# Patient Record
Sex: FEMALE | Race: WHITE | NOT HISPANIC OR LATINO | Employment: OTHER | URBAN - METROPOLITAN AREA
[De-identification: names, ages, dates, MRNs, and addresses within clinical notes are randomized per-mention and may not be internally consistent; named-entity substitution may affect disease eponyms.]

---

## 2017-03-21 ENCOUNTER — ALLSCRIPTS OFFICE VISIT (OUTPATIENT)
Dept: OTHER | Facility: OTHER | Age: 72
End: 2017-03-21

## 2017-08-21 ENCOUNTER — GENERIC CONVERSION - ENCOUNTER (OUTPATIENT)
Dept: OTHER | Facility: OTHER | Age: 72
End: 2017-08-21

## 2018-01-10 NOTE — PROGRESS NOTES
Chief Complaint  suture removal L hand, healing very well      Active Problems   1  Actinic keratosis (702 0) (L57 0)  2  Changing skin lesion (709 9) (L98 9)  3  Screening for skin condition (V82 0) (Z13 89)  4  Seborrheic keratosis (702 19) (L82 1)  5  Squamous cell cancer of skin of left hand (173 62) (C44 629)    Current Meds  1  Lisinopril 10 MG Oral Tablet; Therapy: (Recorded:67Gsh2524) to Recorded  2  MetFORMIN HCl TABS; Therapy: (Recorded:80Mql7907) to Recorded  3  VESIcare 5 MG Oral Tablet; Therapy: (Recorded:90Pmf2469) to Recorded    Allergies   1  Penicillins   2  Latex    Assessment   1  Squamous cell cancer of skin of left hand (173 62) (C44 629)    Plan  Squamous cell cancer of skin of left hand    · Follow-up as previously scheduled Evaluation and Treatment  Follow-up  Status:  Complete  Done: [id number]    Future Appointments    Date/Time Provider Specialty Site   09/14/2016 09:00 AM Photo Kaylie Sosa, Nurse Schedule  [facility]   02/02/2017 10:35 AM MATEO Chapa  Dermatology [facility]   07/24/2017 09:35 AM MATEO Chapa   Dermatology [facility]     Signatures   Electronically signed by : MATEO Russo ; Aug 14 2016  9:46AM EST                       (Author)

## 2018-05-02 ENCOUNTER — HOSPITAL ENCOUNTER (INPATIENT)
Facility: HOSPITAL | Age: 73
LOS: 4 days | Discharge: HOME | DRG: 690 | End: 2018-05-06
Attending: EMERGENCY MEDICINE | Admitting: INTERNAL MEDICINE
Payer: MEDICARE

## 2018-05-02 ENCOUNTER — APPOINTMENT (EMERGENCY)
Dept: CT IMAGING | Facility: HOSPITAL | Age: 73
DRG: 690 | End: 2018-05-02
Payer: MEDICARE

## 2018-05-02 DIAGNOSIS — E11.9 TYPE 2 DIABETES MELLITUS (HCC): ICD-10-CM

## 2018-05-02 DIAGNOSIS — N12 PYELONEPHRITIS: Primary | ICD-10-CM

## 2018-05-02 DIAGNOSIS — E86.0 DEHYDRATION: ICD-10-CM

## 2018-05-02 DIAGNOSIS — E87.1 HYPONATREMIA: ICD-10-CM

## 2018-05-02 PROBLEM — N17.9 ACUTE KIDNEY INJURY (HCC): Status: ACTIVE | Noted: 2018-05-02

## 2018-05-02 PROBLEM — N39.0 URINARY TRACT INFECTION: Status: ACTIVE | Noted: 2018-05-02

## 2018-05-02 PROBLEM — E80.6 HYPERBILIRUBINEMIA: Status: ACTIVE | Noted: 2018-05-02

## 2018-05-02 PROBLEM — E87.6 HYPOKALEMIA: Status: ACTIVE | Noted: 2018-05-02

## 2018-05-02 LAB
ALBUMIN SERPL BCP-MCNC: 2.7 G/DL (ref 3.5–5)
ALP SERPL-CCNC: 190 U/L (ref 46–116)
ALT SERPL W P-5'-P-CCNC: 57 U/L (ref 12–78)
ANION GAP SERPL CALCULATED.3IONS-SCNC: 11 MMOL/L (ref 4–13)
AST SERPL W P-5'-P-CCNC: 30 U/L (ref 5–45)
BACTERIA UR QL AUTO: ABNORMAL /HPF
BASOPHILS # BLD MANUAL: 0 THOUSAND/UL (ref 0–0.1)
BASOPHILS NFR MAR MANUAL: 0 % (ref 0–1)
BILIRUB SERPL-MCNC: 1.3 MG/DL (ref 0.2–1)
BILIRUB UR QL STRIP: ABNORMAL
BUN SERPL-MCNC: 14 MG/DL (ref 5–25)
CALCIUM SERPL-MCNC: 8.9 MG/DL (ref 8.3–10.1)
CHLORIDE SERPL-SCNC: 93 MMOL/L (ref 100–108)
CLARITY UR: ABNORMAL
CO2 SERPL-SCNC: 24 MMOL/L (ref 21–32)
COLOR UR: YELLOW
CREAT SERPL-MCNC: 1.65 MG/DL (ref 0.6–1.3)
EOSINOPHIL # BLD MANUAL: 0 THOUSAND/UL (ref 0–0.4)
EOSINOPHIL NFR BLD MANUAL: 0 % (ref 0–6)
ERYTHROCYTE [DISTWIDTH] IN BLOOD BY AUTOMATED COUNT: 13.4 % (ref 11.6–15.1)
GFR SERPL CREATININE-BSD FRML MDRD: 30 ML/MIN/1.73SQ M
GLUCOSE SERPL-MCNC: 201 MG/DL (ref 65–140)
GLUCOSE SERPL-MCNC: 316 MG/DL (ref 65–140)
GLUCOSE UR STRIP-MCNC: ABNORMAL MG/DL
HCT VFR BLD AUTO: 39.3 % (ref 34.8–46.1)
HGB BLD-MCNC: 13.3 G/DL (ref 11.5–15.4)
HGB UR QL STRIP.AUTO: ABNORMAL
KETONES UR STRIP-MCNC: ABNORMAL MG/DL
LACTATE SERPL-SCNC: 1.1 MMOL/L (ref 0.5–2)
LEUKOCYTE ESTERASE UR QL STRIP: ABNORMAL
LIPASE SERPL-CCNC: 167 U/L (ref 73–393)
LYMPHOCYTES # BLD AUTO: 0.73 THOUSAND/UL (ref 0.6–4.47)
LYMPHOCYTES # BLD AUTO: 7 % (ref 14–44)
MCH RBC QN AUTO: 30.6 PG (ref 26.8–34.3)
MCHC RBC AUTO-ENTMCNC: 33.8 G/DL (ref 31.4–37.4)
MCV RBC AUTO: 91 FL (ref 82–98)
MONOCYTES # BLD AUTO: 1.78 THOUSAND/UL (ref 0–1.22)
MONOCYTES NFR BLD: 17 % (ref 4–12)
NEUTROPHILS # BLD MANUAL: 7.96 THOUSAND/UL (ref 1.85–7.62)
NEUTS SEG NFR BLD AUTO: 76 % (ref 43–75)
NITRITE UR QL STRIP: NEGATIVE
NON-SQ EPI CELLS URNS QL MICRO: ABNORMAL /HPF
NRBC BLD AUTO-RTO: 0 /100 WBCS
PH UR STRIP.AUTO: 6 [PH] (ref 4.5–8)
PLATELET # BLD AUTO: 169 THOUSANDS/UL (ref 149–390)
PLATELET # BLD AUTO: 194 THOUSANDS/UL (ref 149–390)
PLATELET BLD QL SMEAR: ADEQUATE
PMV BLD AUTO: 10.2 FL (ref 8.9–12.7)
PMV BLD AUTO: 10.2 FL (ref 8.9–12.7)
POTASSIUM SERPL-SCNC: 3.2 MMOL/L (ref 3.5–5.3)
PROT SERPL-MCNC: 7.3 G/DL (ref 6.4–8.2)
PROT UR STRIP-MCNC: ABNORMAL MG/DL
RBC # BLD AUTO: 4.34 MILLION/UL (ref 3.81–5.12)
RBC #/AREA URNS AUTO: ABNORMAL /HPF
RBC MORPH BLD: NORMAL
SODIUM SERPL-SCNC: 128 MMOL/L (ref 136–145)
SP GR UR STRIP.AUTO: 1.02 (ref 1–1.03)
TOTAL CELLS COUNTED SPEC: 100
UROBILINOGEN UR QL STRIP.AUTO: 2 E.U./DL
WBC # BLD AUTO: 10.48 THOUSAND/UL (ref 4.31–10.16)
WBC #/AREA URNS AUTO: ABNORMAL /HPF

## 2018-05-02 PROCEDURE — 81001 URINALYSIS AUTO W/SCOPE: CPT | Performed by: EMERGENCY MEDICINE

## 2018-05-02 PROCEDURE — 80053 COMPREHEN METABOLIC PANEL: CPT | Performed by: EMERGENCY MEDICINE

## 2018-05-02 PROCEDURE — 96361 HYDRATE IV INFUSION ADD-ON: CPT

## 2018-05-02 PROCEDURE — 85049 AUTOMATED PLATELET COUNT: CPT | Performed by: INTERNAL MEDICINE

## 2018-05-02 PROCEDURE — 83690 ASSAY OF LIPASE: CPT | Performed by: EMERGENCY MEDICINE

## 2018-05-02 PROCEDURE — 83605 ASSAY OF LACTIC ACID: CPT | Performed by: EMERGENCY MEDICINE

## 2018-05-02 PROCEDURE — 87186 SC STD MICRODIL/AGAR DIL: CPT | Performed by: EMERGENCY MEDICINE

## 2018-05-02 PROCEDURE — 36415 COLL VENOUS BLD VENIPUNCTURE: CPT | Performed by: EMERGENCY MEDICINE

## 2018-05-02 PROCEDURE — 96375 TX/PRO/DX INJ NEW DRUG ADDON: CPT

## 2018-05-02 PROCEDURE — 74176 CT ABD & PELVIS W/O CONTRAST: CPT

## 2018-05-02 PROCEDURE — 87186 SC STD MICRODIL/AGAR DIL: CPT | Performed by: GENERAL PRACTICE

## 2018-05-02 PROCEDURE — 87077 CULTURE AEROBIC IDENTIFY: CPT | Performed by: GENERAL PRACTICE

## 2018-05-02 PROCEDURE — 99285 EMERGENCY DEPT VISIT HI MDM: CPT

## 2018-05-02 PROCEDURE — 85007 BL SMEAR W/DIFF WBC COUNT: CPT | Performed by: EMERGENCY MEDICINE

## 2018-05-02 PROCEDURE — 83036 HEMOGLOBIN GLYCOSYLATED A1C: CPT | Performed by: INTERNAL MEDICINE

## 2018-05-02 PROCEDURE — 87077 CULTURE AEROBIC IDENTIFY: CPT | Performed by: EMERGENCY MEDICINE

## 2018-05-02 PROCEDURE — 85027 COMPLETE CBC AUTOMATED: CPT | Performed by: EMERGENCY MEDICINE

## 2018-05-02 PROCEDURE — 82948 REAGENT STRIP/BLOOD GLUCOSE: CPT

## 2018-05-02 PROCEDURE — 87040 BLOOD CULTURE FOR BACTERIA: CPT | Performed by: EMERGENCY MEDICINE

## 2018-05-02 PROCEDURE — 96365 THER/PROPH/DIAG IV INF INIT: CPT

## 2018-05-02 PROCEDURE — 99223 1ST HOSP IP/OBS HIGH 75: CPT | Performed by: INTERNAL MEDICINE

## 2018-05-02 PROCEDURE — 87086 URINE CULTURE/COLONY COUNT: CPT | Performed by: GENERAL PRACTICE

## 2018-05-02 RX ORDER — POTASSIUM CHLORIDE 20 MEQ/1
40 TABLET, EXTENDED RELEASE ORAL ONCE
Status: COMPLETED | OUTPATIENT
Start: 2018-05-02 | End: 2018-05-02

## 2018-05-02 RX ORDER — ONDANSETRON 2 MG/ML
4 INJECTION INTRAMUSCULAR; INTRAVENOUS EVERY 6 HOURS PRN
Status: DISCONTINUED | OUTPATIENT
Start: 2018-05-02 | End: 2018-05-06 | Stop reason: HOSPADM

## 2018-05-02 RX ORDER — MORPHINE SULFATE 2 MG/ML
1 INJECTION, SOLUTION INTRAMUSCULAR; INTRAVENOUS EVERY 6 HOURS PRN
Status: DISCONTINUED | OUTPATIENT
Start: 2018-05-02 | End: 2018-05-06 | Stop reason: HOSPADM

## 2018-05-02 RX ORDER — KETOROLAC TROMETHAMINE 30 MG/ML
15 INJECTION, SOLUTION INTRAMUSCULAR; INTRAVENOUS ONCE
Status: COMPLETED | OUTPATIENT
Start: 2018-05-02 | End: 2018-05-02

## 2018-05-02 RX ORDER — ACETAMINOPHEN 325 MG/1
650 TABLET ORAL EVERY 6 HOURS PRN
Status: DISCONTINUED | OUTPATIENT
Start: 2018-05-02 | End: 2018-05-06 | Stop reason: HOSPADM

## 2018-05-02 RX ORDER — SODIUM CHLORIDE 9 MG/ML
75 INJECTION, SOLUTION INTRAVENOUS CONTINUOUS
Status: DISCONTINUED | OUTPATIENT
Start: 2018-05-02 | End: 2018-05-04

## 2018-05-02 RX ORDER — ONDANSETRON 2 MG/ML
4 INJECTION INTRAMUSCULAR; INTRAVENOUS ONCE
Status: COMPLETED | OUTPATIENT
Start: 2018-05-02 | End: 2018-05-02

## 2018-05-02 RX ORDER — HEPARIN SODIUM 5000 [USP'U]/ML
5000 INJECTION, SOLUTION INTRAVENOUS; SUBCUTANEOUS EVERY 8 HOURS SCHEDULED
Status: DISCONTINUED | OUTPATIENT
Start: 2018-05-02 | End: 2018-05-06 | Stop reason: HOSPADM

## 2018-05-02 RX ADMIN — KETOROLAC TROMETHAMINE 15 MG: 30 INJECTION, SOLUTION INTRAMUSCULAR at 14:41

## 2018-05-02 RX ADMIN — SODIUM CHLORIDE 1000 ML: 0.9 INJECTION, SOLUTION INTRAVENOUS at 17:04

## 2018-05-02 RX ADMIN — CEFEPIME HYDROCHLORIDE 2000 MG: 2 INJECTION, SOLUTION INTRAVENOUS at 17:04

## 2018-05-02 RX ADMIN — SODIUM CHLORIDE 1000 ML: 0.9 INJECTION, SOLUTION INTRAVENOUS at 14:42

## 2018-05-02 RX ADMIN — INSULIN LISPRO 1 UNITS: 100 INJECTION, SOLUTION INTRAVENOUS; SUBCUTANEOUS at 22:38

## 2018-05-02 RX ADMIN — POTASSIUM CHLORIDE 40 MEQ: 1500 TABLET, EXTENDED RELEASE ORAL at 19:29

## 2018-05-02 RX ADMIN — ONDANSETRON 4 MG: 2 INJECTION INTRAMUSCULAR; INTRAVENOUS at 14:41

## 2018-05-02 RX ADMIN — HEPARIN SODIUM 5000 UNITS: 5000 INJECTION, SOLUTION INTRAVENOUS; SUBCUTANEOUS at 21:36

## 2018-05-02 RX ADMIN — SODIUM CHLORIDE 75 ML/HR: 0.9 INJECTION, SOLUTION INTRAVENOUS at 19:29

## 2018-05-02 RX ADMIN — ACETAMINOPHEN 650 MG: 325 TABLET, FILM COATED ORAL at 22:12

## 2018-05-02 NOTE — H&P
H&P- Tina Hurtado 1945, 74 y o  female MRN: [id number]    Unit/Bed#: ED 08 Encounter: [id number]    Primary Care Provider: Dhiraj Lutz DO   Date and time admitted to hospital: 5/2/2018  1:58 PM        Urinary tract infection   Assessment & Plan    Urinalysis on admission showed positive leukocytes, bacteria  Patient also has bilateral flank pain, left side greater than right  Likely possible pyelonephritis, with complaint of hydronephrosis on left side  CT scan of the abdomen showed mild dilation left pelvicalyceal system with mildly dilated left ureter with perinephric stranding with no demonstratable obstructing calculus  Patient was empirically started on IV cefepime  Continue IV cefepime for now for possible pyelonephritis  Continue monitor CBC, temps  Monitor final urine cultures        Hyponatremia   Assessment & Plan    Patient's sodium level on admission was 128 but Corrected sodium for hyperglycemia-132 likely secondary to dehydration  She received 2 L IV normal saline bolus while in the ER    Continue IV fluid hydration with normal saline  Monitor sodium          Acute kidney injury ([address] )   Assessment & Plan    Patient's creatinine on admission is 1 65  Baseline creatinine unknown  She received 2 L normal saline bolus while in the ER  Will continue maintenance fluid hydration  Monitor creatinine  Avoid NSAID's (pt did receive toradal in ED)        Type 2 diabetes mellitus [facility])   Assessment & Plan    Patient did report that she has prior history of diabetes but has not been taking any of her medications  Blood sugars have been elevated, ranging in 300's    Check A1c  Insulin sliding scale, monitor sugars  Counseled the patient importance of tight blood sugar control  Close outpatient follow-up after discharge        Hypokalemia   Assessment & Plan    Potassium was 2 8 on admission    Likely secondary to nausea/vomiting/dehydration    Replete and monitor Hyperbilirubinemia   Assessment & Plan    Likely secondary to acute infection/dehydration    Continue to monitor              VTE Prophylaxis: Heparin  / sequential compression device   Code Status:  Full code  POLST: There is no POLST form on file for this patient (pre-hospital)  Discussion with family:  Discussed with patient in detail about her management    Anticipated Length of Stay:  Patient will be admitted on an Inpatient basis with an anticipated length of stay of  greater than 2 midnights  Justification for Hospital Stay:  Possible left-sided pyelonephritis/hydronephrosis    Total Time for Visit, including Counseling / Coordination of Care: 30 minutes  Greater than 50% of this total time spent on direct patient counseling and coordination of care  Chief Complaint:   Flank pain    History of Present Illness:    Eric Hernandez is a 74 y o  female with past medical history of diabetes, medication noncompliance presented to ED complaints of bilateral flank pain, fevers, chills, nausea, vomiting for the past 1 week  She did go to urgent care today but as she was unable to provide a urine for testing she was sent to ER for further evaluation  Urinalysis on admission was positive for leukocytes, bacteria, blood, empirically started on IV cefepime for possible UTI  CT of the abdomen showed dilation of left pelvicalyceal system, with mildly dilated left ureter with perinephric stranding with no demonstrable obstructing calculus  She was also found to have sodium of 128, potassium 3 2, creatinine 1 65 and glucose of 313 on BMP  Currently patient does admits her pain got better with the pain medication but still has some tenderness over the left flank area  Review of Systems:    Review of Systems   Constitutional: Positive for activity change, appetite change, chills, fatigue and fever  Respiratory: Positive for cough  Negative for shortness of breath      Cardiovascular: Negative for chest pain, palpitations and leg swelling  Gastrointestinal: Positive for abdominal pain, nausea and vomiting  Negative for diarrhea  Genitourinary: Positive for decreased urine volume, difficulty urinating and dysuria  Musculoskeletal: Positive for back pain  Neurological: Negative for dizziness and light-headedness  Past Medical and Surgical History:     Past Medical History:   Diagnosis Date    Diabetes mellitus ([facility] [address] )     Patient states "I quit my medication"       History reviewed  No pertinent surgical history  Meds/Allergies:    Prior to Admission medications    Not on File     I have reviewed home medications with patient personally  Allergies: Allergies   Allergen Reactions    Latex Rash    Penicillin G Rash       Social History:     Marital Status: /Civil Union   Occupation: retired  Patient Pre-hospital Living Situation: lives alone  Patient Pre-hospital Level of Mobility:  Independent  Patient Pre-hospital Diet Restrictions:   Substance Use History:   History   Alcohol Use No     History   Smoking Status    Never Smoker   Smokeless Tobacco    Never Used     History   Drug Use No       Family History:    Family History   Problem Relation Age of Onset    Diabetes Mother        Physical Exam:     Vitals:   Blood Pressure: 132/67 (05/02/18 1545)  Pulse: 74 (05/02/18 1545)  Temperature: 98 4 °F (36 9 °C) (05/02/18 1638)  Temp Source: Oral (05/02/18 1638)  Respirations: 17 (05/02/18 1402)  Weight - Scale: 66 7 kg (147 lb) (05/02/18 1402)  SpO2: 95 % (05/02/18 1545)    Physical Exam   Constitutional: She is oriented to person, place, and time  She appears well-developed and well-nourished  HENT:   Head: Normocephalic and atraumatic  Eyes: EOM are normal  Pupils are equal, round, and reactive to light  Neck: Normal range of motion  Neck supple  Cardiovascular: Normal rate and regular rhythm  Pulmonary/Chest: Effort normal and breath sounds normal  No respiratory distress  Abdominal: Soft  Bowel sounds are normal  There is tenderness  Musculoskeletal:   Mild tenderness to palpation in the left lower back   Neurological: She is alert and oriented to person, place, and time  Skin: Skin is warm and dry  Additional Data:     Lab Results: I have personally reviewed pertinent reports  Results from last 7 days  Lab Units 05/02/18  1437   WBC Thousand/uL 10 48*   HEMOGLOBIN g/dL 13 3   HEMATOCRIT % 39 3   PLATELETS Thousands/uL 194   LYMPHO PCT % 7*   MONO PCT MAN % 17*   EOSINO PCT MANUAL % 0       Results from last 7 days  Lab Units 05/02/18  1437   SODIUM mmol/L 128*   POTASSIUM mmol/L 3 2*   CHLORIDE mmol/L 93*   CO2 mmol/L 24   BUN mg/dL 14   CREATININE mg/dL 1 65*   CALCIUM mg/dL 8 9   TOTAL PROTEIN g/dL 7 3   BILIRUBIN TOTAL mg/dL 1 30*   ALK PHOS U/L 190*   ALT U/L 57   AST U/L 30   GLUCOSE RANDOM mg/dL 316*                   Imaging: I have personally reviewed pertinent reports  CT renal stone study abdomen pelvis without contrast   Final Result by Dm Ryan MD (05/02 1603)      There is mild dilation left pelvicalyceal system with mildly dilated left ureter with perinephric stranding with no demonstratable obstructing calculus  Findings may be related to urinary tract infection or due to recent passage of  ureteric calculus  Correlate clinically with urinary evaluation             I personally discussed this study with RAMY Skinner on 5/2/2018 at 4:02 PM                       Workstation performed: [id number]             EKG, Pathology, and Other Studies Reviewed on Admission:   · EKG:    Allscripts / Epic Records Reviewed: Yes     ** Please Note: This note has been constructed using a voice recognition system   ** temporal

## 2018-05-02 NOTE — ASSESSMENT & PLAN NOTE
Potassium was 2 8 on admission    Likely secondary to nausea/vomiting/dehydration    Replete and monitor

## 2018-05-02 NOTE — PROGRESS NOTES
Received pt from the ER  AAOX3  Pt placed on telemetry  Made comfortable in bed  Voiced no complaints  No acute distress noted

## 2018-05-02 NOTE — ASSESSMENT & PLAN NOTE
Patient did report that she has prior history of diabetes but has not been taking any of her medications  Blood sugars have been elevated, ranging in 300's    Check A1c  Insulin sliding scale, monitor sugars  Counseled the patient importance of tight blood sugar control    Close outpatient follow-up after discharge

## 2018-05-02 NOTE — ASSESSMENT & PLAN NOTE
Patient's creatinine on admission is 1 65  Baseline creatinine unknown  She received 2 L normal saline bolus while in the ER  Will continue maintenance fluid hydration    Monitor creatinine  Avoid NSAID's (pt did receive toradal in ED)

## 2018-05-02 NOTE — ASSESSMENT & PLAN NOTE
Urinalysis on admission showed positive leukocytes, bacteria  Patient also has bilateral flank pain, left side greater than right  Likely possible pyelonephritis, with complaint of hydronephrosis on left side  CT scan of the abdomen showed mild dilation left pelvicalyceal system with mildly dilated left ureter with perinephric stranding with no demonstratable obstructing calculus  Patient was empirically started on IV cefepime      Continue IV cefepime for now for possible pyelonephritis  Continue monitor CBC, temps  Monitor final urine cultures

## 2018-05-02 NOTE — ASSESSMENT & PLAN NOTE
Patient's sodium level on admission was 128 but Corrected sodium for hyperglycemia-132 likely secondary to dehydration  She received 2 L IV normal saline bolus while in the ER    Continue IV fluid hydration with normal saline  Monitor sodium

## 2018-05-02 NOTE — ED PROVIDER NOTES
History  Chief Complaint   Patient presents with    Flank Pain     pt c/o bilateral flank pain for the past week  states that she went to urgent care but is unable to provide urine for testing      c/o fevers, chills, nausea, vomiting, constant lower back pain radiating around to the front for 4 days  No Previous abd  Surgeries    +mild cough, no dysuria    Pt  Has been incontinent of urine over the past few days  She was at urgent care today and was sent here for evaluation  None       Past Medical History:   Diagnosis Date    Diabetes mellitus ([facility] [address] )     Patient states "I quit my medication"       History reviewed  No pertinent surgical history  Family History   Problem Relation Age of Onset    Diabetes Mother      I have reviewed and agree with the history as documented  Social History   Substance Use Topics    Smoking status: Never Smoker    Smokeless tobacco: Never Used    Alcohol use No        Review of Systems   Constitutional: Positive for fever  Negative for fatigue  HENT: Negative for rhinorrhea and sore throat  Respiratory: Positive for cough  Negative for shortness of breath and wheezing  Cardiovascular: Negative for chest pain and leg swelling  Gastrointestinal: Positive for abdominal pain, diarrhea and vomiting  Genitourinary: Negative for dysuria and flank pain  Musculoskeletal: Positive for back pain  Negative for neck pain  Skin: Negative for rash  Neurological: Negative for syncope and headaches     Psychiatric/Behavioral:        Mood normal       Physical Exam  ED Triage Vitals [05/02/18 1402]   Temperature Pulse Respirations Blood Pressure SpO2   99 °F (37 2 °C) 96 17 169/76 97 %      Temp Source Heart Rate Source Patient Position - Orthostatic VS BP Location FiO2 (%)   Oral Monitor Sitting Right arm --      Pain Score       8           Orthostatic Vital Signs  Vitals:    05/02/18 1402 05/02/18 1545 05/02/18 1844   BP: 169/76 132/67 166/79   Pulse: 96 74 75   Patient Position - Orthostatic VS: Sitting Lying Lying       Physical Exam   Constitutional: She is oriented to person, place, and time  She appears well-developed and well-nourished  HENT:   Head: Normocephalic and atraumatic  Mouth/Throat: Oropharynx is clear and moist    Neck: Normal range of motion  Neck supple  Cardiovascular: Normal rate and regular rhythm  Pulmonary/Chest: Effort normal and breath sounds normal    Abdominal: Soft  There is no tenderness  Musculoskeletal:   b/l CVA tenderness   Neurological: She is alert and oriented to person, place, and time  Skin: Skin is warm and dry  Nursing note and vitals reviewed        ED Medications  Medications   sodium chloride 0 9 % infusion (75 mL/hr Intravenous New Bag 5/2/18 1929)   ondansetron (ZOFRAN) injection 4 mg (not administered)   heparin (porcine) subcutaneous injection 5,000 Units (not administered)   acetaminophen (TYLENOL) tablet 650 mg (not administered)   cefepime (MAXIPIME) IVPB (premix) 2,000 mg (not administered)   insulin lispro (HumaLOG) 100 units/mL subcutaneous injection 1-6 Units (not administered)   insulin lispro (HumaLOG) 100 units/mL subcutaneous injection 1-5 Units (not administered)   morphine injection 1 mg (not administered)   sodium chloride 0 9 % bolus 1,000 mL (0 mL Intravenous Stopped 5/2/18 1542)   ketorolac (TORADOL) injection 15 mg (15 mg Intravenous Given 5/2/18 1441)   ondansetron (ZOFRAN) injection 4 mg (4 mg Intravenous Given 5/2/18 1441)   cefepime (MAXIPIME) IVPB (premix) 2,000 mg (0 mg Intravenous Stopped 5/2/18 1734)   sodium chloride 0 9 % bolus 1,000 mL (1,000 mL Intravenous New Bag 5/2/18 1704)   potassium chloride (K-DUR,KLOR-CON) CR tablet 40 mEq (40 mEq Oral Given 5/2/18 1929)       Diagnostic Studies  Results Reviewed     Procedure Component Value Units Date/Time    Lactic acid, plasma [[id number]]  (Normal) Collected:  05/02/18 1645    Lab Status:  Final result Specimen:  Blood from Arm, Right Updated:  05/02/18 1711     LACTIC ACID 1 1 mmol/L     Narrative:         Result may be elevated if tourniquet was used during collection  Blood culture #1 [[id number]] Collected:  05/02/18 1644    Lab Status: In process Specimen:  Blood from Arm, Right Updated:  05/02/18 1650    Blood culture #2 [[id number]] Collected:  05/02/18 1645    Lab Status:   In process Specimen:  Blood from Arm, Right Updated:  05/02/18 1650    Urine Microscopic [[id number]]  (Abnormal) Collected:  05/02/18 1616    Lab Status:  Final result Specimen:  Urine from Urine, Clean Catch Updated:  05/02/18 1633     RBC, UA 10-20 (A) /hpf      WBC, UA 20-30 (A) /hpf      Epithelial Cells Occasional /hpf      Bacteria, UA Moderate (A) /hpf     UA w Reflex to Microscopic [[id number]]  (Abnormal) Collected:  05/02/18 1616    Lab Status:  Final result Specimen:  Urine from Urine, Clean Catch Updated:  05/02/18 1622     Color, UA Yellow     Clarity, UA Slightly Cloudy     Specific [place], UA 1 020     pH, UA 6 0     Leukocytes, UA Small (A)     Nitrite, UA Negative     Protein,  (2+) (A) mg/dl      Glucose, UA >=1000 (1%) (A) mg/dl      Ketones, UA Trace (A) mg/dl      Urobilinogen, UA 2 0 (A) E U /dl      Bilirubin, UA Small (A)     Blood, UA Large (A)    CBC and differential [[id number]]  (Abnormal) Collected:  05/02/18 1437    Lab Status:  Final result Specimen:  Blood from Arm, Right Updated:  05/02/18 1531     WBC 10 48 (H) Thousand/uL      RBC 4 34 Million/uL      Hemoglobin 13 3 g/dL      Hematocrit 39 3 %      MCV 91 fL      MCH 30 6 pg      MCHC 33 8 g/dL      RDW 13 4 %      MPV 10 2 fL      Platelets 812 Thousands/uL      nRBC 0 /100 WBCs     Comprehensive metabolic panel [[id number]]  (Abnormal) Collected:  05/02/18 1437    Lab Status:  Final result Specimen:  Blood from Arm, Right Updated:  05/02/18 1507     Sodium 128 (L) mmol/L      Potassium 3 2 (L) mmol/L      Chloride 93 (L) mmol/L      CO2 24 mmol/L      Anion Gap 11 mmol/L BUN 14 mg/dL      Creatinine 1 65 (H) mg/dL      Glucose 316 (H) mg/dL      Calcium 8 9 mg/dL      AST 30 U/L      ALT 57 U/L      Alkaline Phosphatase 190 (H) U/L      Total Protein 7 3 g/dL      Albumin 2 7 (L) g/dL      Total Bilirubin 1 30 (H) mg/dL      eGFR 30 ml/min/1 73sq m     Narrative:         National Kidney Disease Education Program recommendations are as follows:  GFR calculation is accurate only with a steady state creatinine  Chronic Kidney disease less than 60 ml/min/1 73 sq  meters  Kidney failure less than 15 ml/min/1 73 sq  meters  Lipase [[id number]]  (Normal) Collected:  05/02/18 1437    Lab Status:  Final result Specimen:  Blood from Arm, Right Updated:  05/02/18 1507     Lipase 167 u/L                  CT renal stone study abdomen pelvis without contrast   Final Result by Elmo Amin MD (05/02 1603)      There is mild dilation left pelvicalyceal system with mildly dilated left ureter with perinephric stranding with no demonstratable obstructing calculus  Findings may be related to urinary tract infection or due to recent passage of  ureteric calculus  Correlate clinically with urinary evaluation             I personally discussed this study with MARISA R Arsenio Cranker on 5/2/2018 at 4:02 PM                       Workstation performed: [id number]                    Procedures  Procedures       Phone Contacts  ED Phone Contact    ED Course                               MDM  Number of Diagnoses or Management Options  Dehydration:   Hyponatremia:   Pyelonephritis:      Amount and/or Complexity of Data Reviewed  Clinical lab tests: ordered and reviewed  Tests in the radiology section of CPT®: ordered and reviewed    Risk of Complications, Morbidity, and/or Mortality  Presenting problems: moderate  General comments: Pt   Admitted for further work up      [facility] Time    Disposition  Final diagnoses:   Pyelonephritis   Hyponatremia   Dehydration     Time reflects when diagnosis was documented in both MDM as applicable and the Disposition within this note     Time User Action Codes Description Comment    5/2/2018  4:59 PM Aleidajulio cesar Dillon Add [N12] Pyelonephritis     5/2/2018  4:59 PM Adonis Hurtado Add [E87 1] Hyponatremia     5/2/2018  4:59 PM Maria Eugenia Hurtado Add [E86 0] Dehydration     5/2/2018  5:35 PM Matti Jasmine Add [E11 9] Type 2 diabetes mellitus [facility])       ED Disposition     ED Disposition Condition Comment    Admit  Case was discussed with LOUIE and the patient's admission status was agreed to be inpt /med  surg      Follow-up Information    None       There are no discharge medications for this patient  No discharge procedures on file      ED Provider  Electronically Signed by           Trey Conley MD  05/02/18 7609

## 2018-05-03 ENCOUNTER — TELEPHONE (OUTPATIENT)
Dept: OTHER | Facility: HOSPITAL | Age: 73
End: 2018-05-03

## 2018-05-03 ENCOUNTER — APPOINTMENT (INPATIENT)
Dept: ULTRASOUND IMAGING | Facility: HOSPITAL | Age: 73
DRG: 690 | End: 2018-05-03
Payer: MEDICARE

## 2018-05-03 LAB
ANION GAP SERPL CALCULATED.3IONS-SCNC: 8 MMOL/L (ref 4–13)
BUN SERPL-MCNC: 17 MG/DL (ref 5–25)
CALCIUM SERPL-MCNC: 8.1 MG/DL (ref 8.3–10.1)
CHLORIDE SERPL-SCNC: 103 MMOL/L (ref 100–108)
CO2 SERPL-SCNC: 24 MMOL/L (ref 21–32)
CREAT SERPL-MCNC: 1.49 MG/DL (ref 0.6–1.3)
ERYTHROCYTE [DISTWIDTH] IN BLOOD BY AUTOMATED COUNT: 14 % (ref 11.6–15.1)
EST. AVERAGE GLUCOSE BLD GHB EST-MCNC: 189 MG/DL
GFR SERPL CREATININE-BSD FRML MDRD: 34 ML/MIN/1.73SQ M
GLUCOSE SERPL-MCNC: 189 MG/DL (ref 65–140)
GLUCOSE SERPL-MCNC: 201 MG/DL (ref 65–140)
GLUCOSE SERPL-MCNC: 207 MG/DL (ref 65–140)
GLUCOSE SERPL-MCNC: 229 MG/DL (ref 65–140)
GLUCOSE SERPL-MCNC: 264 MG/DL (ref 65–140)
HBA1C MFR BLD: 8.2 % (ref 4.2–6.3)
HCT VFR BLD AUTO: 36.6 % (ref 34.8–46.1)
HGB BLD-MCNC: 11.9 G/DL (ref 11.5–15.4)
MCH RBC QN AUTO: 30.6 PG (ref 26.8–34.3)
MCHC RBC AUTO-ENTMCNC: 32.5 G/DL (ref 31.4–37.4)
MCV RBC AUTO: 94 FL (ref 82–98)
PLATELET # BLD AUTO: 162 THOUSANDS/UL (ref 149–390)
PMV BLD AUTO: 10.8 FL (ref 8.9–12.7)
POTASSIUM SERPL-SCNC: 4.2 MMOL/L (ref 3.5–5.3)
RBC # BLD AUTO: 3.89 MILLION/UL (ref 3.81–5.12)
SODIUM SERPL-SCNC: 135 MMOL/L (ref 136–145)
WBC # BLD AUTO: 9.75 THOUSAND/UL (ref 4.31–10.16)

## 2018-05-03 PROCEDURE — 82948 REAGENT STRIP/BLOOD GLUCOSE: CPT

## 2018-05-03 PROCEDURE — 76770 US EXAM ABDO BACK WALL COMP: CPT

## 2018-05-03 PROCEDURE — 87040 BLOOD CULTURE FOR BACTERIA: CPT | Performed by: GENERAL PRACTICE

## 2018-05-03 PROCEDURE — 99232 SBSQ HOSP IP/OBS MODERATE 35: CPT | Performed by: GENERAL PRACTICE

## 2018-05-03 PROCEDURE — 85027 COMPLETE CBC AUTOMATED: CPT | Performed by: INTERNAL MEDICINE

## 2018-05-03 PROCEDURE — 99222 1ST HOSP IP/OBS MODERATE 55: CPT | Performed by: NURSE PRACTITIONER

## 2018-05-03 PROCEDURE — 80048 BASIC METABOLIC PNL TOTAL CA: CPT | Performed by: INTERNAL MEDICINE

## 2018-05-03 RX ORDER — SIMETHICONE 80 MG
80 TABLET,CHEWABLE ORAL EVERY 6 HOURS PRN
Status: DISCONTINUED | OUTPATIENT
Start: 2018-05-03 | End: 2018-05-06 | Stop reason: HOSPADM

## 2018-05-03 RX ADMIN — HEPARIN SODIUM 5000 UNITS: 5000 INJECTION, SOLUTION INTRAVENOUS; SUBCUTANEOUS at 06:16

## 2018-05-03 RX ADMIN — INSULIN LISPRO 2 UNITS: 100 INJECTION, SOLUTION INTRAVENOUS; SUBCUTANEOUS at 12:20

## 2018-05-03 RX ADMIN — INSULIN LISPRO 1 UNITS: 100 INJECTION, SOLUTION INTRAVENOUS; SUBCUTANEOUS at 21:13

## 2018-05-03 RX ADMIN — INSULIN LISPRO 3 UNITS: 100 INJECTION, SOLUTION INTRAVENOUS; SUBCUTANEOUS at 15:57

## 2018-05-03 RX ADMIN — HEPARIN SODIUM 5000 UNITS: 5000 INJECTION, SOLUTION INTRAVENOUS; SUBCUTANEOUS at 21:12

## 2018-05-03 RX ADMIN — INSULIN LISPRO 2 UNITS: 100 INJECTION, SOLUTION INTRAVENOUS; SUBCUTANEOUS at 06:17

## 2018-05-03 RX ADMIN — CEFEPIME HYDROCHLORIDE 2000 MG: 2 INJECTION, SOLUTION INTRAVENOUS at 16:02

## 2018-05-03 RX ADMIN — SODIUM CHLORIDE 75 ML/HR: 0.9 INJECTION, SOLUTION INTRAVENOUS at 21:12

## 2018-05-03 RX ADMIN — ACETAMINOPHEN 650 MG: 325 TABLET, FILM COATED ORAL at 12:18

## 2018-05-03 RX ADMIN — CEFEPIME HYDROCHLORIDE 2000 MG: 2 INJECTION, SOLUTION INTRAVENOUS at 04:24

## 2018-05-03 RX ADMIN — SODIUM CHLORIDE 75 ML/HR: 0.9 INJECTION, SOLUTION INTRAVENOUS at 07:32

## 2018-05-03 RX ADMIN — HEPARIN SODIUM 5000 UNITS: 5000 INJECTION, SOLUTION INTRAVENOUS; SUBCUTANEOUS at 15:56

## 2018-05-03 RX ADMIN — Medication 80 MG: at 21:12

## 2018-05-03 NOTE — PLAN OF CARE
Problem: Potential for Falls  Goal: Patient will remain free of falls  INTERVENTIONS:  - Assess patient frequently for physical needs  -  Identify cognitive and physical deficits and behaviors that affect risk of falls    -  [place] fall precautions as indicated by assessment   - Educate patient/family on patient safety including physical limitations  - Instruct patient to call for assistance with activity based on assessment  - Modify environment to reduce risk of injury  - Consider OT/PT consult to assist with strengthening/mobility   Outcome: Progressing      Problem: PAIN - ADULT  Goal: Verbalizes/displays adequate comfort level or baseline comfort level  Interventions:  - Encourage patient to monitor pain and request assistance  - Assess pain using appropriate pain scale  - Administer analgesics based on type and severity of pain and evaluate response  - Implement non-pharmacological measures as appropriate and evaluate response  - Consider cultural and social influences on pain and pain management  - Notify physician/advanced practitioner if interventions unsuccessful or patient reports new pain  Outcome: Progressing      Problem: INFECTION - ADULT  Goal: Absence or prevention of progression during hospitalization  INTERVENTIONS:  - Assess and monitor for signs and symptoms of infection  - Monitor lab/diagnostic results  - Monitor all insertion sites, i e  indwelling lines, tubes, and drains  - Monitor endotracheal (as able) and nasal secretions for changes in amount and color  - [place] appropriate cooling/warming therapies per order  - Administer medications as ordered  - Instruct and encourage patient and family to use good hand hygiene technique  - Identify and instruct in appropriate isolation precautions for identified infection/condition  Outcome: Progressing    Goal: Absence of fever/infection during neutropenic period  INTERVENTIONS:  - Monitor WBC  - Implement neutropenic guidelines  Outcome: Progressing      Problem: SAFETY ADULT  Goal: Maintain or return to baseline ADL function  INTERVENTIONS:  -  Assess patient's ability to carry out ADLs; assess patient's baseline for ADL function and identify physical deficits which impact ability to perform ADLs (bathing, care of mouth/teeth, toileting, grooming, dressing, etc )  - Assess/evaluate cause of self-care deficits   - Assess range of motion  - Assess patient's mobility; develop plan if impaired  - Assess patient's need for assistive devices and provide as appropriate  - Encourage maximum independence but intervene and supervise when necessary  ¯ Involve family in performance of ADLs  ¯ Assess for home care needs following discharge   ¯ Request OT consult to assist with ADL evaluation and planning for discharge  ¯ Provide patient education as appropriate  Outcome: Progressing    Goal: Maintain or return mobility status to optimal level  INTERVENTIONS:  - Assess patient's baseline mobility status (ambulation, transfers, stairs, etc )    - Identify cognitive and physical deficits and behaviors that affect mobility  - Identify mobility aids required to assist with transfers and/or ambulation (gait belt, sit-to-stand, lift, walker, cane, etc )  - [place] fall precautions as indicated by assessment  - Record patient progress and toleration of activity level on Mobility SBAR; progress patient to next Phase/Stage  - Instruct patient to call for assistance with activity based on assessment  - Request Rehabilitation consult to assist with strengthening/weightbearing, etc   Outcome: Progressing      Problem: DISCHARGE PLANNING  Goal: Discharge to home or other facility with appropriate resources  INTERVENTIONS:  - Identify barriers to discharge w/patient and caregiver  - Arrange for needed discharge resources and transportation as appropriate  - Identify discharge learning needs (meds, wound care, etc )  - Arrange for interpretive services to assist at discharge as needed  - Refer to Case Management Department for coordinating discharge planning if the patient needs post-hospital services based on physician/advanced practitioner order or complex needs related to functional status, cognitive ability, or social support system  Outcome: Progressing      Problem: Knowledge Deficit  Goal: Patient/family/caregiver demonstrates understanding of disease process, treatment plan, medications, and discharge instructions  Complete learning assessment and assess knowledge base    Interventions:  - Provide teaching at level of understanding  - Provide teaching via preferred learning methods  Outcome: Progressing

## 2018-05-03 NOTE — CASE MANAGEMENT
Initial Clinical Review    Admission: Date/Time/Statement: 5/2/18 @ 1730     Orders Placed This Encounter   Procedures    Inpatient Admission     Standing Status:   Standing     Number of Occurrences:   1     Order Specific Question:   Admitting Physician     Answer:   Justin Ocampo [[id number]]     Order Specific Question:   Level of Care     Answer:   Med Surg [[id number]]     Order Specific Question:   Estimated length of stay     Answer:   More than 2 Midnights     Order Specific Question:   Certification     Answer:   I certify that inpatient services are medically necessary for this patient for a duration of greater than two midnights  See H&P and MD Progress Notes for additional information about the patient's course of treatment  ED: Date/Time/Mode of Arrival:   ED Arrival Information     Expected Arrival Acuity Means of Arrival Escorted By Service Admission Type    - 5/2/2018 13:55 Urgent Walk-In Family Member General Medicine Urgent    Arrival Complaint    FEVER          Chief Complaint:   Chief Complaint   Patient presents with    Flank Pain     pt c/o bilateral flank pain for the past week  states that she went to urgent care but is unable to provide urine for testing  History of Illness: John Garcia is a 74 y o  female with past medical history of diabetes, medication noncompliance presented to ED complaints of bilateral flank pain, fevers, chills, nausea, vomiting for the past 1 week  She did go to urgent care today but as she was unable to provide a urine for testing she was sent to ER for further evaluation  Urinalysis on admission was positive for leukocytes, bacteria, blood, empirically started on IV cefepime for possible UTI  CT of the abdomen showed dilation of left pelvicalyceal system, with mildly dilated left ureter with perinephric stranding with no demonstrable obstructing calculus    She was also found to have sodium of 128, potassium 3 2, creatinine 1 65 and glucose of 313 on BMP   Currently patient does admits her pain got better with the pain medication but still has some tenderness over the left flank area  ED Vital Signs:   ED Triage Vitals [05/02/18 1402]   Temperature Pulse Respirations Blood Pressure SpO2   99 °F (37 2 °C) 96 17 169/76 97 %      Temp Source Heart Rate Source Patient Position - Orthostatic VS BP Location FiO2 (%)   Oral Monitor Sitting Right arm --      Pain Score       8        Wt Readings from Last 1 Encounters:   05/02/18 70 kg (154 lb 5 2 oz)       Vital Signs (abnormal):   05/02/18 2252   103 °F (39 4 °C)  94  18  143/66  96 %  None (Room air)  Lying   05/02/18 1844  98 2 °F (36 8 °C)  75  18  166/79  98 %  None (Room air)  Lying   05/02/18 1837  --  --  --  --  --  None (Room air)  --   05/02/18 1638  98 4 °F (36 9 °C)  --  --  --  --  --  --   05/02/18 1545  --  74  --  132/67  95 %  --  Lying   05/02/18 1454  100 4 °F (38 °C)  --  --  --  --  --         Abnormal Labs/Diagnostic Test Results: wbc  10 48, Na  128, K  3 2, cl  93, BUN creat  14  1 65, gluc 316, alk phos  190, alb  2 7, total bili  1 30  CT renal stone study- There is mild dilation left pelvicalyceal system with mildly dilated left ureter with perinephric stranding with no demonstratable obstructing calculus   Findings may be related to urinary tract infection or due to recent passage of  ureteric calculus    Correlate clinically with urinary evaluation    ED Treatment:   Medication Administration from 05/02/2018 1354 to 05/02/2018 1827       Date/Time Order Dose Route Action Action by Comments     05/02/2018 1542 sodium chloride 0 9 % bolus 1,000 mL 0 mL Intravenous Stopped Roseann Costello RN      05/02/2018 1442 sodium chloride 0 9 % bolus 1,000 mL 1,000 mL Intravenous New Bag Roseann Costello RN      05/02/2018 1441 ketorolac (TORADOL) injection 15 mg 15 mg Intravenous Given Roseann Costello RN      05/02/2018 1441 ondansetron (ZOFRAN) injection 4 mg 4 mg Intravenous Given Katiuska Kilbourne KRAIG Costello      05/02/2018 1734 cefepime (MAXIPIME) IVPB (premix) 2,000 mg 0 mg Intravenous Stopped Roseann Costello RN      05/02/2018 1704 cefepime (MAXIPIME) IVPB (premix) 2,000 mg 2,000 mg Intravenous New Bag Roseann Costello RN      05/02/2018 1704 sodium chloride 0 9 % bolus 1,000 mL 1,000 mL Intravenous New Bag Roseann Costello RN           Past Medical/Surgical History: Active Ambulatory Problems     Diagnosis Date Noted    No Active Ambulatory Problems     Resolved Ambulatory Problems     Diagnosis Date Noted    No Resolved Ambulatory Problems     Past Medical History:   Diagnosis Date    Diabetes mellitus ([address] )        Admitting Diagnosis: Dehydration [E86 0]  Hyponatremia [E87 1]  Pyelonephritis [N12]  Flank pain [R10 9]  Type 2 diabetes mellitus ([address] ) [E11 9]    Age/Sex: 74 y o  female    Assessment/Plan:   Urinary tract infection   Assessment & Plan     Urinalysis on admission showed positive leukocytes, bacteria  Patient also has bilateral flank pain, left side greater than right  Likely possible pyelonephritis, with complaint of hydronephrosis on left side  CT scan of the abdomen showed mild dilation left pelvicalyceal system with mildly dilated left ureter with perinephric stranding with no demonstratable obstructing calculus  Patient was empirically started on IV cefepime      Continue IV cefepime for now for possible pyelonephritis  Continue monitor CBC, temps  Monitor final urine cultures          Hyponatremia   Assessment & Plan     Patient's sodium level on admission was 128 but Corrected sodium for hyperglycemia-132 likely secondary to dehydration  She received 2 L IV normal saline bolus while in the ER     Continue IV fluid hydration with normal saline  Monitor sodium             Acute kidney injury ([address] )   Assessment & Plan     Patient's creatinine on admission is 1 65  Baseline creatinine unknown    She received 2 L normal saline bolus while in the ER  Will continue maintenance fluid hydration  Monitor creatinine  Avoid NSAID's (pt did receive toradal in ED)          Type 2 diabetes mellitus ([address] )   Assessment & Plan     Patient did report that she has prior history of diabetes but has not been taking any of her medications  Blood sugars have been elevated, ranging in 300's     Check A1c  Insulin sliding scale, monitor sugars  Counseled the patient importance of tight blood sugar control  Close outpatient follow-up after discharge          Hypokalemia   Assessment & Plan     Potassium was 2 8 on admission  Likely secondary to nausea/vomiting/dehydration     Replete and monitor             Hyperbilirubinemia   Assessment & Plan     Likely secondary to acute infection/dehydration     Continue to monitor                   VTE Prophylaxis: Heparin  / sequential compression device   Code Status:  Full code  POLST: There is no POLST form on file for this patient (pre-hospital)  Discussion with family:  Discussed with patient in detail about her management     Anticipated Length of Stay:  Patient will be admitted on an Inpatient basis with an anticipated length of stay of  greater than 2 midnights     Justification for Hospital Stay:  Possible left-sided pyelonephritis/hydronephrosis         Admission Orders:  Scheduled Meds:   Current Facility-Administered Medications:  acetaminophen 650 mg Oral Q6H PRN Shaka Mari MD    cefepime 2,000 mg Intravenous Q12H Shaka Mari MD Last Rate: 2,000 mg (05/03/18 0424)   heparin (porcine) 5,000 Units Subcutaneous Q8H [facility] Shaka Mari MD    insulin lispro 1-5 Units Subcutaneous HS Shaka Mari MD    insulin lispro 1-6 Units Subcutaneous TID AC Shaka Mari MD    morphine injection 1 mg Intravenous Q6H PRN Shaka Mari MD    ondansetron 4 mg Intravenous Q6H PRN Shaka Mari MD    sodium chloride 75 mL/hr Intravenous Continuous Shaka Mari MD Last Rate: 75 mL/hr (05/03/18 0732)     Continuous Infusions:   sodium chloride 75 mL/hr Last Rate: 75 mL/hr (05/03/18 0732)     PRN Meds:   acetaminophen    morphine injection    ondansetron     Urology consult   Fingerstick ac and hs   Cons carb diet   Up and OOB as samson   Tele   Hepatic function panel   Bmp, cbc   Na  135, BUN creat   17 1 49, gluc  189, latoya  8 1 , gluc 201    IM note 5/3  Assessment:   Active Problems:    Type 2 diabetes mellitus ([facility] )    Urinary tract infection    Acute kidney injury ([facility] [address] )    Hyponatremia    Hypokalemia    Hyperbilirubinemia   Plan:       Urinary tract infection   Assessment & Plan     Urinalysis on admission showed positive leukocytes, bacteria   Patient also has bilateral flank pain, left side greater than right   Likely possible pyelonephritis, with complaint of hydronephrosis on left side-urology consulted  CT scan of the abdomen showed mild dilation left pelvicalyceal system with mildly dilated left ureter with perinephric stranding with no demonstratable obstructing calculus  Cefepime will narrow with UC  Continue IV cefepime for now   ORDERED urine culture   possible urolithiasis-blood in urine urology consult       Hyponatremia   Assessment & Plan     Patient's sodium level on admission was 128 but Corrected sodium for hyperglycemia-132 likely secondary to dehydration  She received 2 L IV normal saline bolus while in the ER     Continue IV fluid hydration with normal saline                Acute kidney injury ([facility] [address] )   Assessment & Plan     Cr improving with IVF  She received 2 L normal saline bolus while in the ER  Will continue maintenance fluid hydration  Monitor creatinine  Avoid NSAID's (pt did receive toradal in ED)          Type 2 diabetes mellitus ([facility] )   Assessment & Plan     a1c is 8 2; not on home meds and not following with PCP  Insulin sliding scale, monitor sugars  Counseled the patient importance of tight blood sugar control    Close outpatient follow-up after discharge        Hypokalemia   Assessment & Plan     replaced          Hyperbilirubinemia   Assessment & Plan     Likely secondary to acute infection/dehydration     Continue to monitor             VTE Pharmacologic Prophylaxis:   Pharmacologic: Heparin  Mechanical VTE Prophylaxis in Place: Yes   Patient Centered Rounds: I have performed bedside rounds with nursing staff today    Discussions with Specialists or Other Care Team Provider:    Education and Discussions with Family / Patient:    Time Spent for Care: 30 minutes    More than 50% of total time spent on counseling and coordination of care as described above    Current Length of Stay: 1 day(s)   Current Patient Status: Inpatient   Certification Statement: The patient will continue to require additional inpatient hospital stay due to uti  [address]: home

## 2018-05-03 NOTE — PLAN OF CARE

## 2018-05-03 NOTE — PROGRESS NOTES
[facility] Medicine Progress Note  Patient: Patrick Montes De Oca 74 y o  female   MRN: [id number]  PCP: Lexis Schwartz DO  Unit/Bed#: - Encounter: [id number]  Date Of Visit: 05/03/18    Assessment:    Active Problems:    Type 2 diabetes mellitus ([facility] )    Urinary tract infection    Acute kidney injury ([facility] )    Hyponatremia    Hypokalemia    Hyperbilirubinemia      Plan:      Urinary tract infection   Assessment & Plan     Urinalysis on admission showed positive leukocytes, bacteria  Patient also has bilateral flank pain, left side greater than right  Likely possible pyelonephritis, with complaint of hydronephrosis on left side-urology consulted  CT scan of the abdomen showed mild dilation left pelvicalyceal system with mildly dilated left ureter with perinephric stranding with no demonstratable obstructing calculus  Cefepime will narrow with UC  Continue IV cefepime for now   ORDERED urine culture   possible urolithiasis-blood in urine urology consult       Hyponatremia   Assessment & Plan     Patient's sodium level on admission was 128 but Corrected sodium for hyperglycemia-132 likely secondary to dehydration  She received 2 L IV normal saline bolus while in the ER     Continue IV fluid hydration with normal saline               Acute kidney injury ([facility] )   Assessment & Plan     Cr improving with IVF  She received 2 L normal saline bolus while in the ER  Will continue maintenance fluid hydration  Monitor creatinine  Avoid NSAID's (pt did receive toradal in ED)          Type 2 diabetes mellitus ([facility] )   Assessment & Plan     a1c is 8 2; not on home meds and not following with PCP  Insulin sliding scale, monitor sugars  Counseled the patient importance of tight blood sugar control    Close outpatient follow-up after discharge          Hypokalemia   Assessment & Plan     replaced          Hyperbilirubinemia   Assessment & Plan     Likely secondary to acute infection/dehydration     Continue to monitor                 VTE Pharmacologic Prophylaxis:   Pharmacologic: Heparin  Mechanical VTE Prophylaxis in Place: Yes    Patient Centered Rounds: I have performed bedside rounds with nursing staff today  Discussions with Specialists or Other Care Team Provider:     Education and Discussions with Family / Patient:     Time Spent for Care: 30 minutes  More than 50% of total time spent on counseling and coordination of care as described above  Current Length of Stay: 1 day(s)    Current Patient Status: Inpatient   Certification Statement: The patient will continue to require additional inpatient hospital stay due to uti    Discharge Plan: home    Code Status: Level 1 - Full Code      Subjective:   BAck and abdominal pain have resolved    Objective:     Vitals:   Temp (24hrs), Av 7 °F (37 6 °C), Min:98 2 °F (36 8 °C), Max:103 °F (39 4 °C)    HR:  [68-96] 81  Resp:  [17-18] 18  BP: (123-169)/(63-79) 141/76  SpO2:  [93 %-98 %] 96 %  Body mass index is 29 16 kg/m²  Input and Output Summary (last 24 hours): Intake/Output Summary (Last 24 hours) at 18 0856  Last data filed at 18 1622   Gross per 24 hour   Intake                0 ml   Output              100 ml   Net             -100 ml       Physical Exam:     Physical Exam   Constitutional: She is oriented to person, place, and time  She appears well-developed and well-nourished  HENT:   Head: Normocephalic and atraumatic  Eyes: Pupils are equal, round, and reactive to light  Cardiovascular: Normal rate and regular rhythm  Pulmonary/Chest: Effort normal and breath sounds normal    Abdominal: Soft  Musculoskeletal: She exhibits no edema or tenderness  Neurological: She is alert and oriented to person, place, and time         Additional Data:     Labs:      Results from last 7 days  Lab Units 18  0433  18  1437   WBC Thousand/uL 9 75  --  10 48*   HEMOGLOBIN g/dL 11 9  --  13 3   HEMATOCRIT % 36 6  --  39 3 PLATELETS Thousands/uL 162  < > 194   LYMPHO PCT %  --   --  7*   MONO PCT MAN %  --   --  17*   EOSINO PCT MANUAL %  --   --  0   < > = values in this interval not displayed  Results from last 7 days  Lab Units 05/03/18  0433 05/02/18  1437   SODIUM mmol/L 135* 128*   POTASSIUM mmol/L 4 2 3 2*   CHLORIDE mmol/L 103 93*   CO2 mmol/L 24 24   BUN mg/dL 17 14   CREATININE mg/dL 1 49* 1 65*   CALCIUM mg/dL 8 1* 8 9   TOTAL PROTEIN g/dL  --  7 3   BILIRUBIN TOTAL mg/dL  --  1 30*   ALK PHOS U/L  --  190*   ALT U/L  --  57   AST U/L  --  30   GLUCOSE RANDOM mg/dL 189* 316*           * I Have Reviewed All Lab Data Listed Above  * Additional Pertinent Lab Tests Reviewed: All Labs Within Last 24 Hours Reviewed    Imaging:    Imaging Reports Reviewed Today Include:   Imaging Personally Reviewed by Myself Includes:      Recent Cultures (last 7 days):           Last 24 Hours Medication List:     Current Facility-Administered Medications:  acetaminophen 650 mg Oral Q6H PRN Shaka Mari MD    cefepime 2,000 mg Intravenous Q12H Shaka Mari MD Last Rate: 2,000 mg (05/03/18 0424)   heparin (porcine) 5,000 Units Subcutaneous Q8H [address] Shaka Mari MD    insulin lispro 1-5 Units Subcutaneous HS Shaka Mari MD    insulin lispro 1-6 Units Subcutaneous TID AC Shaka Mari MD    morphine injection 1 mg Intravenous Q6H PRN Shaka Mari MD    ondansetron 4 mg Intravenous Q6H PRN Shaka Mari MD    sodium chloride 75 mL/hr Intravenous Continuous Shaka Mari MD Last Rate: 75 mL/hr (05/03/18 0732)        Today, Patient Was Seen By: Chacha Baum MD    ** Please Note: Dragon 360 Dictation voice to text software may have been used in the creation of this document   **

## 2018-05-03 NOTE — CONSULTS
CONSULT    Patient Name: Umberto Jimenez  Patient MRN: [id number]  Date of Service: 5/3/2018   Date / Time Note Created: 5/3/2018 4:24 PM   Referring Provider: Dr Vidhya Troncoso  Provider Creating Note: MEMO Arreola    PCP: Milton Arreguin  Attending Provider:  Murphy Jensen MD    Reason for Consult:  Pyelonephritis    History of present illness:  Ms Linwood Dawn is a 66-year-old female admitted for bilateral flank pain and high fever  Patient denies any prior  history of lower urinary tract symptoms, voiding dysfunction or  surgical manipulation  T-max was 103° accompanied by mild leukocytosis  Creatinine has trended down from 1 65 to 1 49 with IV fluids  Patient is receiving IV cefepime empirically  CT scan of the abdomen and pelvis were positive for mild left hydronephrosis without obstructing ureteral calculus seen and significant perinephric stranding consistent with pyelonephritis  Urologic consultation was requested against this  background  Active Problems:    Patient Active Problem List   Diagnosis    Type 2 diabetes mellitus ([facility] )    Urinary tract infection    Acute kidney injury ([facility] [facility] )    Hyponatremia    Hypokalemia    Hyperbilirubinemia          Impressions  Pyelonephritis--exacerbated in patient with poorly controlled diabetes  Patient's last A1c was greater than 9  Mild left hydronephrosis--without evidence of obstructing ureteral calculus  Patient may have recently passed stone  However, no other evidence of intra renal nephrolithiasis on CT scan  Gram-negative bacteremia--preliminary blood cultures are positive  Recommendations  Continue medical stabilization/symptom management and optimization  Emphasize glycemic control  Continue empiric antimicrobials, narrow per sensitivities and/or obtain infectious disease consultation for input   surgical intervention is not indicated      Patient can follow up with ultrasound and re-evaluation of upper tracts  Will have office staff contact patient with new hospital follow-up appointment date and time  Signing off  Do not hesitate to contact our office with any questions or new  concerns  Past Medical History:   Diagnosis Date    Diabetes mellitus ([facility] )     Patient states "I quit my medication"       History reviewed  No pertinent surgical history  Family History   Problem Relation Age of Onset    Diabetes Mother        Social History     Social History    Marital status: /Civil Union     Spouse name: N/A    Number of children: N/A    Years of education: N/A     Occupational History    Not on file       Social History Main Topics    Smoking status: Never Smoker    Smokeless tobacco: Never Used    Alcohol use No    Drug use: No    Sexual activity: Not on file     Other Topics Concern    Not on file     Social History Narrative    No narrative on file       Allergies   Allergen Reactions    Latex Rash    Penicillin G Rash       Review of Systems  10 point review of systems negative except as noted in HPI  Constitutional:   positive for  - chills and fever  Hematological and Lymphatic:   negative  Cardiovascular:   no chest pain or dyspnea on exertion  Gastrointestinal:   no abdominal pain, change in bowel habits, or black or bloody stools  Genito-Urinary:   positive for - incontinence and urinary frequency/urgency  Neurological:   no TIA or stroke symptoms     Chart Review   Allergies, current medications, history, problem list    Vital Signs  /62 (BP Location: Left arm)   Pulse 73   Temp 98 1 °F (36 7 °C) (Oral)   Resp 18   Ht 5' 1" (1 549 m)   Wt 70 kg (154 lb 5 2 oz)   SpO2 96%   BMI 29 16 kg/m²     Physical Exam  General appearance: alert and oriented, in no acute distress, appears stated age and cooperative  Head: Normocephalic, without obvious abnormality, atraumatic  Neck: no adenopathy, no carotid bruit, no JVD, supple, symmetrical, trachea midline and thyroid not enlarged, symmetric, no tenderness/mass/nodules  Lungs: clear to auscultation bilaterally  Heart: regular rate and rhythm, S1, S2 normal, no murmur, click, rub or gallop  Abdomen: soft, non-tender; bowel sounds normal; no masses,  no organomegaly  Extremities: extremities normal, warm and well-perfused; no cyanosis, clubbing, or edema  Pulses: 2+ and symmetric  Neurologic: Grossly normal  No urinary drains     Laboratory Studies  Lab Results   Component Value Date    HGBA1C 8 2 (H) 05/02/2018     (L) 05/03/2018    K 4 2 05/03/2018     05/03/2018    CO2 24 05/03/2018    GLUCOSE 189 (H) 05/03/2018    CREATININE 1 49 (H) 05/03/2018    BUN 17 05/03/2018     Lab Results   Component Value Date    WBC 9 75 05/03/2018    RBC 3 89 05/03/2018    HGB 11 9 05/03/2018    HCT 36 6 05/03/2018    MCV 94 05/03/2018    MCH 30 6 05/03/2018    RDW 14 0 05/03/2018     05/03/2018       Imaging and Other Studies  )  Ct Renal Stone Study Abdomen Pelvis Without Contrast    Result Date: 5/2/2018  Narrative: CT ABDOMEN AND PELVIS WITHOUT IV CONTRAST - LOW DOSE RENAL STONE INDICATION:   b/l flank pain, abd  pain , fevers, r/o kidney stone  COMPARISON:  None  TECHNIQUE:  Low dose thin section CT examination of the abdomen and pelvis was performed without intravenous or oral contrast according to a protocol specifically designed to evaluate for urinary tract calculus  Axial, sagittal, and coronal 2D reformatted images were created from the source data and submitted for interpretation  Evaluation for pathology in the abdomen and pelvis that is unrelated to urinary tract calculi is limited  Radiation dose length product (DLP) for this visit:  183 mGy-cm   This examination, like all CT scans performed in the [facility], was performed utilizing techniques to minimize radiation dose exposure, including the use of iterative reconstruction and automated exposure control   FINDINGS: RIGHT KIDNEY AND URETER: No urinary tract calculi  No hydronephrosis or hydroureter  Minimal perinephric stranding seen LEFT KIDNEY AND URETER: There is mild dilation of the left renal pelvis and left ureter however there is no obstructing calculus seen  There is mild perinephric stranding URINARY BLADDER: Unremarkable  No significant abnormality in the visualized lung bases  Limited low radiation dose noncontrast CT evaluation demonstrates no clinically significant abnormality of liver, spleen, pancreas, or adrenal glands  No calcified gallstones or gallbladder wall thickening noted  No ascites or bulky lymphadenopathy on this limited noncontrast study  Bowel loops appear unremarkable  Limited evaluation demonstrates no evidence to suggest acute appendicitis  No acute fracture or destructive osseous lesion is identified  Impression: There is mild dilation left pelvicalyceal system with mildly dilated left ureter with perinephric stranding with no demonstratable obstructing calculus  Findings may be related to urinary tract infection or due to recent passage of  ureteric calculus   Correlate clinically with urinary evaluation  I personally discussed this study with RAMY Patterson on 5/2/2018 at 4:02 PM   Workstation performed: [id number]       Medications   Scheduled Meds:  Current Facility-Administered Medications:  acetaminophen 650 mg Oral Q6H PRN Matti Jasmine MD    cefepime 2,000 mg Intravenous Q12H Eliot Torres MD Last Rate: 2,000 mg (05/03/18 1602)   heparin (porcine) 5,000 Units Subcutaneous Q8H [address] Eliot Torres MD    insulin lispro 1-5 Units Subcutaneous HS Eliot Torres MD    insulin lispro 1-6 Units Subcutaneous TID AC Eliot Torres MD    morphine injection 1 mg Intravenous Q6H PRN Eliot Torres MD    ondansetron 4 mg Intravenous Q6H PRN Eliot Torres MD    sodium chloride 75 mL/hr Intravenous Continuous Eliot Torres MD Last Rate: 75 mL/hr (05/03/18 0732)     Continuous Infusions:  sodium chloride 75 mL/hr Last Rate: 75 mL/hr (05/03/18 0732)     PRN Meds:   acetaminophen    morphine injection    ondansetron      Total time spent with patient 25 minutes, >50% spent counseling and/or coordination of care           MEMO Arreola

## 2018-05-03 NOTE — SOCIAL WORK
Cm met with patient at bedside, patient alert and oriented during interview  Patient reports residing alone in a senior apartment on the third floor  Patient stated her , passed aware a few months ago  Patient reports using the stairs vs  Elevator to her apartment  Patient reports being completely independent with ADL's, no dme use, vna, str, MH or SA  Patient stated she watches her grandchildren most of the day and would like cm to assist with securing a PCP in [facility]  Patient stated her daughter transports her in the community when she is not working  Cm offered patient a referral to the [facility], patient refused stated she did not want to work with them  Patient reports she will fill her prescriptions at [facility] and has monthly income for prescriptions  Patient stated daughter can make medical decisions on her behalf in the event she is unable to do so  Cm to secure PCP for patient, close to her home and to provide resources for PACE program     CM name and role reviewed and Discharge Checklist provided  Encouraged patient and caregiver to review prior to discharge  CM reviewed discharge planning process including the following: identifying help at home, patient preference for discharge planning needs, pharmacy preference, and availability of treatment team to discuss questions or concerns patient and/or family may have regarding understanding medications and recognizing signs and symptoms once discharged  CM also encouraged patient to follow up with all recommended appointments after discharge  Patient advised of importance for patient and family to participate in managing patients medical well being

## 2018-05-04 ENCOUNTER — TRANSITIONAL CARE MANAGEMENT (OUTPATIENT)
Dept: FAMILY MEDICINE CLINIC | Facility: CLINIC | Age: 73
End: 2018-05-04

## 2018-05-04 LAB
ALBUMIN SERPL BCP-MCNC: 1.9 G/DL (ref 3.5–5)
ALP SERPL-CCNC: 156 U/L (ref 46–116)
ALT SERPL W P-5'-P-CCNC: 44 U/L (ref 12–78)
ANION GAP SERPL CALCULATED.3IONS-SCNC: 8 MMOL/L (ref 4–13)
AST SERPL W P-5'-P-CCNC: 29 U/L (ref 5–45)
BASOPHILS # BLD MANUAL: 0 THOUSAND/UL (ref 0–0.1)
BASOPHILS NFR MAR MANUAL: 0 % (ref 0–1)
BILIRUB DIRECT SERPL-MCNC: 0.52 MG/DL (ref 0–0.2)
BILIRUB SERPL-MCNC: 0.9 MG/DL (ref 0.2–1)
BUN SERPL-MCNC: 15 MG/DL (ref 5–25)
CALCIUM SERPL-MCNC: 8.2 MG/DL (ref 8.3–10.1)
CHLORIDE SERPL-SCNC: 105 MMOL/L (ref 100–108)
CO2 SERPL-SCNC: 22 MMOL/L (ref 21–32)
CREAT SERPL-MCNC: 1.24 MG/DL (ref 0.6–1.3)
EOSINOPHIL # BLD MANUAL: 0 THOUSAND/UL (ref 0–0.4)
EOSINOPHIL NFR BLD MANUAL: 0 % (ref 0–6)
ERYTHROCYTE [DISTWIDTH] IN BLOOD BY AUTOMATED COUNT: 14.1 % (ref 11.6–15.1)
GFR SERPL CREATININE-BSD FRML MDRD: 43 ML/MIN/1.73SQ M
GLUCOSE SERPL-MCNC: 162 MG/DL (ref 65–140)
GLUCOSE SERPL-MCNC: 172 MG/DL (ref 65–140)
GLUCOSE SERPL-MCNC: 179 MG/DL (ref 65–140)
GLUCOSE SERPL-MCNC: 198 MG/DL (ref 65–140)
GLUCOSE SERPL-MCNC: 254 MG/DL (ref 65–140)
HCT VFR BLD AUTO: 33.1 % (ref 34.8–46.1)
HGB BLD-MCNC: 11 G/DL (ref 11.5–15.4)
LYMPHOCYTES # BLD AUTO: 1.05 THOUSAND/UL (ref 0.6–4.47)
LYMPHOCYTES # BLD AUTO: 13 % (ref 14–44)
MCH RBC QN AUTO: 30.7 PG (ref 26.8–34.3)
MCHC RBC AUTO-ENTMCNC: 33.2 G/DL (ref 31.4–37.4)
MCV RBC AUTO: 93 FL (ref 82–98)
MONOCYTES # BLD AUTO: 0.41 THOUSAND/UL (ref 0–1.22)
MONOCYTES NFR BLD: 5 % (ref 4–12)
NEUTROPHILS # BLD MANUAL: 6.65 THOUSAND/UL (ref 1.85–7.62)
NEUTS SEG NFR BLD AUTO: 82 % (ref 43–75)
NRBC BLD AUTO-RTO: 0 /100 WBCS
PLATELET # BLD AUTO: 159 THOUSANDS/UL (ref 149–390)
PLATELET BLD QL SMEAR: ADEQUATE
PMV BLD AUTO: 10.7 FL (ref 8.9–12.7)
POTASSIUM SERPL-SCNC: 3.7 MMOL/L (ref 3.5–5.3)
PROT SERPL-MCNC: 5.8 G/DL (ref 6.4–8.2)
RBC # BLD AUTO: 3.58 MILLION/UL (ref 3.81–5.12)
SODIUM SERPL-SCNC: 135 MMOL/L (ref 136–145)
TOTAL CELLS COUNTED SPEC: 100
WBC # BLD AUTO: 8.11 THOUSAND/UL (ref 4.31–10.16)

## 2018-05-04 PROCEDURE — 85007 BL SMEAR W/DIFF WBC COUNT: CPT | Performed by: GENERAL PRACTICE

## 2018-05-04 PROCEDURE — 80048 BASIC METABOLIC PNL TOTAL CA: CPT | Performed by: GENERAL PRACTICE

## 2018-05-04 PROCEDURE — 99233 SBSQ HOSP IP/OBS HIGH 50: CPT | Performed by: GENERAL PRACTICE

## 2018-05-04 PROCEDURE — 85027 COMPLETE CBC AUTOMATED: CPT | Performed by: GENERAL PRACTICE

## 2018-05-04 PROCEDURE — 80076 HEPATIC FUNCTION PANEL: CPT | Performed by: GENERAL PRACTICE

## 2018-05-04 PROCEDURE — 82948 REAGENT STRIP/BLOOD GLUCOSE: CPT

## 2018-05-04 RX ADMIN — INSULIN LISPRO 2 UNITS: 100 INJECTION, SOLUTION INTRAVENOUS; SUBCUTANEOUS at 09:39

## 2018-05-04 RX ADMIN — HEPARIN SODIUM 5000 UNITS: 5000 INJECTION, SOLUTION INTRAVENOUS; SUBCUTANEOUS at 15:11

## 2018-05-04 RX ADMIN — CEFEPIME HYDROCHLORIDE 2000 MG: 2 INJECTION, SOLUTION INTRAVENOUS at 17:14

## 2018-05-04 RX ADMIN — HEPARIN SODIUM 5000 UNITS: 5000 INJECTION, SOLUTION INTRAVENOUS; SUBCUTANEOUS at 05:11

## 2018-05-04 RX ADMIN — INSULIN LISPRO 1 UNITS: 100 INJECTION, SOLUTION INTRAVENOUS; SUBCUTANEOUS at 21:39

## 2018-05-04 RX ADMIN — INSULIN LISPRO 3 UNITS: 100 INJECTION, SOLUTION INTRAVENOUS; SUBCUTANEOUS at 17:13

## 2018-05-04 RX ADMIN — CEFEPIME HYDROCHLORIDE 2000 MG: 2 INJECTION, SOLUTION INTRAVENOUS at 05:11

## 2018-05-04 RX ADMIN — HEPARIN SODIUM 5000 UNITS: 5000 INJECTION, SOLUTION INTRAVENOUS; SUBCUTANEOUS at 21:38

## 2018-05-04 RX ADMIN — INSULIN LISPRO 1 UNITS: 100 INJECTION, SOLUTION INTRAVENOUS; SUBCUTANEOUS at 12:52

## 2018-05-04 NOTE — PROGRESS NOTES
[facility] Progress Note  Patient: Watson Levy 74 y o  female   MRN: [id number]  PCP: Kim Chadwick DO  Unit/Bed#: -01 Encounter: [id number]  Date Of Visit: 18    Assessment:    Principal Problem:    Urinary tract infection  Active Problems:    Type 2 diabetes mellitus ([facility] [address] )    Acute kidney injury ([facility] [address] )    Hyponatremia    Hypokalemia    Hyperbilirubinemia      Plan:    Urinary tract infection and bacteremia-non lactose fermenting GNR   Assessment & Plan     Urinalysis on admission showed positive leukocytes, bacteria  Final uc and bc pending  fup BC pending  On cefepime  Urology consult appreciated; renal us without hydronephrosis       Hyponatremia   Assessment & Plan     Resolved with IVF             Acute kidney injury ([facility] [address] )   Assessment & Plan     Resolved with IVF   Hypokalemia   Assessment & Plan     replaced          Hyperbilirubinemia   Assessment & Plan     Likely secondary to acute infection/dehydration     Continue to monitor                 Type 2 diabetes mellitus ([facility] [address] )   Assessment & Plan     Will need outpatient followup not on outpatient meds-a1c 8 2              VTE Pharmacologic Prophylaxis:   Pharmacologic: Heparin  Mechanical VTE Prophylaxis in Place: Yes    Patient Centered Rounds: I have performed bedside rounds with nursing staff today  Discussions with Specialists or Other Care Team Provider:     Education and Discussions with Family / Patient:     Time Spent for Care: 30 minutes  More than 50% of total time spent on counseling and coordination of care as described above  Current Length of Stay: 2 day(s)    Current Patient Status: Inpatient   Certification Statement: The patient will continue to require additional inpatient hospital stay due to bacteremia    Discharge Plan: home    Code Status: Level 1 - Full Code      Subjective:   Feels better-wants to go home  Denies abdominal or back pain      Objective:     Vitals:   Temp (24hrs), Av 9 °F (37 7 °C), Min:98 1 °F (36 7 °C), Max:102 5 °F (39 2 °C)    HR:  [61-83] 61  Resp:  [18] 18  BP: (125-161)/(62-87) 161/87  SpO2:  [94 %-97 %] 97 %  Body mass index is 29 16 kg/m²  Input and Output Summary (last 24 hours): Intake/Output Summary (Last 24 hours) at 05/04/18 0855  Last data filed at 05/03/18 1900   Gross per 24 hour   Intake          1116 25 ml   Output              900 ml   Net           216 25 ml       Physical Exam:     Physical Exam   Constitutional: She is oriented to person, place, and time  She appears well-developed and well-nourished  HENT:   Head: Normocephalic and atraumatic  Eyes: Pupils are equal, round, and reactive to light  Cardiovascular: Normal rate and regular rhythm  Pulmonary/Chest: Effort normal and breath sounds normal    Abdominal: Soft  Bowel sounds are normal    Musculoskeletal: She exhibits no edema  Neurological: She is alert and oriented to person, place, and time  Additional Data:     Labs:      Results from last 7 days  Lab Units 05/04/18  0529   WBC Thousand/uL 8 11   HEMOGLOBIN g/dL 11 0*   HEMATOCRIT % 33 1*   PLATELETS Thousands/uL 159   LYMPHO PCT % 13*   MONO PCT MAN % 5   EOSINO PCT MANUAL % 0       Results from last 7 days  Lab Units 05/04/18  0529   SODIUM mmol/L 135*   POTASSIUM mmol/L 3 7   CHLORIDE mmol/L 105   CO2 mmol/L 22   BUN mg/dL 15   CREATININE mg/dL 1 24   CALCIUM mg/dL 8 2*   TOTAL PROTEIN g/dL 5 8*   BILIRUBIN TOTAL mg/dL 0 90   ALK PHOS U/L 156*   ALT U/L 44   AST U/L 29   GLUCOSE RANDOM mg/dL 179*           * I Have Reviewed All Lab Data Listed Above  * Additional Pertinent Lab Tests Reviewed:  All Labs Within Last 24 Hours Reviewed    Imaging:    Imaging Reports Reviewed Today Include:   Imaging Personally Reviewed by Myself Includes:      Recent Cultures (last 7 days):       Results from last 7 days  Lab Units 05/02/18  1645 05/02/18  1644 05/02/18  0912   BLOOD CULTURE  Non lactose fermenting gram negative sophie* Non lactose fermenting gram negative sophie*  --    GRAM STAIN RESULT  Gram negative rods Gram negative rods  --    URINE CULTURE   --   --  >100,000 cfu/ml Non lactose fermenting gram negative sophie*       Last 24 Hours Medication List:     Current Facility-Administered Medications:  acetaminophen 650 mg Oral Q6H PRN Matti Jasmine MD    cefepime 2,000 mg Intravenous Q12H Cara Kramer MD Last Rate: 2,000 mg (05/04/18 0511)   heparin (porcine) 5,000 Units Subcutaneous Q8H [address] Cara Kramer MD    insulin lispro 1-5 Units Subcutaneous HS Cara Kramer MD    insulin lispro 1-6 Units Subcutaneous TID AC Matti Jasmine MD    morphine injection 1 mg Intravenous Q6H PRN Matti Jasmine MD    ondansetron 4 mg Intravenous Q6H PRN Cara Kramer MD    simethicone 80 mg Oral Q6H PRN Sylvie Juarez PA-C    sodium chloride 75 mL/hr Intravenous Continuous Cara Kramer MD Last Rate: 75 mL/hr (05/03/18 2112)        Today, Patient Was Seen By: Erasmo Issa MD    ** Please Note: Dragon 360 Dictation voice to text software may have been used in the creation of this document   **

## 2018-05-04 NOTE — SOCIAL WORK
Patient has a new patient appointment with [facility] on 05/09/18 AVS updated  Cm faxed PACE application, patient is working her the MA application as well

## 2018-05-05 PROBLEM — E87.1 HYPONATREMIA: Status: RESOLVED | Noted: 2018-05-02 | Resolved: 2018-05-05

## 2018-05-05 PROBLEM — E80.6 HYPERBILIRUBINEMIA: Status: RESOLVED | Noted: 2018-05-02 | Resolved: 2018-05-05

## 2018-05-05 PROBLEM — N17.9 ACUTE KIDNEY INJURY (HCC): Status: RESOLVED | Noted: 2018-05-02 | Resolved: 2018-05-05

## 2018-05-05 PROBLEM — E87.6 HYPOKALEMIA: Status: RESOLVED | Noted: 2018-05-02 | Resolved: 2018-05-05

## 2018-05-05 LAB
ALBUMIN SERPL BCP-MCNC: 2 G/DL (ref 3.5–5)
ALP SERPL-CCNC: 171 U/L (ref 46–116)
ALT SERPL W P-5'-P-CCNC: 50 U/L (ref 12–78)
ANION GAP SERPL CALCULATED.3IONS-SCNC: 8 MMOL/L (ref 4–13)
AST SERPL W P-5'-P-CCNC: 38 U/L (ref 5–45)
BACTERIA UR CULT: ABNORMAL
BACTERIA UR CULT: ABNORMAL
BASOPHILS # BLD AUTO: 0.03 THOUSANDS/ΜL (ref 0–0.1)
BASOPHILS NFR BLD AUTO: 0 % (ref 0–1)
BILIRUB SERPL-MCNC: 0.8 MG/DL (ref 0.2–1)
BUN SERPL-MCNC: 17 MG/DL (ref 5–25)
CALCIUM SERPL-MCNC: 8.5 MG/DL (ref 8.3–10.1)
CHLORIDE SERPL-SCNC: 104 MMOL/L (ref 100–108)
CO2 SERPL-SCNC: 24 MMOL/L (ref 21–32)
CREAT SERPL-MCNC: 1.23 MG/DL (ref 0.6–1.3)
EOSINOPHIL # BLD AUTO: 0.04 THOUSAND/ΜL (ref 0–0.61)
EOSINOPHIL NFR BLD AUTO: 1 % (ref 0–6)
ERYTHROCYTE [DISTWIDTH] IN BLOOD BY AUTOMATED COUNT: 14.2 % (ref 11.6–15.1)
GFR SERPL CREATININE-BSD FRML MDRD: 43 ML/MIN/1.73SQ M
GLUCOSE SERPL-MCNC: 134 MG/DL (ref 65–140)
GLUCOSE SERPL-MCNC: 138 MG/DL (ref 65–140)
GLUCOSE SERPL-MCNC: 142 MG/DL (ref 65–140)
GLUCOSE SERPL-MCNC: 190 MG/DL (ref 65–140)
GLUCOSE SERPL-MCNC: 244 MG/DL (ref 65–140)
HCT VFR BLD AUTO: 34.3 % (ref 34.8–46.1)
HGB BLD-MCNC: 11.3 G/DL (ref 11.5–15.4)
LYMPHOCYTES # BLD AUTO: 1.5 THOUSANDS/ΜL (ref 0.6–4.47)
LYMPHOCYTES NFR BLD AUTO: 20 % (ref 14–44)
MCH RBC QN AUTO: 30.4 PG (ref 26.8–34.3)
MCHC RBC AUTO-ENTMCNC: 32.9 G/DL (ref 31.4–37.4)
MCV RBC AUTO: 92 FL (ref 82–98)
MONOCYTES # BLD AUTO: 0.83 THOUSAND/ΜL (ref 0.17–1.22)
MONOCYTES NFR BLD AUTO: 11 % (ref 4–12)
NEUTROPHILS # BLD AUTO: 5.05 THOUSANDS/ΜL (ref 1.85–7.62)
NEUTS SEG NFR BLD AUTO: 67 % (ref 43–75)
NRBC BLD AUTO-RTO: 0 /100 WBCS
PLATELET # BLD AUTO: 192 THOUSANDS/UL (ref 149–390)
PMV BLD AUTO: 10.8 FL (ref 8.9–12.7)
POTASSIUM SERPL-SCNC: 3.5 MMOL/L (ref 3.5–5.3)
PROT SERPL-MCNC: 6.1 G/DL (ref 6.4–8.2)
RBC # BLD AUTO: 3.72 MILLION/UL (ref 3.81–5.12)
SODIUM SERPL-SCNC: 136 MMOL/L (ref 136–145)
WBC # BLD AUTO: 7.5 THOUSAND/UL (ref 4.31–10.16)

## 2018-05-05 PROCEDURE — 99232 SBSQ HOSP IP/OBS MODERATE 35: CPT | Performed by: GENERAL PRACTICE

## 2018-05-05 PROCEDURE — 82948 REAGENT STRIP/BLOOD GLUCOSE: CPT

## 2018-05-05 PROCEDURE — 80053 COMPREHEN METABOLIC PANEL: CPT | Performed by: GENERAL PRACTICE

## 2018-05-05 PROCEDURE — 85025 COMPLETE CBC W/AUTO DIFF WBC: CPT | Performed by: GENERAL PRACTICE

## 2018-05-05 RX ORDER — CIPROFLOXACIN 500 MG/1
500 TABLET, FILM COATED ORAL 2 TIMES DAILY
Status: DISCONTINUED | OUTPATIENT
Start: 2018-05-05 | End: 2018-05-06 | Stop reason: HOSPADM

## 2018-05-05 RX ORDER — CIPROFLOXACIN 500 MG/1
500 TABLET, FILM COATED ORAL EVERY 12 HOURS SCHEDULED
Qty: 16 TABLET | Refills: 0 | Status: SHIPPED | OUTPATIENT
Start: 2018-05-05 | End: 2018-05-06

## 2018-05-05 RX ADMIN — HEPARIN SODIUM 5000 UNITS: 5000 INJECTION, SOLUTION INTRAVENOUS; SUBCUTANEOUS at 21:27

## 2018-05-05 RX ADMIN — CEFEPIME HYDROCHLORIDE 2000 MG: 2 INJECTION, SOLUTION INTRAVENOUS at 05:39

## 2018-05-05 RX ADMIN — CIPROFLOXACIN HYDROCHLORIDE 500 MG: 500 TABLET, FILM COATED ORAL at 09:04

## 2018-05-05 RX ADMIN — CIPROFLOXACIN HYDROCHLORIDE 500 MG: 500 TABLET, FILM COATED ORAL at 21:27

## 2018-05-05 RX ADMIN — HEPARIN SODIUM 5000 UNITS: 5000 INJECTION, SOLUTION INTRAVENOUS; SUBCUTANEOUS at 14:28

## 2018-05-05 RX ADMIN — INSULIN LISPRO 3 UNITS: 100 INJECTION, SOLUTION INTRAVENOUS; SUBCUTANEOUS at 13:05

## 2018-05-05 RX ADMIN — HEPARIN SODIUM 5000 UNITS: 5000 INJECTION, SOLUTION INTRAVENOUS; SUBCUTANEOUS at 05:48

## 2018-05-05 RX ADMIN — INSULIN LISPRO 1 UNITS: 100 INJECTION, SOLUTION INTRAVENOUS; SUBCUTANEOUS at 21:28

## 2018-05-05 NOTE — PLAN OF CARE

## 2018-05-05 NOTE — PROGRESS NOTES
[facility] Internal Medicine Progress Note  Patient: Moira Delgado 74 y o  female   MRN: [id number]  PCP: Mimi Espinal DO  Unit/Bed#: -01 Encounter: [id number]  Date Of Visit: 18    Assessment:    Principal Problem:    Urinary tract infection  Active Problems:    Type 2 diabetes mellitus ([facility] [address] )    Acute kidney injury ([facility] [address] )    Hyponatremia    Hypokalemia    Hyperbilirubinemia      Plan:      Urinary tract infection and bacteremia-e coli pansensitive   Assessment & Plan     pyelonephritis  Final uc and bc ecoli  fup BC ngtd  On cefepime-change to cipro  Urology consult appreciated; renal us without hydronephrosis       Hyponatremia   Assessment & Plan     Resolved with IVF             Acute kidney injury ([facility] [address] )   Assessment & Plan         Resolved with IVF   Hypokalemia   Assessment & Plan     replaced          Hyperbilirubinemia   Assessment & Plan     Likely secondary to acute infection/dehydration     resolved              Type 2 diabetes mellitus ([facility] [address] )   Assessment & Plan     Will need outpatient followup not on outpatient meds-a1c 8 2            VTE Pharmacologic Prophylaxis:   Pharmacologic: Heparin  Mechanical VTE Prophylaxis in Place: Yes    Patient Centered Rounds: I have performed bedside rounds with nursing staff today  Discussions with Specialists or Other Care Team Provider:     Education and Discussions with Family / Patient:     Time Spent for Care: 30 minutes  More than 50% of total time spent on counseling and coordination of care as described above  Current Length of Stay: 3 day(s)    Current Patient Status: Inpatient   Certification Statement: The patient will continue to require additional inpatient hospital stay due to uti    Discharge Plan: home    Code Status: Level 1 - Full Code      Subjective:   Feels much better   No c/o-wants to go home    Objective:     Vitals:   Temp (24hrs), Av 9 °F (37 2 °C), Min:98 4 °F (36 9 °C), Max:99 6 °F (37 6 °C)    HR:  [58-82] 58  Resp:  [18] 18  BP: (156-167)/(76-86) 166/79  SpO2:  [95 %-100 %] 96 %  Body mass index is 29 16 kg/m²  Input and Output Summary (last 24 hours): Intake/Output Summary (Last 24 hours) at 05/05/18 1159  Last data filed at 05/05/18 0900   Gross per 24 hour   Intake              490 ml   Output                0 ml   Net              490 ml       Physical Exam:     Physical Exam   Constitutional: She appears well-developed and well-nourished  HENT:   Head: Normocephalic and atraumatic  Eyes: Pupils are equal, round, and reactive to light  Cardiovascular: Normal rate and regular rhythm  Pulmonary/Chest: Effort normal and breath sounds normal    Abdominal: Soft  Bowel sounds are normal    Musculoskeletal: She exhibits no edema  Additional Data:     Labs:      Results from last 7 days  Lab Units 05/05/18  0538   WBC Thousand/uL 7 50   HEMOGLOBIN g/dL 11 3*   HEMATOCRIT % 34 3*   PLATELETS Thousands/uL 192   NEUTROS PCT % 67   LYMPHS PCT % 20   MONOS PCT % 11   EOS PCT % 1       Results from last 7 days  Lab Units 05/05/18  0538   SODIUM mmol/L 136   POTASSIUM mmol/L 3 5   CHLORIDE mmol/L 104   CO2 mmol/L 24   BUN mg/dL 17   CREATININE mg/dL 1 23   CALCIUM mg/dL 8 5   TOTAL PROTEIN g/dL 6 1*   BILIRUBIN TOTAL mg/dL 0 80   ALK PHOS U/L 171*   ALT U/L 50   AST U/L 38   GLUCOSE RANDOM mg/dL 138           * I Have Reviewed All Lab Data Listed Above  * Additional Pertinent Lab Tests Reviewed: All Labs Within Last 24 Hours Reviewed    Imaging:    Imaging Reports Reviewed Today Include:   Imaging Personally Reviewed by Myself Includes:      Recent Cultures (last 7 days):       Results from last 7 days  Lab Units 05/03/18  1236 05/02/18  1645 05/02/18  1644 05/02/18  0912   BLOOD CULTURE  No Growth at 24 hrs  No Growth at 24 hrs   Escherichia coli* Escherichia coli*  --    GRAM STAIN RESULT   --  Gram negative rods Gram negative rods  --    URINE CULTURE   --   --   --  >100,000 cfu/ml Escherichia coli*       Last 24 Hours Medication List:     Current Facility-Administered Medications:  acetaminophen 650 mg Oral Q6H PRN Sapna Howard MD   ciprofloxacin 500 mg Oral BID Katherine A MD Loretta   heparin (porcine) 5,000 Units Subcutaneous Q8H [address] Sapna Howard MD   insulin lispro 1-5 Units Subcutaneous HS Sapna Howard MD   insulin lispro 1-6 Units Subcutaneous TID AC Sapna Howard MD   morphine injection 1 mg Intravenous Q6H PRN Sapna Howard MD   ondansetron 4 mg Intravenous Q6H PRN Sapna Howard MD   simethicone 80 mg Oral Q6H PRN Spencer Wu PA-C        Today, Patient Was Seen By: Perla Espinoza MD    ** Please Note: Dragon 360 Dictation voice to text software may have been used in the creation of this document   **

## 2018-05-06 VITALS
WEIGHT: 154.32 LBS | DIASTOLIC BLOOD PRESSURE: 77 MMHG | HEART RATE: 59 BPM | OXYGEN SATURATION: 96 % | SYSTOLIC BLOOD PRESSURE: 170 MMHG | BODY MASS INDEX: 29.14 KG/M2 | RESPIRATION RATE: 18 BRPM | HEIGHT: 61 IN | TEMPERATURE: 98.7 F

## 2018-05-06 LAB
ANION GAP SERPL CALCULATED.3IONS-SCNC: 6 MMOL/L (ref 4–13)
BASOPHILS # BLD AUTO: 0.03 THOUSANDS/ΜL (ref 0–0.1)
BASOPHILS NFR BLD AUTO: 0 % (ref 0–1)
BUN SERPL-MCNC: 14 MG/DL (ref 5–25)
CALCIUM SERPL-MCNC: 8.5 MG/DL (ref 8.3–10.1)
CHLORIDE SERPL-SCNC: 104 MMOL/L (ref 100–108)
CO2 SERPL-SCNC: 29 MMOL/L (ref 21–32)
CREAT SERPL-MCNC: 1.16 MG/DL (ref 0.6–1.3)
EOSINOPHIL # BLD AUTO: 0.06 THOUSAND/ΜL (ref 0–0.61)
EOSINOPHIL NFR BLD AUTO: 1 % (ref 0–6)
ERYTHROCYTE [DISTWIDTH] IN BLOOD BY AUTOMATED COUNT: 14.4 % (ref 11.6–15.1)
GFR SERPL CREATININE-BSD FRML MDRD: 46 ML/MIN/1.73SQ M
GLUCOSE SERPL-MCNC: 166 MG/DL (ref 65–140)
GLUCOSE SERPL-MCNC: 182 MG/DL (ref 65–140)
HCT VFR BLD AUTO: 34.2 % (ref 34.8–46.1)
HGB BLD-MCNC: 11.3 G/DL (ref 11.5–15.4)
LYMPHOCYTES # BLD AUTO: 1.52 THOUSANDS/ΜL (ref 0.6–4.47)
LYMPHOCYTES NFR BLD AUTO: 22 % (ref 14–44)
MCH RBC QN AUTO: 30.5 PG (ref 26.8–34.3)
MCHC RBC AUTO-ENTMCNC: 33 G/DL (ref 31.4–37.4)
MCV RBC AUTO: 92 FL (ref 82–98)
MONOCYTES # BLD AUTO: 0.81 THOUSAND/ΜL (ref 0.17–1.22)
MONOCYTES NFR BLD AUTO: 12 % (ref 4–12)
NEUTROPHILS # BLD AUTO: 4.34 THOUSANDS/ΜL (ref 1.85–7.62)
NEUTS SEG NFR BLD AUTO: 64 % (ref 43–75)
NRBC BLD AUTO-RTO: 0 /100 WBCS
PLATELET # BLD AUTO: 233 THOUSANDS/UL (ref 149–390)
PMV BLD AUTO: 10.3 FL (ref 8.9–12.7)
POTASSIUM SERPL-SCNC: 4 MMOL/L (ref 3.5–5.3)
RBC # BLD AUTO: 3.7 MILLION/UL (ref 3.81–5.12)
SODIUM SERPL-SCNC: 139 MMOL/L (ref 136–145)
WBC # BLD AUTO: 6.84 THOUSAND/UL (ref 4.31–10.16)

## 2018-05-06 PROCEDURE — 82948 REAGENT STRIP/BLOOD GLUCOSE: CPT

## 2018-05-06 PROCEDURE — 80048 BASIC METABOLIC PNL TOTAL CA: CPT | Performed by: GENERAL PRACTICE

## 2018-05-06 PROCEDURE — 85025 COMPLETE CBC W/AUTO DIFF WBC: CPT | Performed by: GENERAL PRACTICE

## 2018-05-06 PROCEDURE — 99239 HOSP IP/OBS DSCHRG MGMT >30: CPT | Performed by: GENERAL PRACTICE

## 2018-05-06 RX ORDER — CIPROFLOXACIN 500 MG/1
500 TABLET, FILM COATED ORAL EVERY 12 HOURS SCHEDULED
Qty: 16 TABLET | Refills: 0 | Status: SHIPPED | OUTPATIENT
Start: 2018-05-06 | End: 2018-05-14

## 2018-05-06 RX ORDER — CIPROFLOXACIN 500 MG/1
500 TABLET, FILM COATED ORAL EVERY 12 HOURS SCHEDULED
Qty: 16 TABLET | Refills: 0 | Status: SHIPPED | OUTPATIENT
Start: 2018-05-06 | End: 2018-05-06

## 2018-05-06 RX ADMIN — HEPARIN SODIUM 5000 UNITS: 5000 INJECTION, SOLUTION INTRAVENOUS; SUBCUTANEOUS at 05:06

## 2018-05-06 RX ADMIN — INSULIN LISPRO 1 UNITS: 100 INJECTION, SOLUTION INTRAVENOUS; SUBCUTANEOUS at 08:12

## 2018-05-06 RX ADMIN — CIPROFLOXACIN HYDROCHLORIDE 500 MG: 500 TABLET, FILM COATED ORAL at 08:11

## 2018-05-06 NOTE — DISCHARGE SUMMARY
[facility]    Patient Information: Patrick Mnotes De Oca 74 y o  female MRN: [id number]  Unit/Bed#: -01 Encounter: [id number]    Discharging Physician / Practitioner: Kika Alonso MD  PCP: Lexis Schwartz DO  Admission Date: 5/2/2018  Discharge Date: 05/06/18    Reason for Admission: UTI    Discharge Diagnoses:     Principal Problem:    Urinary tract infection  Active Problems:    Type 2 diabetes mellitus ([facility] [address] )  Resolved Problems:    Acute kidney injury ([facility] [address] )    Hyponatremia    Hypokalemia    Hyperbilirubinemia      Consultations During Hospital Stay:  · urology    Procedures Performed:     ·     Significant Findings:     · Pyelonephritis  · E coli bacteremia    Incidental Findings:   · Poorly controlled NIDDM     Test Results Pending at Discharge (will require follow up): · Blood cultures bgtd at 48 hours     Outpatient Tests Requested:  · Follow up with urology  · Cbc and bmp 1 week    Complications:      Hospital Course:     Patrick Montes De Oca is a 74 y o  female patient who originally presented to the hospital on 5/2/2018 due to fever and back pain and abdominal pain        Urinary tract infection and bacteremia-e coli pansensitive   Assessment & Plan     pyelonephritis  Final uc and bc ecoli  fup BC ngtd  On cefepime-changed to cipro  Urology consult appreciated; renal us without hydronephrosis; outpatient urology follow up       Hyponatremia   Assessment & Plan     Resolved with IVF             Acute kidney injury ([facility] [address] )   Assessment & Plan           Resolved with IVF   Hypokalemia   Assessment & Plan     replaced          Hyperbilirubinemia   Assessment & Plan     Likely secondary to acute infection/dehydration     resolved              Type 2 diabetes mellitus ([facility] [address] )   Assessment & Plan     Will need outpatient followup not on outpatient meds-a1c 8 2-start metformin for discharge           Condition at Discharge: stable     Discharge Day Visit / Exam: Subjective:  No c/o-wants to go home  Vitals: Blood Pressure: 170/77 (05/06/18 0710)  Pulse: 59 (05/06/18 0710)  Temperature: 98 7 °F (37 1 °C) (05/06/18 0710)  Temp Source: Oral (05/06/18 0710)  Respirations: 18 (05/06/18 0710)  Height: 5' 1" (154 9 cm) (05/02/18 1844)  Weight - Scale: 70 kg (154 lb 5 2 oz) (05/02/18 1844)  SpO2: 96 % (05/06/18 0710)  Exam:   Physical Exam   Constitutional: She appears well-developed and well-nourished  HENT:   Head: Normocephalic and atraumatic  Eyes: Pupils are equal, round, and reactive to light  Cardiovascular: Normal rate and regular rhythm  Pulmonary/Chest: Effort normal and breath sounds normal    Abdominal: Soft  Bowel sounds are normal  She exhibits no distension  Musculoskeletal: She exhibits no edema  Discharge instructions/Information to patient and family:   See after visit summary for information provided to patient and family  Provisions for Follow-Up Care:  See after visit summary for information related to follow-up care and any pertinent home health orders  Disposition:     Home    For Discharges to [facility] SNF:   · Not Applicable to this Patient - Not Applicable to this Patient    Planned Readmission:      Discharge Statement:  I spent 40 minutes discharging the patient  This time was spent on the day of discharge  I had direct contact with the patient on the day of discharge  Greater than 50% of the total time was spent examining patient, answering all patient questions, arranging and discussing plan of care with patient as well as directly providing post-discharge instructions  Additional time then spent on discharge activities  Discharge Medications:  See after visit summary for reconciled discharge medications provided to patient and family  ** Please Note: Dragon 360 Dictation voice to text software may have been used in the creation of this document   **

## 2018-05-07 LAB
BACTERIA BLD CULT: ABNORMAL
GRAM STN SPEC: ABNORMAL
GRAM STN SPEC: ABNORMAL

## 2018-05-08 LAB
BACTERIA BLD CULT: NORMAL
BACTERIA BLD CULT: NORMAL

## 2018-05-08 NOTE — PROGRESS NOTES
+blood cultures  Was discharged home on Ciprofloxacin for bacteremia/pyelonephritis  Bacteria is susceptible to Ciprofloxacin  Spoke with patient who is feeling better and is asymptomatic  Patient has a follow up appointment with her pcp coming up

## 2018-05-09 ENCOUNTER — OFFICE VISIT (OUTPATIENT)
Dept: FAMILY MEDICINE CLINIC | Facility: CLINIC | Age: 73
End: 2018-05-09
Payer: MEDICARE

## 2018-05-09 VITALS
SYSTOLIC BLOOD PRESSURE: 140 MMHG | DIASTOLIC BLOOD PRESSURE: 92 MMHG | BODY MASS INDEX: 29.27 KG/M2 | HEART RATE: 73 BPM | RESPIRATION RATE: 18 BRPM | WEIGHT: 155 LBS | HEIGHT: 61 IN | OXYGEN SATURATION: 97 %

## 2018-05-09 DIAGNOSIS — E11.9 TYPE 2 DIABETES MELLITUS WITHOUT COMPLICATION, WITHOUT LONG-TERM CURRENT USE OF INSULIN (HCC): ICD-10-CM

## 2018-05-09 DIAGNOSIS — I10 ESSENTIAL HYPERTENSION: ICD-10-CM

## 2018-05-09 DIAGNOSIS — IMO0001 TRANSITION OF CARE PERFORMED WITH SHARING OF CLINICAL SUMMARY: Primary | ICD-10-CM

## 2018-05-09 PROBLEM — Z78.9 TRANSITION OF CARE PERFORMED WITH SHARING OF CLINICAL SUMMARY: Status: ACTIVE | Noted: 2018-05-09

## 2018-05-09 PROCEDURE — 99496 TRANSJ CARE MGMT HIGH F2F 7D: CPT | Performed by: NURSE PRACTITIONER

## 2018-05-09 RX ORDER — LISINOPRIL 10 MG/1
10 TABLET ORAL DAILY
Qty: 30 TABLET | Refills: 2 | Status: SHIPPED | OUTPATIENT
Start: 2018-05-09 | End: 2018-08-02 | Stop reason: SDUPTHER

## 2018-05-09 RX ORDER — ASPIRIN 81 MG/1
81 TABLET ORAL DAILY
Qty: 90 TABLET | Refills: 0
Start: 2018-05-09 | End: 2019-06-06 | Stop reason: SDUPTHER

## 2018-05-09 NOTE — PROGRESS NOTES
Assessment/Plan:    No problem-specific Assessment & Plan notes found for this encounter  Diagnoses and all orders for this visit:    Transition of care performed with sharing of clinical summary    Type 2 diabetes mellitus without complication, without long-term current use of insulin ([facility] [address] )  -     Comprehensive metabolic panel; Future  -     HEMOGLOBIN A1C W/ EAG ESTIMATION; Future  -     Lipid panel; Future  -     metFORMIN (GLUCOPHAGE) 500 mg tablet; Take 1 tablet (500 mg total) by mouth 2 (two) times a day with meals for 30 days  -     aspirin (ECOTRIN LOW STRENGTH) 81 mg EC tablet; Take 1 tablet (81 mg total) by mouth daily for 90 days    Essential hypertension  -     lisinopril (ZESTRIL) 10 mg tablet; Take 1 tablet (10 mg total) by mouth daily for 30 days  -     aspirin (ECOTRIN LOW STRENGTH) 81 mg EC tablet; Take 1 tablet (81 mg total) by mouth daily for 90 days          Subjective:      Patient ID: Umberto Jimenez is a 74 y o  female  Pt was hospitalized - with acute Pyelonephritis, Ecoli bacteremia and new onset diabetes  Pt had elevated bilirubin which improved with hydration  DM- initial A1c is 8 1  Pt started on metformin in hospital  She does see her eye doctor regularly  Pt does follow a low sugar, low carb diet  She does not exercise  She does babysit her toddler grandchildren a few days during the week  UTI- continues with Cipro  Prior to hospitalization, pt had not been seen in over a year by a doctor  She had previously been taking BP meds and DM medications, but she stopped these about a year ago  HTN- pt stopped meds a year ago  Her BP is elevated today  She was not restarted in BP meds while in the hospital   Pt  just  a month ago  She does live alone  The following portions of the patient's history were reviewed and updated as appropriate:   She  has a past medical history of Diabetes mellitus ([facility] [address] ); Eczema;  Hypertension; Malignant neoplasm of skin; and Nonmelanoma skin cancer  She   Patient Active Problem List    Diagnosis Date Noted    Transition of care performed with sharing of clinical summary 05/09/2018    Essential hypertension 05/09/2018    Type 2 diabetes mellitus ([facility] [address] ) 05/02/2018    Urinary tract infection 05/02/2018     She  has a past surgical history that includes Eye surgery and Skin biopsy  Her family history includes Diabetes in her mother; Skin cancer in her mother  She  reports that she has never smoked  She has never used smokeless tobacco  She reports that she does not drink alcohol or use drugs  Current Outpatient Prescriptions   Medication Sig Dispense Refill    aspirin (ECOTRIN LOW STRENGTH) 81 mg EC tablet Take 1 tablet (81 mg total) by mouth daily for 90 days 90 tablet 0    ciprofloxacin (CIPRO) 500 mg tablet Take 1 tablet (500 mg total) by mouth every 12 (twelve) hours for 8 days 16 tablet 0    lisinopril (ZESTRIL) 10 mg tablet Take 1 tablet (10 mg total) by mouth daily for 30 days 30 tablet 2    metFORMIN (GLUCOPHAGE) 500 mg tablet Take 1 tablet (500 mg total) by mouth 2 (two) times a day with meals for 30 days 60 tablet 2     No current facility-administered medications for this visit  Current Outpatient Prescriptions on File Prior to Visit   Medication Sig    ciprofloxacin (CIPRO) 500 mg tablet Take 1 tablet (500 mg total) by mouth every 12 (twelve) hours for 8 days    [DISCONTINUED] metFORMIN (GLUCOPHAGE) 500 mg tablet Take 1 tablet (500 mg total) by mouth daily with breakfast     No current facility-administered medications on file prior to visit  She is allergic to latex and penicillin g     Review of Systems   Constitutional: Negative  Negative for fatigue, fever and unexpected weight change  HENT: Negative  Negative for congestion  Eyes: Negative  Respiratory: Negative for cough and chest tightness  Cardiovascular: Negative  Negative for chest pain, palpitations and leg swelling  Gastrointestinal: Negative  Endocrine: Negative for polydipsia and polyuria  Genitourinary: Positive for urgency  Musculoskeletal: Negative  Negative for joint swelling  Skin: Negative  Negative for rash  Allergic/Immunologic: Negative for immunocompromised state  Neurological: Negative  Hematological: Negative for adenopathy  Psychiatric/Behavioral: Negative  All other systems reviewed and are negative  Objective:      /92 (BP Location: Left arm, Patient Position: Sitting)   Pulse 73   Resp 18   Ht 5' 1" (1 549 m)   Wt 70 3 kg (155 lb)   SpO2 97%   BMI 29 29 kg/m²          Physical Exam   Constitutional: She is oriented to person, place, and time  She appears well-developed and well-nourished  No distress  HENT:   Head: Normocephalic and atraumatic  Right Ear: External ear normal    Left Ear: External ear normal    Nose: Nose normal    Mouth/Throat: Oropharynx is clear and moist    Eyes: Conjunctivae and EOM are normal  Pupils are equal, round, and reactive to light  Neck: Normal range of motion  Neck supple  No thyromegaly present  Cardiovascular: Normal rate, regular rhythm and normal heart sounds  No murmur heard  Pulmonary/Chest: Effort normal and breath sounds normal  No respiratory distress  She has no wheezes  Abdominal: Soft  Bowel sounds are normal  She exhibits no distension  There is no tenderness  Musculoskeletal: Normal range of motion  She exhibits no edema, tenderness or deformity  Lymphadenopathy:     She has no cervical adenopathy  Neurological: She is alert and oriented to person, place, and time  Skin: Skin is warm and dry  No rash noted  No pallor  Psychiatric: She has a normal mood and affect  Her behavior is normal  Judgment and thought content normal    Nursing note and vitals reviewed

## 2018-05-09 NOTE — PATIENT INSTRUCTIONS
Diabetes- continue metformin  Continue to see eye doctor  Follow low carbohydrate diet  Limit breads, pasta, cakes, candies, etc    Plan for a ten minute walk after each meal  Goal- 50 grams of carbs per meal   Pt cannot afford glucose meter and strips and supplies  Diabetes in the Older Adult   AMBULATORY CARE:   What you need to know if you are an older adult with diabetes:  Older adults with diabetes are at risk for heart disease, stroke, kidney disease, blindness, and nerve damage  You may also be at risk for any of the following:  · Poor nutrition or low blood sugar levels    · Confusion or problems with memory, attention, or learning new things    · Trouble controlling urination or frequent urinary tract infections    · Trouble with coordination or balance    · Falls and injuries    · Pain    · Depression    · Open sores on your legs or feet  The ABCs of diabetes: The ABCs stand for certain things you can do to manage or prevent problems caused by diabetes:  · A  stands for A1c test   This test shows the average amount of sugar in your blood over the past 2 to 3 months  High levels of sugar in your blood can cause damage to your heart, blood vessels, kidneys, feet, and eyes  Most older adults with diabetes should have an A1c level less than 7 5  Ask your healthcare provider if this A1c goal is right for you  Your provider can help you make changes if your A1c is too high  · B  stands for blood pressure   High blood pressure can increase your risk for a heart attack, stroke, or kidney disease  Most older adults with diabetes should have a systolic blood pressure (first number) of 140  Your diastolic blood pressure (second number) should be below 90  Ask your healthcare provider if these blood pressure goals are right for you  · C  stands for cholesterol   High levels of cholesterol can block your arteries and cause a heart attack or stroke   Ask your healthcare provider what your cholesterol levels should be  · S  stands for stop smoking   Nicotine and other chemicals in cigarettes and cigars can cause lung damage and make it more difficult to manage your diabetes  Call 911 if you have any of the following:   · You have any of the following signs of a stroke:      ¨ Numbness or drooping on one side of your face     ¨ Weakness in an arm or leg    ¨ Confusion or difficulty speaking    ¨ Dizziness, a severe headache, or vision loss    · You have any of the following signs of a heart attack:      ¨ Squeezing, pressure, or pain in your chest that lasts longer than 5 minutes or returns    ¨ Discomfort or pain in your back, neck, jaw, stomach, or arm     ¨ Trouble breathing    ¨ Nausea or vomiting    ¨ Lightheadedness or a sudden cold sweat, especially with chest pain or trouble breathing  Seek care immediately if:   · You have severe abdominal pain, or the pain spreads to your back  You may also be vomiting  · You have trouble staying awake or focusing  · You are shaking or sweating  · You have blurred or double vision  · Your breath has a fruity, sweet smell  · Your breathing is deep and labored, or rapid and shallow  · Your heartbeat is fast and weak  · You fall and get hurt  Contact your healthcare provider if:   · You are vomiting or have diarrhea  · You have an upset stomach and cannot eat the foods on your meal plan  · You feel weak or more tired than usual      · You feel dizzy, have headaches, or are easily irritated  · Your skin is red, warm, dry, or swollen  · You have a wound that does not heal      · You have numbness in your arms or legs  · You have trouble coping with your illness, or you feel anxious or depressed  · You have problems with your memory  · You have changes in your vision  · You have questions or concerns about your condition or care    Treatment for diabetes  includes keeping your blood sugar at a normal level  You must eat the right foods, and exercise regularly  You may need medicine if you cannot control your blood sugar level with nutrition and exercise  You may also need medicine to lower your blood pressure or cholesterol, or medicine to prevent blood clots  Manage the ABCs and prevent problems caused by diabetes:   · Check your blood sugar levels as directed  Your healthcare provider will tell you when and how often to check during the day  Your healthcare provider will also tell you what your blood sugar levels should be before and after a meal  You may need to check for ketones in your urine or blood if your level is higher than directed  Write down your results and show them to your healthcare provider  Your provider may use the results to make changes to your medicine, food, or exercise schedules  Ask your healthcare provider for more information about how to treat a high or low blood sugar level  · Follow your meal plan as directed  A dietitian will help you make a meal plan to keep your blood sugar level steady and make sure you get enough nutrition  Do not skip meals  Your blood sugar level may drop too low if you have taken diabetes medicine and do not eat  Ask your healthcare provider about programs in your community that can deliver the meals to your home  · Try to be active for 30 to 60 minutes most days of the week  Exercise can help keep your blood sugar level steady, decrease your risk of heart disease, and help you lose weight  It can also help improve your balance and decrease your risk for falls  Work with your healthcare provider to create an exercise plan  Ask a family member or friend to exercise with you  Start slow and exercise for 5 to 10 minutes at a time  Examples of activities include walking or swimming  Include muscle strengthening activities 2 to 3 days each week  Include balance training 2 to 3 times each week   Activities that help increase balance include yoga and alisa chi      · Maintain a healthy weight  Ask your healthcare provider how much you should weigh  A healthy weight can help you control your diabetes and prevent heart disease  Ask your provider to help you create a weight loss plan if you are overweight  Together you can set manageable weight loss goals  · Do not smoke  Ask your healthcare provider for information if you currently smoke and need help to quit  Do not use e-cigarettes or smokeless tobacco in place of cigarettes or to help you quit  They still contain nicotine  · Manage stress  Stress may increase your blood sugar level  Deep breathing, muscle relaxation, and music may help you relax  Ask your healthcare provider for more information about these practices  Other ways to manage your diabetes:   · Check your feet every day for sores  Look at your whole foot, including the bottom, and between and under your toes  Check for wounds, corns, and calluses  Use a mirror to see the bottom of your feet  The skin on your feet may be shiny, tight, dry, or darker than normal  Your feet may also be cold and pale  Feel your feet by running your hands along the tops, bottoms, sides, and between your toes  Redness, swelling, and warmth are signs of blood flow problems that can lead to a foot ulcer  Do not try to remove corns or calluses yourself  · Wear medical alert identification  Wear medical alert jewelry or carry a card that says you have diabetes  Ask your healthcare provider where to get these items  · Ask about vaccines  You have a higher risk for serious illness if you get the flu, pneumonia, or hepatitis  Ask your healthcare provider if you should get a flu, pneumonia, shingles, or hepatitis B vaccine, and when to get the vaccine  · Keep all appointments  You may need to return to have your A1c checked every 3 months  You will need to return at least once each year to have your feet checked   You will need an eye exam once a year to check for retinopathy  You will also need urine tests every year to check for kidney problems  You may need tests to monitor for heart disease  Write down your questions so you remember to ask them during your visits  · Get help from family and friends  You may need help checking your blood sugar level, giving insulin injections, or preparing your meals  Ask your family and friends to help you with these tasks  Talk to your healthcare provider if you do not have someone at home to help you  A healthcare provider can come to your home to help you with these tasks  Follow up with your healthcare provider as directed: You may need to return to have your A1c checked every 3 months  You will need to return at least once each year to have your feet checked  You will need an eye exam once a year to check for retinopathy  You will also need urine tests every year to check for kidney problems  You may need tests to monitor for heart disease  Write down your questions so you remember to ask them during your visits  © 2017 [facility] Information is for End User's use only and may not be sold, redistributed or otherwise used for commercial purposes  All illustrations and images included in CareNotes® are the copyrighted property of [employer]  or [employer]  The above information is an  only  It is not intended as medical advice for individual conditions or treatments  Talk to your doctor, nurse or pharmacist before following any medical regimen to see if it is safe and effective for you  Basic Carbohydrate Counting   AMBULATORY CARE:   Carbohydrate counting  is a way to plan your meals by counting the amount of carbohydrate in foods  Carbohydrates are the sugars, starches, and fiber found in fruit, grains, vegetables, and milk products  Carbohydrates increase your blood sugar levels   Carbohydrate counting can help you eat the right amount of carbohydrate to keep your blood sugar levels under control  What you need to know about planning meals using carbohydrate counting:  · A dietitian or healthcare provider will help you develop a healthy meal plan that works best for you  You will be taught how much carbohydrate to eat or drink for each meal and snack  Your meal plan will be based on your age, weight, usual food intake, and physical activity level  If you have diabetes, it will also include your blood sugar levels and diabetes medicine  Once you know how much carbohydrate you should eat, you can decide what type of food you want to eat  · You will need to know what foods contain carbohydrate and how much they contain  Keep track of the amount of carbohydrate in meals and snacks in order to follow your meal plan  Do not avoid carbohydrates or skip meals  Your blood sugar may fall too low if you do not eat enough carbohydrate or you skip meals  Foods that contain carbohydrate:   · Breads:  Each serving of food listed below contains about 15 g of carbohydrate   ¨ 1 slice of bread (1 ounce) or 1 flour or corn tortilla (6 inch)    ¨ ½ of a hamburger bun or ¼ of a large bagel (about 1 ounce)    ¨ 1 pancake (about 4 inches across and ¼ inch thick)    · Cereals and grains:  Serving sizes of ready-to-eat cereals vary  Look at the serving size and the total carbohydrate amount listed on the food label  Each serving of food listed below contains about 15 g of carbohydrate   ¨ ¾ cup of dry, unsweetened, ready-to-eat cereal or ¼ cup of low-fat granola     ¨ ½ cup of oatmeal or other cooked cereal     ¨ ? cup of cooked rice or pasta    · Starchy vegetables and beans:  Each serving of food listed below contains about 15 g of carbohydrate       ¨ ½ cup of corn, green peas, sweet potatoes, or mashed potatoes    ¨ ¼ of a large baked potato    ¨ ½ cup of beans, lentils, and peas (garbanzo, marin, kidney, white, split, black-eyed)    · Crackers and snacks: Each serving of food listed below contains about 15 g of carbohydrate   ¨ 3 sirena cracker squares or 8 animal crackers     ¨ 6 saltine-type crackers    ¨ 3 cups of popcorn or ¾ ounce of pretzels, potato chips, or tortilla chips    · Fruit:  Each serving of food listed below contains about 15 g of carbohydrate   ¨ 1 small (4 ounce) piece of fresh fruit or ¾ to 1 cup of fresh fruit    ¨ ½ cup of canned or frozen fruit, packed in natural juice    ¨ ½ cup (4 ounces) of unsweetened fruit juice    ¨ 2 tablespoons of dried fruit    · Desserts or sugary foods:  Each serving of food listed below contains about 15 g of carbohydrate   ¨ 2-inch square unfrosted cake or brownie     ¨ 2 small cookies    ¨ ½ cup of ice cream, frozen yogurt, or nondairy frozen yogurt    ¨ ¼ cup of sherbet or sorbet    ¨ 1 tablespoon of regular syrup, jam, or jelly    ¨ 2 tablespoons of light syrup    · Milk and yogurt:  Foods from the milk group contain about 12 g of carbohydrate per serving  ¨ 1 cup of fat-free or low-fat milk    ¨ 1 cup of soy milk    ¨ ? cup of fat-free, yogurt sweetened with artificial sweetener    · Non-starchy vegetables:  Each serving contains about 5 g of carbohydrate   Three servings of non-starch vegetables count as 1 carbohydrate serving  ¨ ½ cup of cooked vegetables or 1 cup of raw vegetables  This includes beets, broccoli, cabbage, cauliflower, cucumber, mushrooms, tomatoes, and zucchini    ¨ ½ cup of vegetable juice  How to use carbohydrate counting to plan meals:   · Count carbohydrate amounts using serving sizes:      ¨ Pasta dinner example: You plan to have pasta, tossed salad, and an 8-ounce glass of milk  Your healthcare provider tells you that you may have 4 carbohydrate servings for dinner  One carbohydrate serving of pasta is ? cup  One cup of pasta will equal 3 carbohydrate servings  An 8-ounce glass of milk will count as 1 carbohydrate serving   These amounts of food would equal 4 carbohydrate servings  One cup of tossed salad does not count toward your carbohydrate servings  · Count carbohydrate amounts using food labels:  Find the total amount of carbohydrate in a packaged food by reading the food label  Food labels tell you the serving size of the food and the total carbohydrate amount in each serving  Find the serving size on the food label and then decide how many servings you will eat  Multiply the number of servings you plan to eat by the carbohydrate amount per serving  ¨ Granola bar snack example: Your meal plan allows you to have 2 carbohydrate servings (30 grams) of carbohydrate for a snack  You plan to eat 1 package of granola bars, which contains 2 bars  According to the food label, the serving size of food in this package is 1 bar  Each serving (1 bar) contains 25 grams of carbohydrate  The total amount of carbohydrate in this package of granola bars would be 50 g  Based on your meal plan, you should eat only 1 bar  Follow up with your healthcare provider as directed:  Write down your questions so you remember to ask them during your visits  © 2017 [facility] Information is for End User's use only and may not be sold, redistributed or otherwise used for commercial purposes  All illustrations and images included in CareNotes® are the copyrighted property of [employer]  or [employer]  The above information is an  only  It is not intended as medical advice for individual conditions or treatments  Talk to your doctor, nurse or pharmacist before following any medical regimen to see if it is safe and effective for you  UTI- continue Cipro until complete  Drink plenty of water  Follow up in 3 months

## 2018-05-15 PROBLEM — Z87.448 HISTORY OF PYELONEPHRITIS: Status: ACTIVE | Noted: 2018-05-15

## 2018-05-15 PROBLEM — N13.39 OTHER HYDRONEPHROSIS: Status: ACTIVE | Noted: 2018-05-15

## 2018-05-15 PROBLEM — N39.0 URINARY TRACT INFECTION: Status: RESOLVED | Noted: 2018-05-02 | Resolved: 2018-05-15

## 2018-05-15 NOTE — PROGRESS NOTES
Problem List Items Addressed This Visit     Other hydronephrosis - Primary     Patient was seen to have left-sided hydronephrosis associated with pyelonephritis, a follow-up ultrasound showed no hydronephrosis  Plan:  The patient does not require follow-up on a long-term basis unless this develops again in the future  If she develops recurrent pyelonephritis, we will explore the use of Premarin cream as she does endorse a history of vaginal dryness and discomfort         Relevant Orders    POCT urine dip (Completed)    History of pyelonephritis     History of left-sided pyelonephritis, this is now resolved  She does not have recurrent urinary tract infections and her urinalysis is negative today  If she has recurrent infections in the future we will explore the use of Premarin cream vaginally as she endorses some history of atrophic vaginitis we will also explore methenamine hippurate, in the future if this becomes necessary  Relevant Orders    POCT urine dip (Completed)    Urge incontinence     With occasional leakage of urine, previously responded well to Enablex and requested a prescription for this today    I did  her on the use of anticholinergic medications in older patients who told her that should she have any confusion, unsteadiness on her feet, or any other untoward effects to please let me know and we will change her to Myrbetriq         Relevant Medications    darifenacin (ENABLEX) 15 MG 24 hr tablet            She will follow up in 6 months for assessment of urge urinary incontinence, if this has not improved with medication we will proceed to percutaneous tibial nerve stimulation    Assessment and plan:       Please see problem oriented charting for the assessment plan of today's urological complaints  -     Thomas Thompson MD      Chief Complaint     History of left pyelonephritis  Urge incontinence  Hydronephrosis      History of Present Illness     Radha Harrington is a 74 y o  woman with a past medical history as listed below who was recently hospitalized for pyelonephritis in early May 2018  She was found to have mild left-sided hydronephrosis at that time without associated kidney stone but was told that she may have passed a kidney stone  She denies previous stone episodes and previous stone surgeries  At the time of her pyelonephritis she was having bilateral lower abdominal pain which radiated from the lower quadrants to the lower midline of the abdomen, she had no other associated symptoms apart from some left-sided flank pain at that time  There were no aggravating or alleviating factors  She has no flank pain, discomfort, malaise, or fever at this time  A follow-up ultrasound showed no hydronephrosis within her kidneys  I told the patient that this was likely due to hydronephrosis and she does not require long-term imaging follow-up  When asked about recurrent infections she states that she gets a urinary tract infection every few years and her last infection was 6 years ago prior to the recent bout of pyelonephritis  She states that she feels she got this infection because she has been looking after her  who recently passed away and was not collecting her own health  She also mentions a history of urge incontinence which previously responded well to Enablex and requested a prescription for this, I am happy to give her this but did tell her that these medications are occasionally associated with cognitive dysfunction in patients her age and she will look out for the symptoms  On review of systems today she denies dysuria,, hesitancy of urination, gross hematuria, urgency, she does have nocturia 1-2 times per night, she feels empty after voiding and stream quality is good        Urine dip analysis today is negative for leukocytes, nitrites, blood, protein, ketones, and glucose and is clear yellow    Review of systems otherwise negative  Detailed Urologic History     - please refer to HPI    Review of Systems     Review of Systems   Constitutional: Negative  HENT: Negative  Eyes: Negative  Respiratory: Negative  Cardiovascular: Negative  Gastrointestinal: Negative  Endocrine: Negative  Genitourinary:        Urge incontinence   Musculoskeletal: Negative  Skin: Negative  Allergic/Immunologic: Negative  Neurological: Negative  Hematological: Negative  Psychiatric/Behavioral: Negative  Allergies     Allergies   Allergen Reactions    Latex Rash    Penicillin G Rash       Physical Exam     Physical Exam   Constitutional: She is oriented to person, place, and time  She appears well-developed and well-nourished  No distress  HENT:   Head: Normocephalic and atraumatic  Right Ear: External ear normal    Left Ear: External ear normal    Nose: Nose normal    Mouth/Throat: Oropharynx is clear and moist  No oropharyngeal exudate  Eyes: Conjunctivae and EOM are normal  Pupils are equal, round, and reactive to light  Right eye exhibits no discharge  Left eye exhibits no discharge  No scleral icterus  Neck: Normal range of motion  Neck supple  No tracheal deviation present  Cardiovascular: Normal rate, regular rhythm and intact distal pulses  Pulmonary/Chest: Effort normal  No stridor  No respiratory distress  She has no wheezes  She exhibits no tenderness  Abdominal: Soft  She exhibits no distension and no mass  There is no tenderness  There is no rebound and no guarding  No hernia  No CVA tenderness   Musculoskeletal: Normal range of motion  She exhibits no edema, tenderness or deformity  Neurological: She is alert and oriented to person, place, and time  No cranial nerve deficit  Coordination normal    Skin: Skin is warm and dry  No rash noted  She is not diaphoretic  No erythema  No pallor  Skin changes associated with normal aging are present   Psychiatric: She has a normal mood and affect   Her behavior is normal  Judgment and thought content normal    Nursing note and vitals reviewed            Vital Signs  Vitals:    05/16/18 1443   BP: 136/90   BP Location: Left arm   Patient Position: Sitting   Cuff Size: Adult   Pulse: 74   Weight: 66 7 kg (147 lb)   Height: 5' 1" (1 549 m)         Current Medications       Current Outpatient Prescriptions:     aspirin (ECOTRIN LOW STRENGTH) 81 mg EC tablet, Take 1 tablet (81 mg total) by mouth daily for 90 days, Disp: 90 tablet, Rfl: 0    lisinopril (ZESTRIL) 10 mg tablet, Take 1 tablet (10 mg total) by mouth daily for 30 days, Disp: 30 tablet, Rfl: 2    metFORMIN (GLUCOPHAGE) 500 mg tablet, Take 1 tablet (500 mg total) by mouth 2 (two) times a day with meals for 30 days, Disp: 60 tablet, Rfl: 2    darifenacin (ENABLEX) 15 MG 24 hr tablet, Take 1 tablet (15 mg total) by mouth daily, Disp: 30 tablet, Rfl: 3      Active Problems     Patient Active Problem List   Diagnosis    Type 2 diabetes mellitus (HCC)    Transition of care performed with sharing of clinical summary    Essential hypertension    Other hydronephrosis    History of pyelonephritis    Urge incontinence         Past Medical History     Past Medical History:   Diagnosis Date    Diabetes mellitus ([facility] [address] )     Patient states "I quit my medication"    Eczema     Hypertension     Malignant neoplasm of skin     LAST ASSESSED: [id number]    Nonmelanoma skin cancer     LAST ASSESSED: [id number]         Surgical History     Past Surgical History:   Procedure Laterality Date    EYE SURGERY      catarcts removal both    SKIN BIOPSY      face and arm         Family History     Family History   Problem Relation Age of Onset    Diabetes Mother     Skin cancer Mother          Social History     Social History     History   Smoking Status    Never Smoker   Smokeless Tobacco    Never Used         Pertinent Lab Values     Lab Results   Component Value Date    CREATININE 1 16 05/06/2018           Pertinent Imaging       The patient's images were reviewed by me personally and also in real time with them in the examination room using our PACS imaging system  The imaging findings are significant for very mild hydronephrosis and fullness of the left collecting system on the CT scan from earlier this month, a follow-up ultrasound showed no abnormalities

## 2018-05-15 NOTE — PATIENT INSTRUCTIONS
Hydronephrosis   WHAT YOU NEED TO KNOW:   What is hydronephrosis? Hydronephrosis is swelling in one or both kidneys caused by urine buildup  Urine normally flows from the kidneys to the bladder through tubes called ureters  A blockage in the ureters can prevent urine from flowing properly  Urine flow may also be prevented or slowed if your kidneys do not work correctly  Urine flows back into your urinary tract  Pressure builds up in the kidney and causes swelling  What increases my risk for hydronephrosis? · Nerve damage or narrowed blood vessels    · Kidney stones, blood clots, or tumors that cause a blockage    · Urinary tract infections    · Body changes during pregnancy    · Enlarged prostate  What are the signs and symptoms of hydronephrosis? You may have no signs or symptoms, or you may have any of the following:  · Frequent urinary tract infections    · Mild or severe lower back pain that may spread to the groin    · Urinating little or not at all, even with an urge to urinate    · Dribbling urine, or loss of urine control    · Blood or pus in your urine    · Nausea, vomiting, fever, or chills    · Abdominal fullness or swelling    · Weight gain that you cannot explain  How is hydronephrosis diagnosed? Your healthcare provider will examine you and ask you about your signs and symptoms  He may also feel your abdomen or pelvis for any pain or swelling  You may also need any of the following:  · Blood tests  show if your kidneys are working properly or have a blockage  · Kidney function tests  show how well your kidneys are working  · X-rays  may be taken of your kidneys, bladder, and ureters  You may need to have dye injected into your kidneys before the x-rays to help healthcare providers find the blockage  Tell the healthcare provider if you have ever had an allergic reaction to contrast dye  · Urine tests  show how much urine your body is removing   They may also show if you have infection, blood, or protein in your urine  This may mean your kidneys are not working as they should  · A CT scan  (CAT scan) uses an x-ray and a computer to take pictures of your kidneys, bladder, and ureters  The pictures may show a kidney stone or other obstruction  · An ultrasound  may be used to show your kidney or bladder size, and if either is swollen  Ultrasound can also be used to find kidney stones  You may need to have a CT and an ultrasound together to find a blockage  How is hydronephrosis treated? Treatment may help keep your kidneys healthy, and prevent infection  You may need the following:  · A renal diet  is a meal plan that includes foods that are low in sodium (salt), potassium, and protein  Your healthcare provider may also tell you to eat and drink more vegetables and juices  · Stone removal  may be used to remove the kidney stones that are slowing or blocking your urine flow  Your healthcare provider may use strong sound waves called shock wave therapy to break up large kidney stones  This will help make them small enough for you to pass them when you urinate  · Catheter or stent placement  may be needed to help increase your urine flow  You may need a catheter (flexible tube) placed directly into your bladder to drain urine  Your healthcare provider may place a hard plastic tube called a stent inside your urinary tract to help urine pass from your kidney to your bladder  · Surgery  may be needed to remove part or all of your kidney if it is not working properly  Your prostate may need to be removed if it is so large that it is blocking urine flow  What are the risks of hydronephrosis? Swelling in one or both kidneys from too much urine buildup may lead to long-term kidney damage  Partial blockages may cause loss of urine control  Severe hydronephrosis may cause a blood infection called sepsis  Sepsis is toxin (poison) buildup in your blood   It happens when your kidneys cannot flush toxins out of your body  It could also paralyze your intestines  Your kidneys could fail without treatment  These conditions may be life-threatening  When should I contact my healthcare provider? · Your abdomen feels full  · You have a change in how much or how often you urinate  · You urinate more times at night and in larger amounts than during the day  · You have mild lower back pain or pain on one side when you urinate  When should I seek immediate care? · You have severe, stabbing back pain  · You have blood in your urine  · You cannot urinate, or you urinate very little  CARE AGREEMENT:   You have the right to help plan your care  Learn about your health condition and how it may be treated  Discuss treatment options with your caregivers to decide what care you want to receive  You always have the right to refuse treatment  The above information is an  only  It is not intended as medical advice for individual conditions or treatments  Talk to your doctor, nurse or pharmacist before following any medical regimen to see if it is safe and effective for you  © 2017 [employer] Information is for End User's use only and may not be sold, redistributed or otherwise used for commercial purposes  All illustrations and images included in CareNotes® are the copyrighted property of [employer]  or [employer]

## 2018-05-15 NOTE — ASSESSMENT & PLAN NOTE
History of left-sided pyelonephritis, this is now resolved  She does not have recurrent urinary tract infections and her urinalysis is negative today  If she has recurrent infections in the future we will explore the use of Premarin cream vaginally as she endorses some history of atrophic vaginitis we will also explore methenamine hippurate, in the future if this becomes necessary

## 2018-05-16 ENCOUNTER — OFFICE VISIT (OUTPATIENT)
Dept: UROLOGY | Facility: CLINIC | Age: 73
End: 2018-05-16
Payer: MEDICARE

## 2018-05-16 VITALS
DIASTOLIC BLOOD PRESSURE: 90 MMHG | BODY MASS INDEX: 27.75 KG/M2 | HEART RATE: 74 BPM | SYSTOLIC BLOOD PRESSURE: 136 MMHG | HEIGHT: 61 IN | WEIGHT: 147 LBS

## 2018-05-16 DIAGNOSIS — Z87.448 HISTORY OF PYELONEPHRITIS: ICD-10-CM

## 2018-05-16 DIAGNOSIS — N13.39 OTHER HYDRONEPHROSIS: Primary | ICD-10-CM

## 2018-05-16 DIAGNOSIS — N39.41 URGE INCONTINENCE: ICD-10-CM

## 2018-05-16 LAB
SL AMB  POCT GLUCOSE, UA: NORMAL
SL AMB LEUKOCYTE ESTERASE,UA: NORMAL
SL AMB POCT BILIRUBIN,UA: NORMAL
SL AMB POCT BLOOD,UA: NORMAL
SL AMB POCT CLARITY,UA: CLEAR
SL AMB POCT COLOR,UA: YELLOW
SL AMB POCT KETONES,UA: NORMAL
SL AMB POCT NITRITE,UA: NORMAL
SL AMB POCT PH,UA: 5
SL AMB POCT SPECIFIC GRAVITY,UA: 1.01
SL AMB POCT URINE PROTEIN: NORMAL
SL AMB POCT UROBILINOGEN: NORMAL

## 2018-05-16 PROCEDURE — 81002 URINALYSIS NONAUTO W/O SCOPE: CPT | Performed by: UROLOGY

## 2018-05-16 PROCEDURE — 99214 OFFICE O/P EST MOD 30 MIN: CPT | Performed by: UROLOGY

## 2018-05-16 RX ORDER — DARIFENACIN HYDROBROMIDE 15 MG/1
15 TABLET, EXTENDED RELEASE ORAL DAILY
Qty: 30 TABLET | Refills: 3 | Status: SHIPPED | OUTPATIENT
Start: 2018-05-16 | End: 2018-08-08 | Stop reason: SDUPTHER

## 2018-05-16 NOTE — LETTER
May 16, 2018     Jessica Ferguson, [address]  [address] [facility]  [address]    Patient: Radha Harrington   YOB: 1945   Date of Visit: 5/16/2018       Dear Dr Trixie Celeste:    Thank you for referring Radha Harrington to me for evaluation  Below are my notes for this consultation  If you have questions, please do not hesitate to call me  I look forward to following your patient along with you  Sincerely,        Thomas Thompson MD        CC: No Recipients  Thomas Thompson MD  5/16/2018  4:54 PM  Sign at close encounter       Problem List Items Addressed This Visit     Other hydronephrosis - Primary     Patient was seen to have left-sided hydronephrosis associated with pyelonephritis, a follow-up ultrasound showed no hydronephrosis  Plan:  The patient does not require follow-up on a long-term basis unless this develops again in the future  If she develops recurrent pyelonephritis, we will explore the use of Premarin cream as she does endorse a history of vaginal dryness and discomfort         Relevant Orders    POCT urine dip (Completed)    History of pyelonephritis     History of left-sided pyelonephritis, this is now resolved  She does not have recurrent urinary tract infections and her urinalysis is negative today  If she has recurrent infections in the future we will explore the use of Premarin cream vaginally as she endorses some history of atrophic vaginitis we will also explore methenamine hippurate, in the future if this becomes necessary  Relevant Orders    POCT urine dip (Completed)    Urge incontinence     With occasional leakage of urine, previously responded well to Enablex and requested a prescription for this today    I did  her on the use of anticholinergic medications in older patients who told her that should she have any confusion, unsteadiness on her feet, or any other untoward effects to please let me know and we will change her to [facility] Relevant Medications    darifenacin (ENABLEX) 15 MG 24 hr tablet            She will follow up in 6 months for assessment of urge urinary incontinence, if this has not improved with medication we will proceed to percutaneous tibial nerve stimulation    Assessment and plan:       Please see problem oriented charting for the assessment plan of today's urological complaints  -     Filemon Mueller MD      Chief Complaint     History of left pyelonephritis  Urge incontinence  Hydronephrosis      History of Present Illness     Shara Sosa is a 74 y  o  woman with a past medical history as listed below who was recently hospitalized for pyelonephritis in early May 2018  She was found to have mild left-sided hydronephrosis at that time without associated kidney stone but was told that she may have passed a kidney stone  She denies previous stone episodes and previous stone surgeries  At the time of her pyelonephritis she was having bilateral lower abdominal pain which radiated from the lower quadrants to the lower midline of the abdomen, she had no other associated symptoms apart from some left-sided flank pain at that time  There were no aggravating or alleviating factors  She has no flank pain, discomfort, malaise, or fever at this time  A follow-up ultrasound showed no hydronephrosis within her kidneys  I told the patient that this was likely due to hydronephrosis and she does not require long-term imaging follow-up  When asked about recurrent infections she states that she gets a urinary tract infection every few years and her last infection was 6 years ago prior to the recent bout of pyelonephritis  She states that she feels she got this infection because she has been looking after her  who recently passed away and was not collecting her own health      She also mentions a history of urge incontinence which previously responded well to Enablex and requested a prescription for this, I am happy to give her this but did tell her that these medications are occasionally associated with cognitive dysfunction in patients her age and she will look out for the symptoms  On review of systems today she denies dysuria,, hesitancy of urination, gross hematuria, urgency, she does have nocturia 1-2 times per night, she feels empty after voiding and stream quality is good  Urine dip analysis today is negative for leukocytes, nitrites, blood, protein, ketones, and glucose and is clear yellow    Review of systems otherwise negative  Detailed Urologic History     - please refer to HPI    Review of Systems     Review of Systems   Constitutional: Negative  HENT: Negative  Eyes: Negative  Respiratory: Negative  Cardiovascular: Negative  Gastrointestinal: Negative  Endocrine: Negative  Genitourinary:        Urge incontinence   Musculoskeletal: Negative  Skin: Negative  Allergic/Immunologic: Negative  Neurological: Negative  Hematological: Negative  Psychiatric/Behavioral: Negative  Allergies     Allergies   Allergen Reactions    Latex Rash    Penicillin G Rash       Physical Exam     Physical Exam   Constitutional: She is oriented to person, place, and time  She appears well-developed and well-nourished  No distress  HENT:   Head: Normocephalic and atraumatic  Right Ear: External ear normal    Left Ear: External ear normal    Nose: Nose normal    Mouth/Throat: Oropharynx is clear and moist  No oropharyngeal exudate  Eyes: Conjunctivae and EOM are normal  Pupils are equal, round, and reactive to light  Right eye exhibits no discharge  Left eye exhibits no discharge  No scleral icterus  Neck: Normal range of motion  Neck supple  No tracheal deviation present  Cardiovascular: Normal rate, regular rhythm and intact distal pulses  Pulmonary/Chest: Effort normal  No stridor  No respiratory distress  She has no wheezes  She exhibits no tenderness  Abdominal: Soft  She exhibits no distension and no mass  There is no tenderness  There is no rebound and no guarding  No hernia  No CVA tenderness   Musculoskeletal: Normal range of motion  She exhibits no edema, tenderness or deformity  Neurological: She is alert and oriented to person, place, and time  No cranial nerve deficit  Coordination normal    Skin: Skin is warm and dry  No rash noted  She is not diaphoretic  No erythema  No pallor  Skin changes associated with normal aging are present   Psychiatric: She has a normal mood and affect  Her behavior is normal  Judgment and thought content normal    Nursing note and vitals reviewed            Vital Signs  Vitals:    05/16/18 1443   BP: 136/90   BP Location: Left arm   Patient Position: Sitting   Cuff Size: Adult   Pulse: 74   Weight: 66 7 kg (147 lb)   Height: 5' 1" (1 549 m)         Current Medications       Current Outpatient Prescriptions:     aspirin (ECOTRIN LOW STRENGTH) 81 mg EC tablet, Take 1 tablet (81 mg total) by mouth daily for 90 days, Disp: 90 tablet, Rfl: 0    lisinopril (ZESTRIL) 10 mg tablet, Take 1 tablet (10 mg total) by mouth daily for 30 days, Disp: 30 tablet, Rfl: 2    metFORMIN (GLUCOPHAGE) 500 mg tablet, Take 1 tablet (500 mg total) by mouth 2 (two) times a day with meals for 30 days, Disp: 60 tablet, Rfl: 2    darifenacin (ENABLEX) 15 MG 24 hr tablet, Take 1 tablet (15 mg total) by mouth daily, Disp: 30 tablet, Rfl: 3      Active Problems     Patient Active Problem List   Diagnosis    Type 2 diabetes mellitus (HCC)    Transition of care performed with sharing of clinical summary    Essential hypertension    Other hydronephrosis    History of pyelonephritis    Urge incontinence         Past Medical History     Past Medical History:   Diagnosis Date    Diabetes mellitus ([facility] [address] )     Patient states "I quit my medication"    Eczema     Hypertension     Malignant neoplasm of skin     LAST ASSESSED: [id number]    Nonmelanoma skin cancer LAST ASSESSED: [id number]         Surgical History     Past Surgical History:   Procedure Laterality Date    EYE SURGERY      catarcts removal both    SKIN BIOPSY      face and arm         Family History     Family History   Problem Relation Age of Onset    Diabetes Mother     Skin cancer Mother          Social History     Social History     History   Smoking Status    Never Smoker   Smokeless Tobacco    Never Used         Pertinent Lab Values     Lab Results   Component Value Date    CREATININE 1 16 05/06/2018           Pertinent Imaging       The patient's images were reviewed by me personally and also in real time with them in the examination room using our PACS imaging system  The imaging findings are significant for very mild hydronephrosis and fullness of the left collecting system on the CT scan from earlier this month, a follow-up ultrasound showed no abnormalities

## 2018-05-16 NOTE — ASSESSMENT & PLAN NOTE
Patient was seen to have left-sided hydronephrosis associated with pyelonephritis, a follow-up ultrasound showed no hydronephrosis  Plan:  The patient does not require follow-up on a long-term basis unless this develops again in the future    If she develops recurrent pyelonephritis, we will explore the use of Premarin cream as she does endorse a history of vaginal dryness and discomfort

## 2018-05-16 NOTE — ASSESSMENT & PLAN NOTE
With occasional leakage of urine, previously responded well to Enablex and requested a prescription for this today    I did  her on the use of anticholinergic medications in older patients who told her that should she have any confusion, unsteadiness on her feet, or any other untoward effects to please let me know and we will change her to [facility]

## 2018-08-02 DIAGNOSIS — I10 ESSENTIAL HYPERTENSION: ICD-10-CM

## 2018-08-03 RX ORDER — LISINOPRIL 10 MG/1
TABLET ORAL
Qty: 30 TABLET | Refills: 2 | Status: SHIPPED | OUTPATIENT
Start: 2018-08-03 | End: 2018-08-08 | Stop reason: SDUPTHER

## 2018-08-08 ENCOUNTER — OFFICE VISIT (OUTPATIENT)
Dept: FAMILY MEDICINE CLINIC | Facility: CLINIC | Age: 73
End: 2018-08-08
Payer: MEDICARE

## 2018-08-08 VITALS
DIASTOLIC BLOOD PRESSURE: 88 MMHG | HEIGHT: 61 IN | WEIGHT: 143.13 LBS | SYSTOLIC BLOOD PRESSURE: 164 MMHG | HEART RATE: 81 BPM | BODY MASS INDEX: 27.02 KG/M2 | OXYGEN SATURATION: 98 % | RESPIRATION RATE: 18 BRPM

## 2018-08-08 DIAGNOSIS — E11.8 TYPE 2 DIABETES MELLITUS WITH COMPLICATION, WITHOUT LONG-TERM CURRENT USE OF INSULIN (HCC): ICD-10-CM

## 2018-08-08 DIAGNOSIS — N39.41 URGE INCONTINENCE: ICD-10-CM

## 2018-08-08 DIAGNOSIS — I10 ESSENTIAL HYPERTENSION: Primary | ICD-10-CM

## 2018-08-08 DIAGNOSIS — N18.30 CKD (CHRONIC KIDNEY DISEASE) STAGE 3, GFR 30-59 ML/MIN (HCC): ICD-10-CM

## 2018-08-08 DIAGNOSIS — E78.2 MIXED HYPERLIPIDEMIA: ICD-10-CM

## 2018-08-08 DIAGNOSIS — E11.9 TYPE 2 DIABETES MELLITUS WITHOUT COMPLICATION, WITHOUT LONG-TERM CURRENT USE OF INSULIN (HCC): ICD-10-CM

## 2018-08-08 PROBLEM — IMO0001 TRANSITION OF CARE PERFORMED WITH SHARING OF CLINICAL SUMMARY: Status: RESOLVED | Noted: 2018-05-09 | Resolved: 2018-08-08

## 2018-08-08 PROBLEM — Z87.448 HISTORY OF PYELONEPHRITIS: Status: RESOLVED | Noted: 2018-05-15 | Resolved: 2018-08-08

## 2018-08-08 PROBLEM — N13.39 OTHER HYDRONEPHROSIS: Status: RESOLVED | Noted: 2018-05-15 | Resolved: 2018-08-08

## 2018-08-08 PROBLEM — Z78.9 TRANSITION OF CARE PERFORMED WITH SHARING OF CLINICAL SUMMARY: Status: RESOLVED | Noted: 2018-05-09 | Resolved: 2018-08-08

## 2018-08-08 PROCEDURE — 99214 OFFICE O/P EST MOD 30 MIN: CPT | Performed by: NURSE PRACTITIONER

## 2018-08-08 RX ORDER — ATORVASTATIN CALCIUM 10 MG/1
10 TABLET, FILM COATED ORAL DAILY
Qty: 90 TABLET | Refills: 2 | Status: SHIPPED | OUTPATIENT
Start: 2018-08-08 | End: 2019-03-20 | Stop reason: SDUPTHER

## 2018-08-08 RX ORDER — LISINOPRIL 10 MG/1
10 TABLET ORAL DAILY
Qty: 90 TABLET | Refills: 2 | Status: SHIPPED | OUTPATIENT
Start: 2018-08-08 | End: 2019-06-06 | Stop reason: SDUPTHER

## 2018-08-08 RX ORDER — DARIFENACIN HYDROBROMIDE 15 MG/1
15 TABLET, EXTENDED RELEASE ORAL DAILY
Qty: 90 TABLET | Refills: 2 | Status: SHIPPED | OUTPATIENT
Start: 2018-08-08 | End: 2019-02-21 | Stop reason: SDUPTHER

## 2018-08-08 RX ORDER — AMLODIPINE BESYLATE 5 MG/1
5 TABLET ORAL DAILY
Qty: 90 TABLET | Refills: 1 | Status: SHIPPED | OUTPATIENT
Start: 2018-08-08 | End: 2019-02-06 | Stop reason: SDUPTHER

## 2018-08-08 NOTE — PROGRESS NOTES
Assessment/Plan:    No problem-specific Assessment & Plan notes found for this encounter  Diagnoses and all orders for this visit:    Essential hypertension  -     lisinopril (ZESTRIL) 10 mg tablet; Take 1 tablet (10 mg total) by mouth daily for 90 days  -     amLODIPine (NORVASC) 5 mg tablet; Take 1 tablet (5 mg total) by mouth daily for 90 days  -     Comprehensive metabolic panel; Future  -     Hemoglobin A1C; Future  -     Lipid panel; Future    Type 2 diabetes mellitus with complication, without long-term current use of insulin (HCC)  -     Comprehensive metabolic panel; Future  -     Hemoglobin A1C; Future  -     Lipid panel; Future  -     Microalbumin / creatinine urine ratio    Urge incontinence  -     darifenacin (ENABLEX) 15 MG 24 hr tablet; Take 1 tablet (15 mg total) by mouth daily for 90 days  -     Comprehensive metabolic panel; Future  -     Hemoglobin A1C; Future  -     Lipid panel; Future    CKD (chronic kidney disease) stage 3, GFR 30-59 ml/min  -     Comprehensive metabolic panel; Future  -     Hemoglobin A1C; Future  -     Lipid panel; Future  -     Microalbumin / creatinine urine ratio    Mixed hyperlipidemia  -     atorvastatin (LIPITOR) 10 mg tablet; Take 1 tablet (10 mg total) by mouth daily for 90 days  -     Comprehensive metabolic panel; Future  -     Hemoglobin A1C; Future  -     Lipid panel; Future    Type 2 diabetes mellitus without complication, without long-term current use of insulin (HCC)  -     metFORMIN (GLUCOPHAGE) 500 mg tablet; Take 1 tablet (500 mg total) by mouth 2 (two) times a day with meals for 90 days  -     Comprehensive metabolic panel; Future  -     Hemoglobin A1C; Future  -     Lipid panel; Future          Subjective:      Patient ID: Arsenio Liao is a 74 y o  female  Pt is here for follow up and review of labs  Abnormal labs are as follows: chol 240  ldl-133  Glu-144  Creat 1 33  gfr-39   a1c- 7 4    DM- improved from 8 2    Pt has been trying to keep her carbs less than 150 per day  Lost 12 pounds in 3 months  Pt believes the metformin is making her hair fall out    htn- not at goal  Pt reports that this am has been stressful   CKD- stage 3   Urge incontinence- medication is minimizing her symptoms        The following portions of the patient's history were reviewed and updated as appropriate:   She  has a past medical history of Diabetes mellitus ([facility] [address] ); Eczema; Hypertension; Malignant neoplasm of skin; and Nonmelanoma skin cancer  She   Patient Active Problem List    Diagnosis Date Noted    CKD (chronic kidney disease) stage 3, GFR 30-59 ml/min 08/08/2018    Essential hypertension 05/09/2018    Type 2 diabetes mellitus ([facility] [address] ) 05/02/2018     She  has a past surgical history that includes Eye surgery and Skin biopsy  Her family history includes Diabetes in her mother; Skin cancer in her mother  She  reports that she has never smoked  She has never used smokeless tobacco  She reports that she does not drink alcohol or use drugs  Current Outpatient Prescriptions   Medication Sig Dispense Refill    amLODIPine (NORVASC) 5 mg tablet Take 1 tablet (5 mg total) by mouth daily for 90 days 90 tablet 1    aspirin (ECOTRIN LOW STRENGTH) 81 mg EC tablet Take 1 tablet (81 mg total) by mouth daily for 90 days 90 tablet 0    atorvastatin (LIPITOR) 10 mg tablet Take 1 tablet (10 mg total) by mouth daily for 90 days 90 tablet 2    darifenacin (ENABLEX) 15 MG 24 hr tablet Take 1 tablet (15 mg total) by mouth daily for 90 days 90 tablet 2    lisinopril (ZESTRIL) 10 mg tablet Take 1 tablet (10 mg total) by mouth daily for 90 days 90 tablet 2    metFORMIN (GLUCOPHAGE) 500 mg tablet Take 1 tablet (500 mg total) by mouth 2 (two) times a day with meals for 90 days 180 tablet 2     No current facility-administered medications for this visit        Current Outpatient Prescriptions on File Prior to Visit   Medication Sig    aspirin (ECOTRIN LOW STRENGTH) 81 mg EC tablet Take 1 tablet (81 mg total) by mouth daily for 90 days    [DISCONTINUED] darifenacin (ENABLEX) 15 MG 24 hr tablet Take 1 tablet (15 mg total) by mouth daily    [DISCONTINUED] lisinopril (ZESTRIL) 10 mg tablet TAKE 1 TABLET BY MOUTH ONCE DAILY FOR  30  DAYS    [DISCONTINUED] metFORMIN (GLUCOPHAGE) 500 mg tablet Take 1 tablet (500 mg total) by mouth 2 (two) times a day with meals for 30 days     No current facility-administered medications on file prior to visit  She is allergic to latex and penicillin g     Review of Systems   Constitutional: Negative  Negative for fatigue, fever and unexpected weight change  HENT: Positive for rhinorrhea and sneezing  Eyes: Negative  Negative for visual disturbance  Respiratory: Negative for cough and wheezing  Cardiovascular: Negative  Negative for chest pain and leg swelling  Gastrointestinal: Negative  Negative for abdominal pain, diarrhea and nausea  Endocrine: Negative for polydipsia and polyuria  Genitourinary:        Urge incontinence   Musculoskeletal: Negative  Skin: Negative  Negative for rash and wound  Allergic/Immunologic: Negative for immunocompromised state  Neurological: Negative  Negative for weakness and numbness  Hematological: Negative for adenopathy  Psychiatric/Behavioral: Negative  Negative for confusion and decreased concentration  All other systems reviewed and are negative  Objective:      /88 (BP Location: Left arm, Patient Position: Sitting)   Pulse 81   Resp 18   Ht 5' 1" (1 549 m)   Wt 64 9 kg (143 lb 2 oz)   SpO2 98%   BMI 27 04 kg/m²          Physical Exam   Constitutional: She is oriented to person, place, and time  She appears well-developed and well-nourished  No distress  HENT:   Head: Normocephalic and atraumatic     Right Ear: External ear normal    Left Ear: External ear normal    Nose: Nose normal    Mouth/Throat: Oropharynx is clear and moist    Eyes: Conjunctivae and EOM are normal  Pupils are equal, round, and reactive to light  Right eye exhibits no discharge  Left eye exhibits no discharge  Neck: Normal range of motion  Neck supple  No thyromegaly present  Cardiovascular: Normal rate, regular rhythm and normal heart sounds  Exam reveals no gallop and no friction rub  No murmur heard  Pulmonary/Chest: Effort normal and breath sounds normal  No respiratory distress  She has no wheezes  Abdominal: Soft  Bowel sounds are normal  She exhibits no distension  There is no tenderness  Musculoskeletal: Normal range of motion  She exhibits no edema, tenderness or deformity  Lymphadenopathy:     She has no cervical adenopathy  Neurological: She is alert and oriented to person, place, and time  Skin: Skin is warm and dry  No rash noted  She is not diaphoretic  No pallor  Psychiatric: She has a normal mood and affect   Her behavior is normal  Judgment and thought content normal

## 2018-08-08 NOTE — PATIENT INSTRUCTIONS
htn-medication change as ordered  Low salt diet  HLD- not controlled  Start low dose statin therapy  Strive for 5 servings of fruits and veggies daily  DM- at this time, continue current medication  Monitor for next three months and if no impvmnt, then add medication  Hair loss- ? Due to metformin  Start Biotin  CKD stage 3 (chronic kidney disease)- avoid Advil or Ibuprofen   Only use Tylenol for pain if needed  Follow up in 3 months

## 2018-11-05 LAB — HBA1C MFR BLD HPLC: 6.9 %

## 2018-11-20 ENCOUNTER — OFFICE VISIT (OUTPATIENT)
Dept: FAMILY MEDICINE CLINIC | Facility: CLINIC | Age: 73
End: 2018-11-20
Payer: MEDICARE

## 2018-11-20 VITALS
HEIGHT: 61 IN | OXYGEN SATURATION: 97 % | SYSTOLIC BLOOD PRESSURE: 128 MMHG | BODY MASS INDEX: 25.3 KG/M2 | HEART RATE: 80 BPM | WEIGHT: 134 LBS | DIASTOLIC BLOOD PRESSURE: 88 MMHG | RESPIRATION RATE: 18 BRPM

## 2018-11-20 DIAGNOSIS — H10.13 ALLERGIC CONJUNCTIVITIS OF BOTH EYES: ICD-10-CM

## 2018-11-20 DIAGNOSIS — E11.8 TYPE 2 DIABETES MELLITUS WITH COMPLICATION, WITHOUT LONG-TERM CURRENT USE OF INSULIN (HCC): ICD-10-CM

## 2018-11-20 DIAGNOSIS — Z28.21 INFLUENZA VACCINATION DECLINED BY PATIENT: ICD-10-CM

## 2018-11-20 DIAGNOSIS — J30.2 SEASONAL ALLERGIES: ICD-10-CM

## 2018-11-20 DIAGNOSIS — I10 ESSENTIAL HYPERTENSION: Primary | ICD-10-CM

## 2018-11-20 DIAGNOSIS — N18.30 CKD (CHRONIC KIDNEY DISEASE) STAGE 3, GFR 30-59 ML/MIN (HCC): ICD-10-CM

## 2018-11-20 DIAGNOSIS — D50.9 MICROCYTIC ANEMIA: ICD-10-CM

## 2018-11-20 DIAGNOSIS — E78.2 HYPERLIPIDEMIA, MIXED: ICD-10-CM

## 2018-11-20 PROCEDURE — 99214 OFFICE O/P EST MOD 30 MIN: CPT | Performed by: NURSE PRACTITIONER

## 2018-11-20 RX ORDER — OLOPATADINE HYDROCHLORIDE 1 MG/ML
1 SOLUTION/ DROPS OPHTHALMIC 2 TIMES DAILY
Qty: 5 ML | Refills: 2 | Status: SHIPPED | OUTPATIENT
Start: 2018-11-20 | End: 2019-10-03 | Stop reason: SDUPTHER

## 2018-11-20 RX ORDER — LEVOCETIRIZINE DIHYDROCHLORIDE 5 MG/1
5 TABLET, FILM COATED ORAL EVERY EVENING
Qty: 90 TABLET | Refills: 1 | Status: SHIPPED | OUTPATIENT
Start: 2018-11-20 | End: 2019-03-20

## 2018-11-20 NOTE — PROGRESS NOTES
Assessment/Plan:    No problem-specific Assessment & Plan notes found for this encounter  Diagnoses and all orders for this visit:    Essential hypertension  -     Comprehensive metabolic panel; Future  -     Hemoglobin A1C; Future  -     Lipid panel; Future    Type 2 diabetes mellitus with complication, without long-term current use of insulin (HCC)  -     Comprehensive metabolic panel; Future  -     Hemoglobin A1C; Future  -     Lipid panel; Future  -     Microalbumin / creatinine urine ratio    CKD (chronic kidney disease) stage 3, GFR 30-59 ml/min (HCC)  -     Comprehensive metabolic panel; Future  -     Hemoglobin A1C; Future  -     Lipid panel; Future    Hyperlipidemia, mixed  -     Comprehensive metabolic panel; Future  -     Hemoglobin A1C; Future  -     Lipid panel; Future    Microcytic anemia  -     CBC and differential; Future    Influenza vaccination declined by patient    Seasonal allergies  -     levocetirizine (XYZAL) 5 MG tablet; Take 1 tablet (5 mg total) by mouth every evening for 90 days    Allergic conjunctivitis of both eyes  -     olopatadine (PATANOL) 0 1 % ophthalmic solution; Administer 1 drop to both eyes 2 (two) times a day for 5 days          Subjective:      Patient ID: Judy Dougherty is a 74 y o  female  Pt is here for follow up and review of labs    :Abs are as follows: glu 122, creat 1 05  gfr- 52  A1C 6 9  Chol 174  tg 113  hdl- 93  ldl- 81  DM- imprved a1c from 7 4 to 6 9  Hyperlipidemia- lost weight and changed diet  Very big imprvmtn in her lipid panel   htn-stable  Seasonal allergies- exacerbated at this time  She lost ten pounds since her last visit  She had changed her diet by cutting out dairy  Mammogram- declined          The following portions of the patient's history were reviewed and updated as appropriate:   She  has a past medical history of Diabetes mellitus ([address] ); Eczema; Hypertension; Malignant neoplasm of skin; and Nonmelanoma skin cancer    She   Patient Active Problem List    Diagnosis Date Noted    Hyperlipidemia, mixed 11/20/2018    Microcytic anemia 11/20/2018    Influenza vaccination declined by patient 11/20/2018    Seasonal allergies 11/20/2018    Allergic conjunctivitis of both eyes 11/20/2018    CKD (chronic kidney disease) stage 3, GFR 30-59 ml/min ([facility] ) 08/08/2018    Essential hypertension 05/09/2018    Type 2 diabetes mellitus ([facility] ) 05/02/2018     She  has a past surgical history that includes Eye surgery and Skin biopsy  Her family history includes Diabetes in her mother; Skin cancer in her mother  She  reports that she has never smoked  She has never used smokeless tobacco  She reports that she does not drink alcohol or use drugs  Current Outpatient Prescriptions   Medication Sig Dispense Refill    amLODIPine (NORVASC) 5 mg tablet Take 1 tablet (5 mg total) by mouth daily for 90 days 90 tablet 1    aspirin (ECOTRIN LOW STRENGTH) 81 mg EC tablet Take 1 tablet (81 mg total) by mouth daily for 90 days 90 tablet 0    atorvastatin (LIPITOR) 10 mg tablet Take 1 tablet (10 mg total) by mouth daily for 90 days 90 tablet 2    darifenacin (ENABLEX) 15 MG 24 hr tablet Take 1 tablet (15 mg total) by mouth daily for 90 days 90 tablet 2    levocetirizine (XYZAL) 5 MG tablet Take 1 tablet (5 mg total) by mouth every evening for 90 days 90 tablet 1    lisinopril (ZESTRIL) 10 mg tablet Take 1 tablet (10 mg total) by mouth daily for 90 days 90 tablet 2    metFORMIN (GLUCOPHAGE) 500 mg tablet Take 1 tablet (500 mg total) by mouth 2 (two) times a day with meals for 90 days 180 tablet 2    olopatadine (PATANOL) 0 1 % ophthalmic solution Administer 1 drop to both eyes 2 (two) times a day for 5 days 5 mL 2     No current facility-administered medications for this visit        Current Outpatient Prescriptions on File Prior to Visit   Medication Sig    amLODIPine (NORVASC) 5 mg tablet Take 1 tablet (5 mg total) by mouth daily for 90 days    aspirin (ECOTRIN LOW STRENGTH) 81 mg EC tablet Take 1 tablet (81 mg total) by mouth daily for 90 days    atorvastatin (LIPITOR) 10 mg tablet Take 1 tablet (10 mg total) by mouth daily for 90 days    darifenacin (ENABLEX) 15 MG 24 hr tablet Take 1 tablet (15 mg total) by mouth daily for 90 days    lisinopril (ZESTRIL) 10 mg tablet Take 1 tablet (10 mg total) by mouth daily for 90 days    metFORMIN (GLUCOPHAGE) 500 mg tablet Take 1 tablet (500 mg total) by mouth 2 (two) times a day with meals for 90 days     No current facility-administered medications on file prior to visit  She is allergic to latex and penicillin g     Review of Systems   Constitutional: Negative  Negative for fatigue, fever and unexpected weight change  HENT: Positive for sneezing  Negative for congestion  Eyes: Positive for itching  Negative for visual disturbance  Respiratory: Negative for cough, shortness of breath and wheezing  Cardiovascular: Negative  Gastrointestinal: Negative  Negative for abdominal pain, diarrhea, nausea and vomiting  Endocrine: Negative for polydipsia, polyphagia and polyuria  Musculoskeletal: Negative  Skin: Negative  Negative for rash  Allergic/Immunologic: Positive for environmental allergies  Negative for immunocompromised state  Neurological: Negative  Negative for dizziness, light-headedness and headaches  Hematological: Negative for adenopathy  Psychiatric/Behavioral: Negative  All other systems reviewed and are negative  Objective:      /88 (BP Location: Left arm, Patient Position: Sitting)   Pulse 80   Resp 18   Ht 5' 1" (1 549 m)   Wt 60 8 kg (134 lb)   SpO2 97%   BMI 25 32 kg/m²          Physical Exam   Constitutional: She is oriented to person, place, and time  She appears well-developed and well-nourished  No distress  HENT:   Head: Normocephalic and atraumatic     Right Ear: External ear normal    Left Ear: External ear normal    Nose: Nose normal  Mouth/Throat: Oropharynx is clear and moist    Eyes: Pupils are equal, round, and reactive to light  Conjunctivae are normal  No scleral icterus  Neck: Normal range of motion  Neck supple  No thyromegaly present  Cardiovascular: Normal rate, regular rhythm, normal heart sounds and intact distal pulses  Exam reveals no gallop and no friction rub  No murmur heard  Pulmonary/Chest: Effort normal and breath sounds normal  No respiratory distress  She has no wheezes  Abdominal: Soft  Bowel sounds are normal  She exhibits no distension  There is no tenderness  Musculoskeletal: Normal range of motion  She exhibits no edema, tenderness or deformity  Lymphadenopathy:     She has no cervical adenopathy  Neurological: She is alert and oriented to person, place, and time  Skin: Skin is warm and dry  No rash noted  She is not diaphoretic  No pallor  Psychiatric: She has a normal mood and affect   Her behavior is normal  Judgment and thought content normal

## 2018-11-20 NOTE — PATIENT INSTRUCTIONS
Hypertension- stable  Hyperlipidemia- lipid panel is MUCH improved  Continue current dietary changes  CKD 3- stable Sees Nephrology  DM- A1C is much imprved through her dietary changes  Allergic conjunctivitis- start eye drops as ordered  Treat seasonal allergy symptoms with Xyzal  Follow up in 4 months  Obtain labs one week prior to visit

## 2018-11-26 ENCOUNTER — OFFICE VISIT (OUTPATIENT)
Dept: UROLOGY | Facility: CLINIC | Age: 73
End: 2018-11-26
Payer: MEDICARE

## 2018-11-26 VITALS
DIASTOLIC BLOOD PRESSURE: 82 MMHG | SYSTOLIC BLOOD PRESSURE: 130 MMHG | HEART RATE: 77 BPM | HEIGHT: 61 IN | BODY MASS INDEX: 24.92 KG/M2 | WEIGHT: 132 LBS

## 2018-11-26 DIAGNOSIS — N12 PYELONEPHRITIS: Primary | ICD-10-CM

## 2018-11-26 LAB — POST-VOID RESIDUAL VOLUME, ML POC: 37 ML

## 2018-11-26 PROCEDURE — 51798 US URINE CAPACITY MEASURE: CPT | Performed by: PHYSICIAN ASSISTANT

## 2018-11-26 PROCEDURE — 99213 OFFICE O/P EST LOW 20 MIN: CPT | Performed by: PHYSICIAN ASSISTANT

## 2018-11-26 NOTE — PROGRESS NOTES
1  Pyelonephritis  POCT Measure PVR         Assessment and plan:       1  Urinary urgency and frequency - managed by Dr Lissette De La Rosa  - well controlled on Enablex 15 mg daily  Patient denies any adverse reactions  Follow-up 1 year PVR for symptom reassessment  2    History pyelonephritis  - patient has been urinary infection free over the past 6 months  I would not recommend any prophylactic antibiotics at this point  We discussed signs and symptoms of urinary infection  She is aware to contact us should she demonstrating signs of urinary infection in which a urinalysis with microscopy and urine culture will be obtained  - we reviewed proper hydration, avoidance of constipation, and timed voiding in order to minimize urinary infections  All questions answered  Juan Barakat PA-C      Chief Complaint     Chief Complaint   Patient presents with    Left Pyelonephritis    Urinary Incontinence         History of Present Illness     Sawyer Bang is a 74 y o  female patient of Dr Lissette De La Rosa with a history pyelonephritis presenting for follow-up  Patient was in the hospital in May 2018 for pyelonephritis  She was noted to have mild left hydronephrosis at that time without evidence of an obstructing stone  Patient does have a history of kidney stones however  A repeat ultrasound the kidney and bladder was performed shortly thereafter which revealed resolution of hydronephrosis  Patient denies any urinary infections over the past 6 months  She denies any dysuria, gross hematuria, suprapubic pressure, flank pain, fevers, or chills  Patient has a history of urinary urgency and frequency  She has been managed on Enablex 15mg daily  Patient states that this has excellent control over her urinary urgency and frequency  She no longer requires the use of pads  She denies any dry mouth, constipation or mental status change      Patient is demonstrating excellent bladder emptying in the office today with a postvoid residual 37 mL  Laboratory     Lab Results   Component Value Date    CREATININE 1 16 05/06/2018       Review of Systems     Review of Systems   Constitutional: Negative for activity change, appetite change, chills, diaphoresis, fatigue, fever and unexpected weight change  Respiratory: Negative for chest tightness and shortness of breath  Cardiovascular: Negative for chest pain, palpitations and leg swelling  Gastrointestinal: Negative for abdominal distention, abdominal pain, constipation, diarrhea, nausea and vomiting  Genitourinary: Negative for decreased urine volume, difficulty urinating, dysuria, enuresis, flank pain, frequency, genital sores, hematuria and urgency  Musculoskeletal: Negative for back pain, gait problem and myalgias  Skin: Negative for color change, pallor, rash and wound  Psychiatric/Behavioral: Negative for behavioral problems  The patient is not nervous/anxious  Allergies     Allergies   Allergen Reactions    Latex Rash    Penicillin G Rash       Physical Exam     Physical Exam   Constitutional: She is oriented to person, place, and time  She appears well-developed and well-nourished  No distress  HENT:   Head: Normocephalic and atraumatic  Eyes: Conjunctivae are normal    Neck: Normal range of motion  No tracheal deviation present  Pulmonary/Chest: Effort normal    Musculoskeletal: Normal range of motion  She exhibits no edema or deformity  Neurological: She is alert and oriented to person, place, and time  Skin: Skin is warm and dry  She is not diaphoretic  No erythema  No pallor  Psychiatric: She has a normal mood and affect   Her behavior is normal          Vital Signs     Vitals:    11/26/18 1247   BP: 130/82   BP Location: Left arm   Patient Position: Sitting   Cuff Size: Large   Pulse: 77   Weight: 59 9 kg (132 lb)   Height: 5' 1" (1 549 m)         Current Medications       Current Outpatient Prescriptions:     amLODIPine (NORVASC) 5 mg tablet, Take 1 tablet (5 mg total) by mouth daily for 90 days, Disp: 90 tablet, Rfl: 1    aspirin (ECOTRIN LOW STRENGTH) 81 mg EC tablet, Take 1 tablet (81 mg total) by mouth daily for 90 days, Disp: 90 tablet, Rfl: 0    atorvastatin (LIPITOR) 10 mg tablet, Take 1 tablet (10 mg total) by mouth daily for 90 days, Disp: 90 tablet, Rfl: 2    darifenacin (ENABLEX) 15 MG 24 hr tablet, Take 1 tablet (15 mg total) by mouth daily for 90 days, Disp: 90 tablet, Rfl: 2    levocetirizine (XYZAL) 5 MG tablet, Take 1 tablet (5 mg total) by mouth every evening for 90 days, Disp: 90 tablet, Rfl: 1    lisinopril (ZESTRIL) 10 mg tablet, Take 1 tablet (10 mg total) by mouth daily for 90 days, Disp: 90 tablet, Rfl: 2    metFORMIN (GLUCOPHAGE) 500 mg tablet, Take 1 tablet (500 mg total) by mouth 2 (two) times a day with meals for 90 days, Disp: 180 tablet, Rfl: 2    olopatadine (PATANOL) 0 1 % ophthalmic solution, Administer 1 drop to both eyes 2 (two) times a day for 5 days, Disp: 5 mL, Rfl: 2      Active Problems     Patient Active Problem List   Diagnosis    Type 2 diabetes mellitus (HCC)    Essential hypertension    CKD (chronic kidney disease) stage 3, GFR 30-59 ml/min (HCC)    Hyperlipidemia, mixed    Microcytic anemia    Influenza vaccination declined by patient    Seasonal allergies    Allergic conjunctivitis of both eyes       Past Medical History     Past Medical History:   Diagnosis Date    Diabetes mellitus ([facility] [address] )     Patient states "I quit my medication"    Eczema     Hypertension     Malignant neoplasm of skin     LAST ASSESSED: [id number]    Nonmelanoma skin cancer     LAST ASSESSED: [id number]       Surgical History     Past Surgical History:   Procedure Laterality Date    EYE SURGERY      catarcts removal both    SKIN BIOPSY      face and arm       Family History     Family History   Problem Relation Age of Onset    Diabetes Mother     Skin cancer Mother        Social History Social History       Radiology

## 2019-02-05 DIAGNOSIS — E11.9 TYPE 2 DIABETES MELLITUS WITHOUT COMPLICATION, WITHOUT LONG-TERM CURRENT USE OF INSULIN (HCC): ICD-10-CM

## 2019-02-06 DIAGNOSIS — I10 ESSENTIAL HYPERTENSION: ICD-10-CM

## 2019-02-06 RX ORDER — AMLODIPINE BESYLATE 5 MG/1
5 TABLET ORAL DAILY
Qty: 90 TABLET | Refills: 1 | Status: SHIPPED | OUTPATIENT
Start: 2019-02-06 | End: 2019-10-03 | Stop reason: SDUPTHER

## 2019-02-21 DIAGNOSIS — N39.41 URGE INCONTINENCE: ICD-10-CM

## 2019-02-21 RX ORDER — DARIFENACIN HYDROBROMIDE 15 MG/1
15 TABLET, EXTENDED RELEASE ORAL DAILY
Qty: 90 TABLET | Refills: 2 | Status: SHIPPED | OUTPATIENT
Start: 2019-02-21 | End: 2019-03-26

## 2019-03-12 LAB — HBA1C MFR BLD HPLC: 6.7 %

## 2019-03-19 NOTE — PROGRESS NOTES
Assessment/Plan:       Diagnoses and all orders for this visit:    Essential hypertension  -     CBC and differential; Future  -     Comprehensive metabolic panel; Future  -     Hemoglobin A1C; Future  -     Lipid panel; Future    Type 2 diabetes mellitus with complication, without long-term current use of insulin (HCC)  -     CBC and differential; Future  -     Comprehensive metabolic panel; Future  -     Hemoglobin A1C; Future  -     Lipid panel; Future  -     Microalbumin / creatinine urine ratio    CKD (chronic kidney disease) stage 3, GFR 30-59 ml/min (HCC)  -     CBC and differential; Future  -     Comprehensive metabolic panel; Future  -     Hemoglobin A1C; Future  -     Lipid panel; Future  -     Microalbumin / creatinine urine ratio    Hyperlipidemia, mixed  -     CBC and differential; Future  -     Comprehensive metabolic panel; Future  -     Hemoglobin A1C; Future  -     Lipid panel; Future    Microcytic anemia  -     CBC and differential; Future  -     Comprehensive metabolic panel; Future  -     Hemoglobin A1C; Future  -     Lipid panel; Future    Seasonal allergies  -     CBC and differential; Future  -     Comprehensive metabolic panel; Future  -     Hemoglobin A1C; Future  -     Lipid panel; Future    Mixed hyperlipidemia  -     atorvastatin (LIPITOR) 10 mg tablet; Take 1 tablet (10 mg total) by mouth daily for 90 days        No problem-specific Assessment & Plan notes found for this encounter  Subjective:      Patient ID: Judy Dougherty is a 74 y o  female  Patient is here for follow up and review of labs  Hypertension-stable  DM-A1C is stable  Was 6 9 and is now 6 7  Hyperlipidemia-no lipid panel was performed  Anemia- CBC is wnl  She recently moved into her daughters home  She says diet is not as good as it should be since she moved  She has less exercise because she is not using the stairs, like she did at her apartment        109 (H)   122 (H)   144 (H)    16   17   27 (H)    1 13 (H)   1 05   1 33 (H)    139   142   140    4 7   4 8   4 7    106   107   107    25   26   26    9 7   9 5   9 6    93   86   91    3 9   3 8   3 8    0 4   0 5   0 4    7 3   7 3   7 6    10   9   16    22   20   27    8   9   7    48 (L)   52 (L)   39 (L)      6 7 (H)   6 9 (H)   7 4 (H)          The following portions of the patient's history were reviewed and updated as appropriate:   She has a past medical history of Diabetes mellitus ([address] ), Eczema, Hypertension, Malignant neoplasm of skin, and Nonmelanoma skin cancer  ,  does not have any pertinent problems on file  ,   has a past surgical history that includes Eye surgery and Skin biopsy  ,  family history includes Diabetes in her mother; Skin cancer in her mother  ,   reports that she has never smoked  She has never used smokeless tobacco  She reports that she does not drink alcohol or use drugs  ,  is allergic to latex and penicillin g   Current Outpatient Medications   Medication Sig Dispense Refill    amLODIPine (NORVASC) 5 mg tablet Take 1 tablet (5 mg total) by mouth daily for 180 days 90 tablet 1    aspirin (ECOTRIN LOW STRENGTH) 81 mg EC tablet Take 1 tablet (81 mg total) by mouth daily for 90 days 90 tablet 0    atorvastatin (LIPITOR) 10 mg tablet Take 1 tablet (10 mg total) by mouth daily for 90 days 90 tablet 2    darifenacin (ENABLEX) 15 MG 24 hr tablet Take 1 tablet (15 mg total) by mouth daily for 90 days 90 tablet 2    lisinopril (ZESTRIL) 10 mg tablet Take 1 tablet (10 mg total) by mouth daily for 90 days 90 tablet 2    metFORMIN (GLUCOPHAGE) 500 mg tablet Take 1 tablet (500 mg total) by mouth 2 (two) times a day with meals for 90 days 180 tablet 2    olopatadine (PATANOL) 0 1 % ophthalmic solution Administer 1 drop to both eyes 2 (two) times a day for 5 days 5 mL 2     No current facility-administered medications for this visit  Review of Systems   Constitutional: Negative  Negative for fatigue and fever  HENT: Negative  Negative for congestion  Eyes: Negative  Negative for visual disturbance  Respiratory: Negative for cough, chest tightness, shortness of breath and wheezing  Cardiovascular: Negative  Gastrointestinal: Negative  Negative for abdominal pain, blood in stool, diarrhea and nausea  Endocrine: Negative for polydipsia, polyphagia and polyuria  Genitourinary: Negative for difficulty urinating and flank pain  Musculoskeletal: Negative  Negative for arthralgias, back pain and myalgias  Skin: Negative  Negative for color change, pallor and rash  Allergic/Immunologic: Negative for immunocompromised state  Neurological: Negative  Negative for dizziness, weakness, light-headedness, numbness and headaches  Hematological: Negative for adenopathy  Psychiatric/Behavioral: Negative  Negative for confusion, decreased concentration and sleep disturbance  All other systems reviewed and are negative  Objective:  Vitals:    03/20/19 1309   BP: 128/80   BP Location: Left arm   Patient Position: Sitting   Pulse: 70   Resp: 18   SpO2: 96%   Weight: 60 8 kg (134 lb)   Height: 5' 1" (1 549 m)     Body mass index is 25 32 kg/m²  Physical Exam   Constitutional: She is oriented to person, place, and time  She appears well-developed and well-nourished  No distress  HENT:   Head: Normocephalic and atraumatic  Nose: Nose normal    Mouth/Throat: No oropharyngeal exudate  Eyes: Pupils are equal, round, and reactive to light  Conjunctivae are normal  No scleral icterus  Neck: Normal range of motion  Neck supple  No JVD present  Cardiovascular: Normal rate, regular rhythm, normal heart sounds and intact distal pulses  Exam reveals no gallop and no friction rub  No murmur heard  Pulses:       Dorsalis pedis pulses are 2+ on the right side, and 2+ on the left side  Posterior tibial pulses are 2+ on the right side, and 2+ on the left side     Pulmonary/Chest: Effort normal and breath sounds normal  No respiratory distress  Abdominal: Soft  Musculoskeletal: Normal range of motion  She exhibits no edema  Feet:   Right Foot:   Skin Integrity: Positive for warmth, callus and dry skin  Negative for ulcer or skin breakdown  Left Foot:   Skin Integrity: Positive for warmth, callus and dry skin  Negative for ulcer, skin breakdown or erythema  Lymphadenopathy:     She has no cervical adenopathy  Neurological: She is alert and oriented to person, place, and time  Coordination normal    Skin: Skin is warm and dry  Capillary refill takes less than 2 seconds  No rash noted  She is not diaphoretic  Psychiatric: She has a normal mood and affect  Her behavior is normal  Judgment and thought content normal    Nursing note and vitals reviewed  Diabetic Foot Exam    Patient's shoes and socks removed  Right Foot/Ankle   Right Foot Inspection  Skin Exam: skin normal, skin intact, dry skin, warmth, callus and callus no maceration, no abnormal color, no pre-ulcer and no ulcer                          Toe Exam: ROM and strength within normal limitsno swelling, no tenderness, erythema and  no right toe deformity  Sensory   Vibration: intact  Proprioception: intact   Monofilament testing: intact  Vascular  Capillary refills: < 3 seconds  The right DP pulse is 2+  The right PT pulse is 2+  Right Toe  - Comprehensive Exam  Ecchymosis: none  Arch: normal  Hammertoes: absent  Claw Toes: absent  Swelling: none   Tenderness: none         Left Foot/Ankle  Left Foot Inspection  Skin Exam: skin normal, skin intact, dry skin, warmth and callusno erythema, no maceration, normal color, no pre-ulcer and no ulcer                         Toe Exam: ROM and strength within normal limitsno swelling, no tenderness and no erythema                   Sensory   Vibration: intact  Proprioception: intact  Monofilament: intact  Vascular  Capillary refills: < 3 seconds  The left DP pulse is 2+  The left PT pulse is 2+     Left Toe  - Comprehensive Exam  Ecchymosis: none  Arch: normal  Hammertoes: absent  Claw toes: absent  Swelling: none   Tenderness: none       Assign Risk Category:  No deformity present;  No loss of protective sensation;        Risk: 0

## 2019-03-19 NOTE — PATIENT INSTRUCTIONS
Hypertension-stable  Advised low salt diet  DM-A1C is decreasing with each visit  Continue to improve diet  Find ways to exercise  Hyperlipidemia-no lipid panel was performed  Anemia- CBC is wnl  Calluses on feet- soak in warm water and try pumice stone    Follow up in 4 months and obtain labs prior to visit

## 2019-03-20 ENCOUNTER — OFFICE VISIT (OUTPATIENT)
Dept: FAMILY MEDICINE CLINIC | Facility: CLINIC | Age: 74
End: 2019-03-20
Payer: MEDICARE

## 2019-03-20 VITALS
RESPIRATION RATE: 18 BRPM | DIASTOLIC BLOOD PRESSURE: 80 MMHG | BODY MASS INDEX: 25.3 KG/M2 | OXYGEN SATURATION: 96 % | WEIGHT: 134 LBS | HEIGHT: 61 IN | SYSTOLIC BLOOD PRESSURE: 128 MMHG | HEART RATE: 70 BPM

## 2019-03-20 DIAGNOSIS — E78.2 HYPERLIPIDEMIA, MIXED: ICD-10-CM

## 2019-03-20 DIAGNOSIS — E78.2 MIXED HYPERLIPIDEMIA: ICD-10-CM

## 2019-03-20 DIAGNOSIS — N18.30 CKD (CHRONIC KIDNEY DISEASE) STAGE 3, GFR 30-59 ML/MIN (HCC): ICD-10-CM

## 2019-03-20 DIAGNOSIS — I10 ESSENTIAL HYPERTENSION: Primary | ICD-10-CM

## 2019-03-20 DIAGNOSIS — J30.2 SEASONAL ALLERGIES: ICD-10-CM

## 2019-03-20 DIAGNOSIS — E11.8 TYPE 2 DIABETES MELLITUS WITH COMPLICATION, WITHOUT LONG-TERM CURRENT USE OF INSULIN (HCC): ICD-10-CM

## 2019-03-20 DIAGNOSIS — D50.9 MICROCYTIC ANEMIA: ICD-10-CM

## 2019-03-20 PROCEDURE — 99214 OFFICE O/P EST MOD 30 MIN: CPT | Performed by: NURSE PRACTITIONER

## 2019-03-20 RX ORDER — ATORVASTATIN CALCIUM 10 MG/1
10 TABLET, FILM COATED ORAL DAILY
Qty: 90 TABLET | Refills: 2 | Status: SHIPPED | OUTPATIENT
Start: 2019-03-20 | End: 2019-10-03 | Stop reason: SDUPTHER

## 2019-03-26 ENCOUNTER — TELEPHONE (OUTPATIENT)
Dept: FAMILY MEDICINE CLINIC | Facility: CLINIC | Age: 74
End: 2019-03-26

## 2019-03-26 DIAGNOSIS — N32.81 OAB (OVERACTIVE BLADDER): Primary | ICD-10-CM

## 2019-03-26 RX ORDER — OXYBUTYNIN CHLORIDE 5 MG/1
5 TABLET ORAL 2 TIMES DAILY
Qty: 60 TABLET | Refills: 5 | Status: SHIPPED | OUTPATIENT
Start: 2019-03-26 | End: 2019-06-25 | Stop reason: SDUPTHER

## 2019-05-07 DIAGNOSIS — I10 ESSENTIAL HYPERTENSION: ICD-10-CM

## 2019-05-07 RX ORDER — AMLODIPINE BESYLATE 5 MG/1
TABLET ORAL
Qty: 90 TABLET | Refills: 1 | Status: SHIPPED | OUTPATIENT
Start: 2019-05-07 | End: 2019-05-25 | Stop reason: HOSPADM

## 2019-05-23 ENCOUNTER — APPOINTMENT (EMERGENCY)
Dept: RADIOLOGY | Facility: HOSPITAL | Age: 74
DRG: 313 | End: 2019-05-23
Payer: MEDICARE

## 2019-05-23 ENCOUNTER — HOSPITAL ENCOUNTER (INPATIENT)
Facility: HOSPITAL | Age: 74
LOS: 2 days | Discharge: HOME | DRG: 313 | End: 2019-05-25
Attending: EMERGENCY MEDICINE | Admitting: INTERNAL MEDICINE
Payer: MEDICARE

## 2019-05-23 DIAGNOSIS — R07.9 CHEST PAIN: ICD-10-CM

## 2019-05-23 DIAGNOSIS — E11.8 TYPE 2 DIABETES MELLITUS WITH COMPLICATION, WITHOUT LONG-TERM CURRENT USE OF INSULIN (HCC): ICD-10-CM

## 2019-05-23 DIAGNOSIS — N12 PYELONEPHRITIS: Primary | ICD-10-CM

## 2019-05-23 DIAGNOSIS — N18.30 CKD (CHRONIC KIDNEY DISEASE) STAGE 3, GFR 30-59 ML/MIN (HCC): ICD-10-CM

## 2019-05-23 LAB
ALBUMIN SERPL BCP-MCNC: 4 G/DL (ref 3.5–5)
ALP SERPL-CCNC: 123 U/L (ref 46–116)
ALT SERPL W P-5'-P-CCNC: 38 U/L (ref 12–78)
ANION GAP SERPL CALCULATED.3IONS-SCNC: 9 MMOL/L (ref 4–13)
APTT PPP: 26 SECONDS (ref 26–38)
AST SERPL W P-5'-P-CCNC: 27 U/L (ref 5–45)
ATRIAL RATE: 89 BPM
BACTERIA UR QL AUTO: ABNORMAL /HPF
BASOPHILS # BLD AUTO: 0.08 THOUSANDS/ΜL (ref 0–0.1)
BASOPHILS NFR BLD AUTO: 1 % (ref 0–1)
BILIRUB SERPL-MCNC: 0.5 MG/DL (ref 0.2–1)
BILIRUB UR QL STRIP: NEGATIVE
BUN SERPL-MCNC: 13 MG/DL (ref 5–25)
CALCIUM SERPL-MCNC: 10.4 MG/DL (ref 8.3–10.1)
CHLORIDE SERPL-SCNC: 101 MMOL/L (ref 100–108)
CLARITY UR: CLEAR
CO2 SERPL-SCNC: 29 MMOL/L (ref 21–32)
COLOR UR: YELLOW
CREAT SERPL-MCNC: 1.15 MG/DL (ref 0.6–1.3)
EOSINOPHIL # BLD AUTO: 0.51 THOUSAND/ΜL (ref 0–0.61)
EOSINOPHIL NFR BLD AUTO: 6 % (ref 0–6)
ERYTHROCYTE [DISTWIDTH] IN BLOOD BY AUTOMATED COUNT: 13.2 % (ref 11.6–15.1)
GFR SERPL CREATININE-BSD FRML MDRD: 47 ML/MIN/1.73SQ M
GLUCOSE SERPL-MCNC: 135 MG/DL (ref 65–140)
GLUCOSE SERPL-MCNC: 139 MG/DL (ref 65–140)
GLUCOSE UR STRIP-MCNC: NEGATIVE MG/DL
HCT VFR BLD AUTO: 42.8 % (ref 34.8–46.1)
HGB BLD-MCNC: 14 G/DL (ref 11.5–15.4)
HGB UR QL STRIP.AUTO: NEGATIVE
IMM GRANULOCYTES # BLD AUTO: 0.03 THOUSAND/UL (ref 0–0.2)
IMM GRANULOCYTES NFR BLD AUTO: 0 % (ref 0–2)
INR PPP: 0.92 (ref 0.86–1.17)
KETONES UR STRIP-MCNC: NEGATIVE MG/DL
LACTATE SERPL-SCNC: 1 MMOL/L (ref 0.5–2)
LACTATE SERPL-SCNC: 1.4 MMOL/L (ref 0.5–2)
LEUKOCYTE ESTERASE UR QL STRIP: ABNORMAL
LYMPHOCYTES # BLD AUTO: 1.22 THOUSANDS/ΜL (ref 0.6–4.47)
LYMPHOCYTES NFR BLD AUTO: 13 % (ref 14–44)
MCH RBC QN AUTO: 31.9 PG (ref 26.8–34.3)
MCHC RBC AUTO-ENTMCNC: 32.7 G/DL (ref 31.4–37.4)
MCV RBC AUTO: 98 FL (ref 82–98)
MONOCYTES # BLD AUTO: 0.87 THOUSAND/ΜL (ref 0.17–1.22)
MONOCYTES NFR BLD AUTO: 10 % (ref 4–12)
NEUTROPHILS # BLD AUTO: 6.46 THOUSANDS/ΜL (ref 1.85–7.62)
NEUTS SEG NFR BLD AUTO: 70 % (ref 43–75)
NITRITE UR QL STRIP: NEGATIVE
NON-SQ EPI CELLS URNS QL MICRO: ABNORMAL /HPF
NRBC BLD AUTO-RTO: 0 /100 WBCS
P AXIS: 44 DEGREES
PH UR STRIP.AUTO: 6 [PH]
PLATELET # BLD AUTO: 206 THOUSANDS/UL (ref 149–390)
PLATELET # BLD AUTO: 250 THOUSANDS/UL (ref 149–390)
PMV BLD AUTO: 11.1 FL (ref 8.9–12.7)
PMV BLD AUTO: 11.4 FL (ref 8.9–12.7)
POTASSIUM SERPL-SCNC: 4.1 MMOL/L (ref 3.5–5.3)
PR INTERVAL: 132 MS
PROT SERPL-MCNC: 8.3 G/DL (ref 6.4–8.2)
PROT UR STRIP-MCNC: ABNORMAL MG/DL
PROTHROMBIN TIME: 12.3 SECONDS (ref 11.8–14.2)
QRS AXIS: 83 DEGREES
QRSD INTERVAL: 128 MS
QT INTERVAL: 372 MS
QTC INTERVAL: 452 MS
RBC # BLD AUTO: 4.39 MILLION/UL (ref 3.81–5.12)
RBC #/AREA URNS AUTO: ABNORMAL /HPF
SODIUM SERPL-SCNC: 139 MMOL/L (ref 136–145)
SP GR UR STRIP.AUTO: <=1.005 (ref 1–1.03)
T WAVE AXIS: 47 DEGREES
TROPONIN I SERPL-MCNC: <0.02 NG/ML
TROPONIN I SERPL-MCNC: <0.02 NG/ML
UROBILINOGEN UR QL STRIP.AUTO: 0.2 E.U./DL
VENTRICULAR RATE: 89 BPM
WBC # BLD AUTO: 9.17 THOUSAND/UL (ref 4.31–10.16)
WBC #/AREA URNS AUTO: ABNORMAL /HPF

## 2019-05-23 PROCEDURE — 84145 PROCALCITONIN (PCT): CPT | Performed by: EMERGENCY MEDICINE

## 2019-05-23 PROCEDURE — 71045 X-RAY EXAM CHEST 1 VIEW: CPT

## 2019-05-23 PROCEDURE — 84484 ASSAY OF TROPONIN QUANT: CPT | Performed by: INTERNAL MEDICINE

## 2019-05-23 PROCEDURE — 83605 ASSAY OF LACTIC ACID: CPT | Performed by: INTERNAL MEDICINE

## 2019-05-23 PROCEDURE — 83605 ASSAY OF LACTIC ACID: CPT | Performed by: EMERGENCY MEDICINE

## 2019-05-23 PROCEDURE — 85049 AUTOMATED PLATELET COUNT: CPT | Performed by: INTERNAL MEDICINE

## 2019-05-23 PROCEDURE — 93005 ELECTROCARDIOGRAM TRACING: CPT

## 2019-05-23 PROCEDURE — 96374 THER/PROPH/DIAG INJ IV PUSH: CPT

## 2019-05-23 PROCEDURE — 87040 BLOOD CULTURE FOR BACTERIA: CPT | Performed by: EMERGENCY MEDICINE

## 2019-05-23 PROCEDURE — 85025 COMPLETE CBC W/AUTO DIFF WBC: CPT | Performed by: EMERGENCY MEDICINE

## 2019-05-23 PROCEDURE — 99223 1ST HOSP IP/OBS HIGH 75: CPT | Performed by: INTERNAL MEDICINE

## 2019-05-23 PROCEDURE — 81001 URINALYSIS AUTO W/SCOPE: CPT | Performed by: EMERGENCY MEDICINE

## 2019-05-23 PROCEDURE — 85730 THROMBOPLASTIN TIME PARTIAL: CPT | Performed by: EMERGENCY MEDICINE

## 2019-05-23 PROCEDURE — 93010 ELECTROCARDIOGRAM REPORT: CPT | Performed by: INTERNAL MEDICINE

## 2019-05-23 PROCEDURE — 99285 EMERGENCY DEPT VISIT HI MDM: CPT | Performed by: EMERGENCY MEDICINE

## 2019-05-23 PROCEDURE — 99285 EMERGENCY DEPT VISIT HI MDM: CPT

## 2019-05-23 PROCEDURE — 80053 COMPREHEN METABOLIC PANEL: CPT | Performed by: EMERGENCY MEDICINE

## 2019-05-23 PROCEDURE — 85610 PROTHROMBIN TIME: CPT | Performed by: EMERGENCY MEDICINE

## 2019-05-23 PROCEDURE — 36415 COLL VENOUS BLD VENIPUNCTURE: CPT | Performed by: EMERGENCY MEDICINE

## 2019-05-23 PROCEDURE — 84484 ASSAY OF TROPONIN QUANT: CPT | Performed by: EMERGENCY MEDICINE

## 2019-05-23 PROCEDURE — 82948 REAGENT STRIP/BLOOD GLUCOSE: CPT

## 2019-05-23 RX ORDER — HEPARIN SODIUM 5000 [USP'U]/ML
5000 INJECTION, SOLUTION INTRAVENOUS; SUBCUTANEOUS EVERY 8 HOURS SCHEDULED
Status: DISCONTINUED | OUTPATIENT
Start: 2019-05-23 | End: 2019-05-25 | Stop reason: HOSPADM

## 2019-05-23 RX ORDER — ASPIRIN 81 MG/1
81 TABLET ORAL DAILY
Status: DISCONTINUED | OUTPATIENT
Start: 2019-05-24 | End: 2019-05-25 | Stop reason: HOSPADM

## 2019-05-23 RX ORDER — ATORVASTATIN CALCIUM 40 MG/1
40 TABLET, FILM COATED ORAL
Status: DISCONTINUED | OUTPATIENT
Start: 2019-05-24 | End: 2019-05-25 | Stop reason: HOSPADM

## 2019-05-23 RX ORDER — ECHINACEA PURPUREA EXTRACT 125 MG
1 TABLET ORAL
Status: DISCONTINUED | OUTPATIENT
Start: 2019-05-23 | End: 2019-05-25 | Stop reason: HOSPADM

## 2019-05-23 RX ORDER — OXYBUTYNIN CHLORIDE 5 MG/1
5 TABLET ORAL 2 TIMES DAILY
Status: DISCONTINUED | OUTPATIENT
Start: 2019-05-23 | End: 2019-05-25 | Stop reason: HOSPADM

## 2019-05-23 RX ORDER — ACETAMINOPHEN 325 MG/1
650 TABLET ORAL EVERY 6 HOURS PRN
Status: DISCONTINUED | OUTPATIENT
Start: 2019-05-23 | End: 2019-05-25 | Stop reason: HOSPADM

## 2019-05-23 RX ORDER — LISINOPRIL 10 MG/1
10 TABLET ORAL DAILY
Status: DISCONTINUED | OUTPATIENT
Start: 2019-05-24 | End: 2019-05-25 | Stop reason: HOSPADM

## 2019-05-23 RX ORDER — ASPIRIN 81 MG/1
324 TABLET, CHEWABLE ORAL ONCE
Status: COMPLETED | OUTPATIENT
Start: 2019-05-23 | End: 2019-05-23

## 2019-05-23 RX ORDER — 0.9 % SODIUM CHLORIDE 0.9 %
3 VIAL (ML) INJECTION AS NEEDED
Status: DISCONTINUED | OUTPATIENT
Start: 2019-05-23 | End: 2019-05-25 | Stop reason: HOSPADM

## 2019-05-23 RX ORDER — AMLODIPINE BESYLATE 5 MG/1
5 TABLET ORAL DAILY
Status: DISCONTINUED | OUTPATIENT
Start: 2019-05-24 | End: 2019-05-25 | Stop reason: HOSPADM

## 2019-05-23 RX ORDER — OLOPATADINE HYDROCHLORIDE 1 MG/ML
1 SOLUTION/ DROPS OPHTHALMIC 2 TIMES DAILY
Status: DISCONTINUED | OUTPATIENT
Start: 2019-05-23 | End: 2019-05-25 | Stop reason: HOSPADM

## 2019-05-23 RX ADMIN — OXYBUTYNIN CHLORIDE 5 MG: 5 TABLET ORAL at 22:14

## 2019-05-23 RX ADMIN — HEPARIN SODIUM 5000 UNITS: 5000 INJECTION INTRAVENOUS; SUBCUTANEOUS at 22:15

## 2019-05-23 RX ADMIN — ASPIRIN 81 MG 324 MG: 81 TABLET ORAL at 19:47

## 2019-05-23 RX ADMIN — CEFTRIAXONE SODIUM 1000 MG: 10 INJECTION, POWDER, FOR SOLUTION INTRAVENOUS at 19:47

## 2019-05-24 ENCOUNTER — APPOINTMENT (INPATIENT)
Dept: NON INVASIVE DIAGNOSTICS | Facility: HOSPITAL | Age: 74
DRG: 313 | End: 2019-05-24
Payer: MEDICARE

## 2019-05-24 LAB
ANION GAP SERPL CALCULATED.3IONS-SCNC: 11 MMOL/L (ref 4–13)
BASOPHILS # BLD AUTO: 0.06 THOUSANDS/ΜL (ref 0–0.1)
BASOPHILS NFR BLD AUTO: 1 % (ref 0–1)
BUN SERPL-MCNC: 14 MG/DL (ref 5–25)
CALCIUM SERPL-MCNC: 9.4 MG/DL (ref 8.3–10.1)
CHEST PAIN STATEMENT: NORMAL
CHLORIDE SERPL-SCNC: 103 MMOL/L (ref 100–108)
CO2 SERPL-SCNC: 25 MMOL/L (ref 21–32)
CREAT SERPL-MCNC: 1.05 MG/DL (ref 0.6–1.3)
EOSINOPHIL # BLD AUTO: 0.57 THOUSAND/ΜL (ref 0–0.61)
EOSINOPHIL NFR BLD AUTO: 6 % (ref 0–6)
ERYTHROCYTE [DISTWIDTH] IN BLOOD BY AUTOMATED COUNT: 13.1 % (ref 11.6–15.1)
GFR SERPL CREATININE-BSD FRML MDRD: 52 ML/MIN/1.73SQ M
GLUCOSE SERPL-MCNC: 127 MG/DL (ref 65–140)
GLUCOSE SERPL-MCNC: 141 MG/DL (ref 65–140)
GLUCOSE SERPL-MCNC: 141 MG/DL (ref 65–140)
GLUCOSE SERPL-MCNC: 142 MG/DL (ref 65–140)
GLUCOSE SERPL-MCNC: 145 MG/DL (ref 65–140)
HCT VFR BLD AUTO: 38.5 % (ref 34.8–46.1)
HGB BLD-MCNC: 12.7 G/DL (ref 11.5–15.4)
IMM GRANULOCYTES # BLD AUTO: 0.04 THOUSAND/UL (ref 0–0.2)
IMM GRANULOCYTES NFR BLD AUTO: 0 % (ref 0–2)
LYMPHOCYTES # BLD AUTO: 1.07 THOUSANDS/ΜL (ref 0.6–4.47)
LYMPHOCYTES NFR BLD AUTO: 11 % (ref 14–44)
MAX DIASTOLIC BP: 58 MMHG
MAX HEART RATE: 137 BPM
MAX PREDICTED HEART RATE: 145 BPM
MAX. SYSTOLIC BP: 160 MMHG
MCH RBC QN AUTO: 32.1 PG (ref 26.8–34.3)
MCHC RBC AUTO-ENTMCNC: 33 G/DL (ref 31.4–37.4)
MCV RBC AUTO: 97 FL (ref 82–98)
MONOCYTES # BLD AUTO: 1.01 THOUSAND/ΜL (ref 0.17–1.22)
MONOCYTES NFR BLD AUTO: 10 % (ref 4–12)
NEUTROPHILS # BLD AUTO: 7 THOUSANDS/ΜL (ref 1.85–7.62)
NEUTS SEG NFR BLD AUTO: 72 % (ref 43–75)
NRBC BLD AUTO-RTO: 0 /100 WBCS
PLATELET # BLD AUTO: 207 THOUSANDS/UL (ref 149–390)
PMV BLD AUTO: 11.5 FL (ref 8.9–12.7)
POTASSIUM SERPL-SCNC: 3.5 MMOL/L (ref 3.5–5.3)
PROCALCITONIN SERPL-MCNC: <0.05 NG/ML
PROTOCOL NAME: NORMAL
RBC # BLD AUTO: 3.96 MILLION/UL (ref 3.81–5.12)
SODIUM SERPL-SCNC: 139 MMOL/L (ref 136–145)
TARGET HR FORMULA: NORMAL
TEST INDICATION: NORMAL
TIME IN EXERCISE PHASE: NORMAL
TROPONIN I SERPL-MCNC: <0.02 NG/ML
WBC # BLD AUTO: 9.75 THOUSAND/UL (ref 4.31–10.16)

## 2019-05-24 PROCEDURE — 82948 REAGENT STRIP/BLOOD GLUCOSE: CPT

## 2019-05-24 PROCEDURE — 99222 1ST HOSP IP/OBS MODERATE 55: CPT | Performed by: INTERNAL MEDICINE

## 2019-05-24 PROCEDURE — 85025 COMPLETE CBC W/AUTO DIFF WBC: CPT | Performed by: INTERNAL MEDICINE

## 2019-05-24 PROCEDURE — 99232 SBSQ HOSP IP/OBS MODERATE 35: CPT | Performed by: STUDENT IN AN ORGANIZED HEALTH CARE EDUCATION/TRAINING PROGRAM

## 2019-05-24 PROCEDURE — 93351 STRESS TTE COMPLETE: CPT | Performed by: INTERNAL MEDICINE

## 2019-05-24 PROCEDURE — 93350 STRESS TTE ONLY: CPT

## 2019-05-24 PROCEDURE — 93306 TTE W/DOPPLER COMPLETE: CPT | Performed by: INTERNAL MEDICINE

## 2019-05-24 PROCEDURE — 80048 BASIC METABOLIC PNL TOTAL CA: CPT | Performed by: INTERNAL MEDICINE

## 2019-05-24 PROCEDURE — 93306 TTE W/DOPPLER COMPLETE: CPT

## 2019-05-24 PROCEDURE — 84484 ASSAY OF TROPONIN QUANT: CPT | Performed by: INTERNAL MEDICINE

## 2019-05-24 RX ADMIN — OXYBUTYNIN CHLORIDE 5 MG: 5 TABLET ORAL at 09:59

## 2019-05-24 RX ADMIN — AMLODIPINE BESYLATE 5 MG: 5 TABLET ORAL at 09:59

## 2019-05-24 RX ADMIN — HEPARIN SODIUM 5000 UNITS: 5000 INJECTION INTRAVENOUS; SUBCUTANEOUS at 21:25

## 2019-05-24 RX ADMIN — HEPARIN SODIUM 5000 UNITS: 5000 INJECTION INTRAVENOUS; SUBCUTANEOUS at 05:37

## 2019-05-24 RX ADMIN — METFORMIN HYDROCHLORIDE 500 MG: 500 TABLET ORAL at 16:33

## 2019-05-24 RX ADMIN — LISINOPRIL 10 MG: 10 TABLET ORAL at 09:59

## 2019-05-24 RX ADMIN — METFORMIN HYDROCHLORIDE 500 MG: 500 TABLET ORAL at 09:00

## 2019-05-24 RX ADMIN — ATORVASTATIN CALCIUM 40 MG: 40 TABLET, FILM COATED ORAL at 16:33

## 2019-05-24 RX ADMIN — ASPIRIN 81 MG: 81 TABLET, COATED ORAL at 09:59

## 2019-05-24 RX ADMIN — HEPARIN SODIUM 5000 UNITS: 5000 INJECTION INTRAVENOUS; SUBCUTANEOUS at 15:40

## 2019-05-24 RX ADMIN — OXYBUTYNIN CHLORIDE 5 MG: 5 TABLET ORAL at 17:46

## 2019-05-25 VITALS
DIASTOLIC BLOOD PRESSURE: 92 MMHG | BODY MASS INDEX: 23.85 KG/M2 | RESPIRATION RATE: 18 BRPM | TEMPERATURE: 98.1 F | SYSTOLIC BLOOD PRESSURE: 121 MMHG | HEIGHT: 61 IN | HEART RATE: 55 BPM | OXYGEN SATURATION: 98 % | WEIGHT: 126.32 LBS

## 2019-05-25 LAB
GLUCOSE SERPL-MCNC: 101 MG/DL (ref 65–140)
GLUCOSE SERPL-MCNC: 115 MG/DL (ref 65–140)

## 2019-05-25 PROCEDURE — 99232 SBSQ HOSP IP/OBS MODERATE 35: CPT | Performed by: INTERNAL MEDICINE

## 2019-05-25 PROCEDURE — 82948 REAGENT STRIP/BLOOD GLUCOSE: CPT

## 2019-05-25 PROCEDURE — 99239 HOSP IP/OBS DSCHRG MGMT >30: CPT | Performed by: STUDENT IN AN ORGANIZED HEALTH CARE EDUCATION/TRAINING PROGRAM

## 2019-05-25 RX ADMIN — HEPARIN SODIUM 5000 UNITS: 5000 INJECTION INTRAVENOUS; SUBCUTANEOUS at 05:47

## 2019-05-25 RX ADMIN — OXYBUTYNIN CHLORIDE 5 MG: 5 TABLET ORAL at 08:29

## 2019-05-25 RX ADMIN — AMLODIPINE BESYLATE 5 MG: 5 TABLET ORAL at 08:29

## 2019-05-25 RX ADMIN — ASPIRIN 81 MG: 81 TABLET, COATED ORAL at 08:33

## 2019-05-25 RX ADMIN — METFORMIN HYDROCHLORIDE 500 MG: 500 TABLET ORAL at 08:29

## 2019-05-25 RX ADMIN — SALINE NASAL SPRAY 1 SPRAY: 1.5 SOLUTION NASAL at 08:29

## 2019-05-25 RX ADMIN — LISINOPRIL 10 MG: 10 TABLET ORAL at 08:29

## 2019-05-28 ENCOUNTER — TRANSITIONAL CARE MANAGEMENT (OUTPATIENT)
Dept: FAMILY MEDICINE CLINIC | Facility: CLINIC | Age: 74
End: 2019-05-28

## 2019-05-28 LAB
BACTERIA BLD CULT: NORMAL
BACTERIA BLD CULT: NORMAL

## 2019-05-31 ENCOUNTER — TELEPHONE (OUTPATIENT)
Dept: CARDIOLOGY CLINIC | Facility: CLINIC | Age: 74
End: 2019-05-31

## 2019-05-31 NOTE — TELEPHONE ENCOUNTER
LM ON PT DAUGHTER'S VM TO SCHED HOSP F/U  WE ARE 4 WEEKS OUT, IF PT DOESN'T WANT TO WAIT WAS GOING TO OFFER [place] NUMBER

## 2019-06-06 ENCOUNTER — OFFICE VISIT (OUTPATIENT)
Dept: FAMILY MEDICINE CLINIC | Facility: CLINIC | Age: 74
End: 2019-06-06
Payer: MEDICARE

## 2019-06-06 ENCOUNTER — TRANSITIONAL CARE MANAGEMENT (OUTPATIENT)
Dept: FAMILY MEDICINE CLINIC | Facility: CLINIC | Age: 74
End: 2019-06-06

## 2019-06-06 VITALS
SYSTOLIC BLOOD PRESSURE: 148 MMHG | OXYGEN SATURATION: 97 % | DIASTOLIC BLOOD PRESSURE: 72 MMHG | WEIGHT: 125.6 LBS | HEART RATE: 88 BPM | BODY MASS INDEX: 23.71 KG/M2 | HEIGHT: 61 IN | TEMPERATURE: 98.3 F

## 2019-06-06 DIAGNOSIS — N18.30 CKD (CHRONIC KIDNEY DISEASE) STAGE 3, GFR 30-59 ML/MIN (HCC): ICD-10-CM

## 2019-06-06 DIAGNOSIS — I10 ESSENTIAL HYPERTENSION: ICD-10-CM

## 2019-06-06 DIAGNOSIS — E11.9 TYPE 2 DIABETES MELLITUS WITHOUT COMPLICATION, WITHOUT LONG-TERM CURRENT USE OF INSULIN (HCC): ICD-10-CM

## 2019-06-06 DIAGNOSIS — IMO0001 TRANSITION OF CARE PERFORMED WITH SHARING OF CLINICAL SUMMARY: Primary | ICD-10-CM

## 2019-06-06 DIAGNOSIS — E11.8 TYPE 2 DIABETES MELLITUS WITH COMPLICATION, WITHOUT LONG-TERM CURRENT USE OF INSULIN (HCC): ICD-10-CM

## 2019-06-06 DIAGNOSIS — E78.2 HYPERLIPIDEMIA, MIXED: ICD-10-CM

## 2019-06-06 PROCEDURE — 99495 TRANSJ CARE MGMT MOD F2F 14D: CPT | Performed by: NURSE PRACTITIONER

## 2019-06-06 RX ORDER — ASPIRIN 81 MG/1
81 TABLET ORAL DAILY
Qty: 90 TABLET | Refills: 0
Start: 2019-06-06 | End: 2019-09-27

## 2019-06-06 RX ORDER — LISINOPRIL 10 MG/1
10 TABLET ORAL DAILY
Qty: 90 TABLET | Refills: 2 | Status: SHIPPED | OUTPATIENT
Start: 2019-06-06 | End: 2019-10-03 | Stop reason: SDUPTHER

## 2019-06-25 DIAGNOSIS — N32.81 OAB (OVERACTIVE BLADDER): ICD-10-CM

## 2019-06-25 RX ORDER — OXYBUTYNIN CHLORIDE 5 MG/1
5 TABLET ORAL 2 TIMES DAILY
Qty: 90 TABLET | Refills: 2 | Status: SHIPPED | OUTPATIENT
Start: 2019-06-25 | End: 2019-10-03 | Stop reason: SDUPTHER

## 2019-09-23 ENCOUNTER — HOSPITAL ENCOUNTER (EMERGENCY)
Facility: HOSPITAL | Age: 74
Discharge: HOME | DRG: 690 | End: 2019-09-23
Attending: EMERGENCY MEDICINE | Admitting: EMERGENCY MEDICINE
Payer: MEDICARE

## 2019-09-23 VITALS
HEART RATE: 68 BPM | TEMPERATURE: 98.3 F | HEIGHT: 61 IN | DIASTOLIC BLOOD PRESSURE: 78 MMHG | BODY MASS INDEX: 22.84 KG/M2 | RESPIRATION RATE: 18 BRPM | WEIGHT: 121 LBS | OXYGEN SATURATION: 99 % | SYSTOLIC BLOOD PRESSURE: 136 MMHG

## 2019-09-23 DIAGNOSIS — N39.0 UTI (URINARY TRACT INFECTION): Primary | ICD-10-CM

## 2019-09-23 LAB
ALBUMIN SERPL BCP-MCNC: 3.8 G/DL (ref 3.5–5)
ALP SERPL-CCNC: 83 U/L (ref 46–116)
ALT SERPL W P-5'-P-CCNC: 19 U/L (ref 12–78)
ANION GAP SERPL CALCULATED.3IONS-SCNC: 11 MMOL/L (ref 4–13)
AST SERPL W P-5'-P-CCNC: 15 U/L (ref 5–45)
BACTERIA UR QL AUTO: ABNORMAL /HPF
BASOPHILS # BLD AUTO: 0.08 THOUSANDS/ΜL (ref 0–0.1)
BASOPHILS NFR BLD AUTO: 1 % (ref 0–1)
BILIRUB SERPL-MCNC: 0.4 MG/DL (ref 0.2–1)
BILIRUB UR QL STRIP: NEGATIVE
BUN SERPL-MCNC: 15 MG/DL (ref 5–25)
CALCIUM SERPL-MCNC: 10.2 MG/DL (ref 8.3–10.1)
CHLORIDE SERPL-SCNC: 101 MMOL/L (ref 100–108)
CLARITY UR: CLEAR
CO2 SERPL-SCNC: 29 MMOL/L (ref 21–32)
COLOR UR: YELLOW
CREAT SERPL-MCNC: 1.08 MG/DL (ref 0.6–1.3)
EOSINOPHIL # BLD AUTO: 0.47 THOUSAND/ΜL (ref 0–0.61)
EOSINOPHIL NFR BLD AUTO: 7 % (ref 0–6)
ERYTHROCYTE [DISTWIDTH] IN BLOOD BY AUTOMATED COUNT: 13 % (ref 11.6–15.1)
GFR SERPL CREATININE-BSD FRML MDRD: 50 ML/MIN/1.73SQ M
GLUCOSE SERPL-MCNC: 110 MG/DL (ref 65–140)
GLUCOSE UR STRIP-MCNC: NEGATIVE MG/DL
HCT VFR BLD AUTO: 42.4 % (ref 34.8–46.1)
HGB BLD-MCNC: 13.7 G/DL (ref 11.5–15.4)
HGB UR QL STRIP.AUTO: NEGATIVE
IMM GRANULOCYTES # BLD AUTO: 0.01 THOUSAND/UL (ref 0–0.2)
IMM GRANULOCYTES NFR BLD AUTO: 0 % (ref 0–2)
KETONES UR STRIP-MCNC: NEGATIVE MG/DL
LEUKOCYTE ESTERASE UR QL STRIP: ABNORMAL
LYMPHOCYTES # BLD AUTO: 1.79 THOUSANDS/ΜL (ref 0.6–4.47)
LYMPHOCYTES NFR BLD AUTO: 25 % (ref 14–44)
MCH RBC QN AUTO: 31.6 PG (ref 26.8–34.3)
MCHC RBC AUTO-ENTMCNC: 32.3 G/DL (ref 31.4–37.4)
MCV RBC AUTO: 98 FL (ref 82–98)
MONOCYTES # BLD AUTO: 0.69 THOUSAND/ΜL (ref 0.17–1.22)
MONOCYTES NFR BLD AUTO: 10 % (ref 4–12)
NEUTROPHILS # BLD AUTO: 4.06 THOUSANDS/ΜL (ref 1.85–7.62)
NEUTS SEG NFR BLD AUTO: 57 % (ref 43–75)
NITRITE UR QL STRIP: NEGATIVE
NON-SQ EPI CELLS URNS QL MICRO: ABNORMAL /HPF
NRBC BLD AUTO-RTO: 0 /100 WBCS
PH UR STRIP.AUTO: 5.5 [PH]
PLATELET # BLD AUTO: 283 THOUSANDS/UL (ref 149–390)
PMV BLD AUTO: 11.6 FL (ref 8.9–12.7)
POTASSIUM SERPL-SCNC: 4.2 MMOL/L (ref 3.5–5.3)
PROT SERPL-MCNC: 8.1 G/DL (ref 6.4–8.2)
PROT UR STRIP-MCNC: NEGATIVE MG/DL
RBC # BLD AUTO: 4.34 MILLION/UL (ref 3.81–5.12)
RBC #/AREA URNS AUTO: ABNORMAL /HPF
SODIUM SERPL-SCNC: 141 MMOL/L (ref 136–145)
SP GR UR STRIP.AUTO: <=1.005 (ref 1–1.03)
UROBILINOGEN UR QL STRIP.AUTO: 0.2 E.U./DL
WBC # BLD AUTO: 7.1 THOUSAND/UL (ref 4.31–10.16)
WBC #/AREA URNS AUTO: ABNORMAL /HPF

## 2019-09-23 PROCEDURE — 99284 EMERGENCY DEPT VISIT MOD MDM: CPT

## 2019-09-23 PROCEDURE — 96360 HYDRATION IV INFUSION INIT: CPT

## 2019-09-23 PROCEDURE — 80053 COMPREHEN METABOLIC PANEL: CPT | Performed by: PHYSICIAN ASSISTANT

## 2019-09-23 PROCEDURE — 36415 COLL VENOUS BLD VENIPUNCTURE: CPT | Performed by: PHYSICIAN ASSISTANT

## 2019-09-23 PROCEDURE — 99284 EMERGENCY DEPT VISIT MOD MDM: CPT | Performed by: PHYSICIAN ASSISTANT

## 2019-09-23 PROCEDURE — 81001 URINALYSIS AUTO W/SCOPE: CPT | Performed by: PHYSICIAN ASSISTANT

## 2019-09-23 PROCEDURE — 85025 COMPLETE CBC W/AUTO DIFF WBC: CPT | Performed by: PHYSICIAN ASSISTANT

## 2019-09-23 RX ORDER — NITROFURANTOIN 25; 75 MG/1; MG/1
100 CAPSULE ORAL ONCE
Status: COMPLETED | OUTPATIENT
Start: 2019-09-23 | End: 2019-09-23

## 2019-09-23 RX ORDER — NITROFURANTOIN 25; 75 MG/1; MG/1
100 CAPSULE ORAL 2 TIMES DAILY
Qty: 14 CAPSULE | Refills: 0 | Status: SHIPPED | OUTPATIENT
Start: 2019-09-23 | End: 2019-09-27 | Stop reason: HOSPADM

## 2019-09-23 RX ADMIN — NITROFURANTOIN (MONOHYDRATE/MACROCRYSTALS) 100 MG: 75; 25 CAPSULE ORAL at 18:48

## 2019-09-23 RX ADMIN — SODIUM CHLORIDE 1000 ML: 0.9 INJECTION, SOLUTION INTRAVENOUS at 14:47

## 2019-09-23 NOTE — ED PROVIDER NOTES
History  Chief Complaint   Patient presents with    Flank Pain     pt states to have left sided flank pain that started 2-3 days ago  pt c/o right and left lower abdominal cramping  53-year-old female here for evaluation of left flank pain  Onset over the past 1-2 days  He states it feels similar when she had pyelonephritis in the past   Pain mild compared to that  Waxes and wanes in intensity  Does have some mild dysuria  No fever no chills no nausea vomiting  Does not wrap around to the abdomen  Denies any trauma  She is a diabetic, does not routinely check her blood glucose  No history of any kind of immuno compromising diseases or medications  History provided by:  Patient   used: No    Flank Pain   Pain location:  L flank  Pain quality: aching    Pain radiates to:  Does not radiate  Pain severity:  Mild  Onset quality:  Gradual  Duration:  2 days  Timing:  Constant  Progression:  Waxing and waning  Chronicity:  New  Context: not alcohol use, not awakening from sleep, not diet changes, not eating, not laxative use, not medication withdrawal, not previous surgeries, not recent illness, not recent sexual activity, not recent travel, not retching, not sick contacts, not suspicious food intake and not trauma    Relieved by:  Nothing  Worsened by:  Nothing  Ineffective treatments:  None tried  Associated symptoms: nausea    Associated symptoms: no anorexia, no belching, no chest pain, no chills, no constipation, no cough, no diarrhea, no dysuria, no fatigue, no fever, no flatus, no hematemesis, no hematochezia, no hematuria, no melena, no shortness of breath, no sore throat and no vomiting    Risk factors: no alcohol abuse, no aspirin use, not elderly, has not had multiple surgeries, no NSAID use, not obese, not pregnant and no recent hospitalization        Prior to Admission Medications   Prescriptions Last Dose Informant Patient Reported? Taking?    amLODIPine (NORVASC) 5 mg tablet   No Yes   Sig: Take 1 tablet (5 mg total) by mouth daily for 180 days   aspirin (ECOTRIN LOW STRENGTH) 81 mg EC tablet   No Yes   Sig: Take 1 tablet (81 mg total) by mouth daily for 90 days   atorvastatin (LIPITOR) 10 mg tablet   No Yes   Sig: Take 1 tablet (10 mg total) by mouth daily for 90 days   lisinopril (ZESTRIL) 10 mg tablet   No Yes   Sig: Take 1 tablet (10 mg total) by mouth daily for 90 days   metFORMIN (GLUCOPHAGE) 500 mg tablet   No Yes   Sig: Take 1 tablet (500 mg total) by mouth 2 (two) times a day with meals for 90 days   olopatadine (PATANOL) 0 1 % ophthalmic solution   No Yes   Sig: Administer 1 drop to both eyes 2 (two) times a day for 5 days   oxybutynin (DITROPAN) 5 mg tablet   No Yes   Sig: Take 1 tablet (5 mg total) by mouth 2 (two) times a day for 90 days      Facility-Administered Medications: None       Past Medical History:   Diagnosis Date    [facility]     Patient states "I quit my medication"    Eczema     Hypertension     Malignant neoplasm of skin     LAST ASSESSED: [id number]    Nonmelanoma skin cancer     LAST ASSESSED: [id number]    Renal disorder        Past Surgical History:   Procedure Laterality Date    EYE SURGERY      catarcts removal both    SKIN BIOPSY      face and arm       Family History   Problem Relation Age of Onset    Diabetes Mother     Skin cancer Mother      I have reviewed and agree with the history as documented  Social History     Tobacco Use    Smoking status: Never Smoker    Smokeless tobacco: Never Used   Substance Use Topics    Alcohol use: No     Alcohol/week: 0 0 standard drinks     Frequency: Never     Drinks per session: 1 or 2     Binge frequency: Never    Drug use: No        Review of Systems   Constitutional: Negative for activity change, appetite change, chills, diaphoresis, fatigue, fever and unexpected weight change  HENT: Negative for congestion, rhinorrhea, sinus pressure, sore throat and trouble swallowing  Eyes: Negative for photophobia and visual disturbance  Respiratory: Negative for apnea, cough, choking, chest tightness, shortness of breath, wheezing and stridor  Cardiovascular: Negative for chest pain, palpitations and leg swelling  Gastrointestinal: Positive for nausea  Negative for abdominal distention, abdominal pain, anorexia, blood in stool, constipation, diarrhea, flatus, hematemesis, hematochezia, melena and vomiting  Genitourinary: Positive for flank pain  Negative for decreased urine volume, difficulty urinating, dysuria, enuresis, frequency, hematuria and urgency  Musculoskeletal: Negative for arthralgias, myalgias, neck pain and neck stiffness  Skin: Negative for color change, pallor, rash and wound  Allergic/Immunologic: Negative  Neurological: Negative for dizziness, tremors, syncope, weakness, light-headedness, numbness and headaches  Hematological: Negative  Psychiatric/Behavioral: Negative  All other systems reviewed and are negative  Physical Exam  Physical Exam   Constitutional: She is oriented to person, place, and time  She appears well-developed and well-nourished  Non-toxic appearance  She does not have a sickly appearance  She does not appear ill  No distress  HENT:   Head: Normocephalic and atraumatic  Eyes: Pupils are equal, round, and reactive to light  EOM and lids are normal    Neck: Normal range of motion  Neck supple  Cardiovascular: Normal rate, regular rhythm, S1 normal, S2 normal, normal heart sounds, intact distal pulses and normal pulses  Exam reveals no gallop, no distant heart sounds, no friction rub and no decreased pulses  No murmur heard  Pulses:       Radial pulses are 2+ on the right side, and 2+ on the left side  Pulmonary/Chest: Effort normal and breath sounds normal  No accessory muscle usage  No apnea, no tachypnea and no bradypnea  No respiratory distress  She has no decreased breath sounds  She has no wheezes   She has no rhonchi  She has no rales  Abdominal: Soft  Normal appearance  She exhibits no distension  There is tenderness (Left flank)  There is CVA tenderness ( mild)  There is no rigidity, no rebound and no guarding  Musculoskeletal: Normal range of motion  She exhibits no edema, tenderness or deformity  Neurological: She is alert and oriented to person, place, and time  No cranial nerve deficit  GCS eye subscore is 4  GCS verbal subscore is 5  GCS motor subscore is 6  Skin: Skin is warm, dry and intact  No rash noted  She is not diaphoretic  No erythema  No pallor  Psychiatric: Her speech is normal    Nursing note and vitals reviewed        Vital Signs  ED Triage Vitals   Temperature Pulse Respirations Blood Pressure SpO2   09/23/19 1307 09/23/19 1307 09/23/19 1307 09/23/19 1308 09/23/19 1307   98 3 °F (36 8 °C) 67 17 144/71 96 %      Temp Source Heart Rate Source Patient Position - Orthostatic VS BP Location FiO2 (%)   09/23/19 1307 09/23/19 1307 09/23/19 1307 09/23/19 1307 --   Oral Monitor Sitting Left arm       Pain Score       09/23/19 1307       5           Vitals:    09/23/19 1307 09/23/19 1308 09/23/19 1508 09/23/19 1720   BP:  144/71 140/74 136/78   Pulse: 67  70 68   Patient Position - Orthostatic VS: Sitting  Lying Sitting         Visual Acuity      ED Medications  Medications   nitrofurantoin (MACROBID) extended-release capsule 100 mg (has no administration in time range)   sodium chloride 0 9 % bolus 1,000 mL (0 mL Intravenous Stopped 9/23/19 1604)       Diagnostic Studies  Results Reviewed     Procedure Component Value Units Date/Time    Comprehensive metabolic panel [[id number]]  (Abnormal) Collected:  09/23/19 1443    Lab Status:  Final result Specimen:  Blood from Arm, Right Updated:  09/23/19 1634     Sodium 141 mmol/L      Potassium 4 2 mmol/L      Chloride 101 mmol/L      CO2 29 mmol/L      ANION GAP 11 mmol/L      BUN 15 mg/dL      Creatinine 1 08 mg/dL      Glucose 110 mg/dL      Calcium 10 2 mg/dL      AST 15 U/L      ALT 19 U/L      Alkaline Phosphatase 83 U/L      Total Protein 8 1 g/dL      Albumin 3 8 g/dL      Total Bilirubin 0 40 mg/dL      eGFR 50 ml/min/1 73sq m     Narrative:       National Kidney Disease Foundation guidelines for Chronic Kidney Disease (CKD):     Stage 1 with normal or high GFR (GFR > 90 mL/min/1 73 square meters)    Stage 2 Mild CKD (GFR = 60-89 mL/min/1 73 square meters)    Stage 3A Moderate CKD (GFR = 45-59 mL/min/1 73 square meters)    Stage 3B Moderate CKD (GFR = 30-44 mL/min/1 73 square meters)    Stage 4 Severe CKD (GFR = 15-29 mL/min/1 73 square meters)    Stage 5 End Stage CKD (GFR <15 mL/min/1 73 square meters)  Note: GFR calculation is accurate only with a steady state creatinine    Urine Microscopic [[id number]]  (Abnormal) Collected:  09/23/19 1439    Lab Status:  Final result Specimen:  Urine, Clean Catch Updated:  09/23/19 1629     RBC, UA None Seen /hpf      WBC, UA 2-4 /hpf      Epithelial Cells Occasional /hpf      Bacteria, UA Occasional /hpf     UA w Reflex to Microscopic w Reflex to Culture [[id number]]  (Abnormal) Collected:  09/23/19 1439    Lab Status:  Final result Specimen:  Urine, Clean Catch Updated:  09/23/19 1610     Color, UA Yellow     Clarity, UA Clear     Specific Gravity, UA <=1 005     pH, UA 5 5     Leukocytes, UA Small     Nitrite, UA Negative     Protein, UA Negative mg/dl      Glucose, UA Negative mg/dl      Ketones, UA Negative mg/dl      Urobilinogen, UA 0 2 E U /dl      Bilirubin, UA Negative     Blood, UA Negative    CBC and differential [[id number]]  (Abnormal) Collected:  09/23/19 1443    Lab Status:  Final result Specimen:  Blood from Arm, Right Updated:  09/23/19 1608     WBC 7 10 Thousand/uL      RBC 4 34 Million/uL      Hemoglobin 13 7 g/dL      Hematocrit 42 4 %      MCV 98 fL      MCH 31 6 pg      MCHC 32 3 g/dL      RDW 13 0 %      MPV 11 6 fL      Platelets 285 Thousands/uL      nRBC 0 /100 WBCs      Neutrophils Relative 57 %      Immat GRANS % 0 %      Lymphocytes Relative 25 %      Monocytes Relative 10 %      Eosinophils Relative 7 %      Basophils Relative 1 %      Neutrophils Absolute 4 06 Thousands/µL      Immature Grans Absolute 0 01 Thousand/uL      Lymphocytes Absolute 1 79 Thousands/µL      Monocytes Absolute 0 69 Thousand/µL      Eosinophils Absolute 0 47 Thousand/µL      Basophils Absolute 0 08 Thousands/µL                  No orders to display              Procedures  Procedures       ED Course           Identification of Seniors at Risk      Most Recent Value   (ISAR) Identification of Seniors at Risk   Before the illness or injury that brought you to the Emergency, did you need someone to help you on a regular basis? 1 Filed at: 09/23/2019 1309   In the last 24 hours, have you needed more help than usual?  0 Filed at: 09/23/2019 1309   Have you been hospitalized for one or more nights during the past 6 months? 1 Filed at: 09/23/2019 1309   In general, do you see well? 1 Filed at: 09/23/2019 1309   In general, do you have serious problems with your memory? 0 Filed at: 09/23/2019 1309   Do you take more than three different medications every day? 1 Filed at: 09/23/2019 1309   ISAR Score  4 Filed at: 09/23/2019 1309                          MDM  Number of Diagnoses or Management Options  UTI (urinary tract infection): new and requires workup  Diagnosis management comments: Differential diagnosis includes but is not limited to UTI including cystitis and pyelonephritis, nephrolithiasis, peptic ulcer disease, gastritis, enteritis, colitis  Plan:  Check urinalysis CBC CMP fluids  Dispo pending  Amount and/or Complexity of Data Reviewed  Clinical lab tests: ordered and reviewed  Tests in the radiology section of CPT®: ordered and reviewed    Risk of Complications, Morbidity, and/or Mortality  Presenting problems: moderate  Management options: low  General comments: 66year-old with left leg pain  Dysuria    She feels this is the start of her UTI  Her urinalysis here does have occasional bacteria, overall no overwhelming signs of infection however with occasional bacteria and symptoms consistent with a UTI, this could be early on in the development of her UTI  I discussed further evaluation here given a urine sample which is not completely definitive for UTI, alternately we can start her on treatment for UTI and have her follow up as an outpatient  She does have an appointment on Wednesday with her family doctor  Will discharge home with antibiotics  We discussed return parameters  Patient understands and agrees with plan  Patient Progress  Patient progress: stable      Disposition  Final diagnoses:   UTI (urinary tract infection)     Time reflects when diagnosis was documented in both MDM as applicable and the Disposition within this note     Time User Action Codes Description Comment    9/23/2019  6:42 PM Edelmira KAPOOR Add [N39 0] UTI (urinary tract infection)       ED Disposition     ED Disposition Condition Date/Time Comment    Discharge Stable Mon Sep 23, 2019  6:42 PM Alma Delia Rice discharge to home/self care  Follow-up Information     Follow up With Specialties Details Why [address] [place] [address], [address] [place] [address] Nurse Practitioner Call  go to your appointment Wednesday [phone]  [phone] [place]  [phone]            Patient's Medications   Discharge Prescriptions    NITROFURANTOIN (MACROBID) 100 MG CAPSULE    Take 1 capsule (100 mg total) by mouth 2 (two) times a day for 7 days       Start Date: 9/23/2019 End Date: 9/30/2019       Order Dose: 100 mg       Quantity: 14 capsule    Refills: 0     No discharge procedures on file      ED Provider  Electronically Signed by           Lamberto Forrest PA-C  09/23/19 [id number]

## 2019-09-25 ENCOUNTER — HOSPITAL ENCOUNTER (INPATIENT)
Facility: HOSPITAL | Age: 74
LOS: 2 days | Discharge: HOME | DRG: 690 | End: 2019-09-27
Attending: EMERGENCY MEDICINE | Admitting: INTERNAL MEDICINE
Payer: MEDICARE

## 2019-09-25 DIAGNOSIS — N39.0 UTI (URINARY TRACT INFECTION): Primary | ICD-10-CM

## 2019-09-25 PROBLEM — R50.9 FEVER: Status: ACTIVE | Noted: 2019-09-25

## 2019-09-25 PROBLEM — R07.9 CHEST PAIN: Status: RESOLVED | Noted: 2019-05-23 | Resolved: 2019-09-25

## 2019-09-25 LAB
ALBUMIN SERPL BCP-MCNC: 3.4 G/DL (ref 3.5–5)
ALP SERPL-CCNC: 96 U/L (ref 46–116)
ALT SERPL W P-5'-P-CCNC: 29 U/L (ref 12–78)
ANION GAP SERPL CALCULATED.3IONS-SCNC: 9 MMOL/L (ref 4–13)
AST SERPL W P-5'-P-CCNC: 22 U/L (ref 5–45)
BACTERIA UR QL AUTO: ABNORMAL /HPF
BASOPHILS # BLD AUTO: 0.04 THOUSANDS/ΜL (ref 0–0.1)
BASOPHILS NFR BLD AUTO: 0 % (ref 0–1)
BILIRUB SERPL-MCNC: 0.6 MG/DL (ref 0.2–1)
BILIRUB UR QL STRIP: NEGATIVE
BUN SERPL-MCNC: 15 MG/DL (ref 5–25)
CALCIUM SERPL-MCNC: 10 MG/DL (ref 8.3–10.1)
CHLORIDE SERPL-SCNC: 99 MMOL/L (ref 100–108)
CLARITY UR: ABNORMAL
CO2 SERPL-SCNC: 27 MMOL/L (ref 21–32)
COLOR UR: YELLOW
CREAT SERPL-MCNC: 1.44 MG/DL (ref 0.6–1.3)
EOSINOPHIL # BLD AUTO: 0.55 THOUSAND/ΜL (ref 0–0.61)
EOSINOPHIL NFR BLD AUTO: 5 % (ref 0–6)
ERYTHROCYTE [DISTWIDTH] IN BLOOD BY AUTOMATED COUNT: 13.2 % (ref 11.6–15.1)
GFR SERPL CREATININE-BSD FRML MDRD: 35 ML/MIN/1.73SQ M
GLUCOSE SERPL-MCNC: 130 MG/DL (ref 65–140)
GLUCOSE SERPL-MCNC: 156 MG/DL (ref 65–140)
GLUCOSE UR STRIP-MCNC: NEGATIVE MG/DL
HCT VFR BLD AUTO: 39.3 % (ref 34.8–46.1)
HGB BLD-MCNC: 12.8 G/DL (ref 11.5–15.4)
HGB UR QL STRIP.AUTO: NEGATIVE
IMM GRANULOCYTES # BLD AUTO: 0.04 THOUSAND/UL (ref 0–0.2)
IMM GRANULOCYTES NFR BLD AUTO: 0 % (ref 0–2)
KETONES UR STRIP-MCNC: NEGATIVE MG/DL
LEUKOCYTE ESTERASE UR QL STRIP: ABNORMAL
LYMPHOCYTES # BLD AUTO: 0.57 THOUSANDS/ΜL (ref 0.6–4.47)
LYMPHOCYTES NFR BLD AUTO: 5 % (ref 14–44)
MCH RBC QN AUTO: 31.8 PG (ref 26.8–34.3)
MCHC RBC AUTO-ENTMCNC: 32.6 G/DL (ref 31.4–37.4)
MCV RBC AUTO: 98 FL (ref 82–98)
MONOCYTES # BLD AUTO: 0.59 THOUSAND/ΜL (ref 0.17–1.22)
MONOCYTES NFR BLD AUTO: 5 % (ref 4–12)
NEUTROPHILS # BLD AUTO: 9.73 THOUSANDS/ΜL (ref 1.85–7.62)
NEUTS SEG NFR BLD AUTO: 85 % (ref 43–75)
NITRITE UR QL STRIP: NEGATIVE
NON-SQ EPI CELLS URNS QL MICRO: ABNORMAL /HPF
NRBC BLD AUTO-RTO: 0 /100 WBCS
PH UR STRIP.AUTO: 6 [PH]
PLATELET # BLD AUTO: 238 THOUSANDS/UL (ref 149–390)
PMV BLD AUTO: 11 FL (ref 8.9–12.7)
POTASSIUM SERPL-SCNC: 3.6 MMOL/L (ref 3.5–5.3)
PROT SERPL-MCNC: 7.5 G/DL (ref 6.4–8.2)
PROT UR STRIP-MCNC: NEGATIVE MG/DL
RBC # BLD AUTO: 4.02 MILLION/UL (ref 3.81–5.12)
RBC #/AREA URNS AUTO: ABNORMAL /HPF
SODIUM SERPL-SCNC: 135 MMOL/L (ref 136–145)
SP GR UR STRIP.AUTO: <=1.005 (ref 1–1.03)
UROBILINOGEN UR QL STRIP.AUTO: 0.2 E.U./DL
WBC # BLD AUTO: 11.52 THOUSAND/UL (ref 4.31–10.16)
WBC #/AREA URNS AUTO: ABNORMAL /HPF

## 2019-09-25 PROCEDURE — 87086 URINE CULTURE/COLONY COUNT: CPT | Performed by: NURSE PRACTITIONER

## 2019-09-25 PROCEDURE — 99285 EMERGENCY DEPT VISIT HI MDM: CPT

## 2019-09-25 PROCEDURE — 99285 EMERGENCY DEPT VISIT HI MDM: CPT | Performed by: EMERGENCY MEDICINE

## 2019-09-25 PROCEDURE — 81001 URINALYSIS AUTO W/SCOPE: CPT | Performed by: EMERGENCY MEDICINE

## 2019-09-25 PROCEDURE — 96360 HYDRATION IV INFUSION INIT: CPT

## 2019-09-25 PROCEDURE — 99223 1ST HOSP IP/OBS HIGH 75: CPT | Performed by: INTERNAL MEDICINE

## 2019-09-25 PROCEDURE — 87040 BLOOD CULTURE FOR BACTERIA: CPT | Performed by: EMERGENCY MEDICINE

## 2019-09-25 PROCEDURE — 80053 COMPREHEN METABOLIC PANEL: CPT | Performed by: EMERGENCY MEDICINE

## 2019-09-25 PROCEDURE — 82948 REAGENT STRIP/BLOOD GLUCOSE: CPT

## 2019-09-25 PROCEDURE — 36415 COLL VENOUS BLD VENIPUNCTURE: CPT | Performed by: EMERGENCY MEDICINE

## 2019-09-25 PROCEDURE — 85025 COMPLETE CBC W/AUTO DIFF WBC: CPT | Performed by: EMERGENCY MEDICINE

## 2019-09-25 RX ORDER — OXYBUTYNIN CHLORIDE 5 MG/1
5 TABLET ORAL 2 TIMES DAILY
Status: DISCONTINUED | OUTPATIENT
Start: 2019-09-25 | End: 2019-09-25

## 2019-09-25 RX ORDER — OXYBUTYNIN CHLORIDE 5 MG/1
5 TABLET ORAL 3 TIMES DAILY
Status: DISCONTINUED | OUTPATIENT
Start: 2019-09-25 | End: 2019-09-27 | Stop reason: HOSPADM

## 2019-09-25 RX ORDER — AMLODIPINE BESYLATE 5 MG/1
5 TABLET ORAL DAILY
Status: DISCONTINUED | OUTPATIENT
Start: 2019-09-26 | End: 2019-09-27 | Stop reason: HOSPADM

## 2019-09-25 RX ORDER — CALCIUM CARBONATE 200(500)MG
1000 TABLET,CHEWABLE ORAL DAILY PRN
Status: DISCONTINUED | OUTPATIENT
Start: 2019-09-25 | End: 2019-09-27 | Stop reason: HOSPADM

## 2019-09-25 RX ORDER — HEPARIN SODIUM 5000 [USP'U]/ML
5000 INJECTION, SOLUTION INTRAVENOUS; SUBCUTANEOUS EVERY 8 HOURS SCHEDULED
Status: DISCONTINUED | OUTPATIENT
Start: 2019-09-25 | End: 2019-09-27 | Stop reason: HOSPADM

## 2019-09-25 RX ORDER — ONDANSETRON 2 MG/ML
4 INJECTION INTRAMUSCULAR; INTRAVENOUS EVERY 6 HOURS PRN
Status: DISCONTINUED | OUTPATIENT
Start: 2019-09-25 | End: 2019-09-27 | Stop reason: HOSPADM

## 2019-09-25 RX ORDER — ATORVASTATIN CALCIUM 10 MG/1
10 TABLET, FILM COATED ORAL DAILY
Status: DISCONTINUED | OUTPATIENT
Start: 2019-09-26 | End: 2019-09-27 | Stop reason: HOSPADM

## 2019-09-25 RX ORDER — SODIUM CHLORIDE, SODIUM GLUCONATE, SODIUM ACETATE, POTASSIUM CHLORIDE, MAGNESIUM CHLORIDE, SODIUM PHOSPHATE, DIBASIC, AND POTASSIUM PHOSPHATE .53; .5; .37; .037; .03; .012; .00082 G/100ML; G/100ML; G/100ML; G/100ML; G/100ML; G/100ML; G/100ML
100 INJECTION, SOLUTION INTRAVENOUS CONTINUOUS
Status: DISCONTINUED | OUTPATIENT
Start: 2019-09-25 | End: 2019-09-26

## 2019-09-25 RX ORDER — ASPIRIN 81 MG/1
81 TABLET ORAL DAILY
Status: DISCONTINUED | OUTPATIENT
Start: 2019-09-26 | End: 2019-09-27 | Stop reason: HOSPADM

## 2019-09-25 RX ORDER — SODIUM CHLORIDE 9 MG/ML
75 INJECTION, SOLUTION INTRAVENOUS CONTINUOUS
Status: DISCONTINUED | OUTPATIENT
Start: 2019-09-25 | End: 2019-09-25

## 2019-09-25 RX ORDER — ACETAMINOPHEN 325 MG/1
650 TABLET ORAL EVERY 6 HOURS PRN
Status: DISCONTINUED | OUTPATIENT
Start: 2019-09-25 | End: 2019-09-27 | Stop reason: HOSPADM

## 2019-09-25 RX ORDER — ACETAMINOPHEN 325 MG/1
650 TABLET ORAL ONCE
Status: COMPLETED | OUTPATIENT
Start: 2019-09-25 | End: 2019-09-25

## 2019-09-25 RX ADMIN — OXYBUTYNIN CHLORIDE 5 MG: 5 TABLET ORAL at 22:43

## 2019-09-25 RX ADMIN — SODIUM CHLORIDE 1000 ML: 0.9 INJECTION, SOLUTION INTRAVENOUS at 19:33

## 2019-09-25 RX ADMIN — HEPARIN SODIUM 5000 UNITS: 5000 INJECTION INTRAVENOUS; SUBCUTANEOUS at 22:43

## 2019-09-25 RX ADMIN — ACETAMINOPHEN 650 MG: 325 TABLET, FILM COATED ORAL at 19:33

## 2019-09-25 RX ADMIN — CEFTRIAXONE SODIUM 1000 MG: 10 INJECTION, POWDER, FOR SOLUTION INTRAVENOUS at 20:57

## 2019-09-25 RX ADMIN — SODIUM CHLORIDE, SODIUM GLUCONATE, SODIUM ACETATE, POTASSIUM CHLORIDE AND MAGNESIUM CHLORIDE 100 ML/HR: 526; 502; 368; 37; 30 INJECTION, SOLUTION INTRAVENOUS at 20:59

## 2019-09-25 NOTE — ED PROVIDER NOTES
History  Chief Complaint   Patient presents with    Fever - 76 years or older     Patient was seen here a few days ago for a uti and sent home on antibiotics  Patient states"today she is having dry mouth and fever"     61-year-old female presenting to the emergency department for evaluation of fever  Patient was seen here 2 days ago and diagnosed with urinary tract infection, started on Macrobid, since then has had generalized malaise, fatigue, dehydration and fevers as high as 102 at home  No worsening flank pain, no worsening urinary symptoms  No nausea or vomiting  Some decreased appetite  Prior to Admission Medications   Prescriptions Last Dose Informant Patient Reported? Taking?    amLODIPine (NORVASC) 5 mg tablet   No No   Sig: Take 1 tablet (5 mg total) by mouth daily for 180 days   aspirin (ECOTRIN LOW STRENGTH) 81 mg EC tablet   No No   Sig: Take 1 tablet (81 mg total) by mouth daily for 90 days   atorvastatin (LIPITOR) 10 mg tablet   No No   Sig: Take 1 tablet (10 mg total) by mouth daily for 90 days   lisinopril (ZESTRIL) 10 mg tablet   No No   Sig: Take 1 tablet (10 mg total) by mouth daily for 90 days   metFORMIN (GLUCOPHAGE) 500 mg tablet   No No   Sig: Take 1 tablet (500 mg total) by mouth 2 (two) times a day with meals for 90 days   nitrofurantoin (MACROBID) 100 mg capsule   No No   Sig: Take 1 capsule (100 mg total) by mouth 2 (two) times a day for 7 days   olopatadine (PATANOL) 0 1 % ophthalmic solution   No No   Sig: Administer 1 drop to both eyes 2 (two) times a day for 5 days   oxybutynin (DITROPAN) 5 mg tablet   No No   Sig: Take 1 tablet (5 mg total) by mouth 2 (two) times a day for 90 days      Facility-Administered Medications: None       Past Medical History:   Diagnosis Date    Diabetes mellitus ([address] )     Patient states "I quit my medication"    Eczema     Hypertension     Malignant neoplasm of skin     LAST ASSESSED: [id number]    Nonmelanoma skin cancer     LAST ASSESSED: [id number]    Renal disorder     stage 3       Past Surgical History:   Procedure Laterality Date    EYE SURGERY      catarcts removal both    SKIN BIOPSY      face and arm       Family History   Problem Relation Age of Onset    Diabetes Mother     Skin cancer Mother      I have reviewed and agree with the history as documented  Social History     Tobacco Use    Smoking status: Never Smoker    Smokeless tobacco: Never Used   Substance Use Topics    Alcohol use: No     Alcohol/week: 0 0 standard drinks     Frequency: Never     Drinks per session: 1 or 2     Binge frequency: Never    Drug use: No        Review of Systems   Constitutional: Positive for fatigue and fever  Negative for appetite change and chills  HENT: Negative for sneezing and sore throat  Eyes: Negative for visual disturbance  Respiratory: Negative for cough, choking, chest tightness, shortness of breath and wheezing  Cardiovascular: Negative for chest pain and palpitations  Gastrointestinal: Positive for abdominal pain  Negative for constipation, diarrhea, nausea and vomiting  Genitourinary: Negative for difficulty urinating and dysuria  Neurological: Negative for dizziness, weakness, light-headedness, numbness and headaches  All other systems reviewed and are negative  Physical Exam  Physical Exam   Constitutional: She is oriented to person, place, and time  She appears well-developed and well-nourished  No distress  Patient appears moderately dehydrated, with dry mucous membranes  HENT:   Head: Normocephalic and atraumatic  Mouth/Throat: Oropharynx is clear and moist    Eyes: Pupils are equal, round, and reactive to light  EOM are normal    Neck: No JVD present  No tracheal deviation present  Cardiovascular: Normal rate, regular rhythm, normal heart sounds and intact distal pulses  Exam reveals no gallop and no friction rub  No murmur heard    Pulmonary/Chest: Effort normal and breath sounds normal  No respiratory distress  She has no wheezes  She has no rales  Abdominal: Soft  Bowel sounds are normal  She exhibits no distension  There is no tenderness  There is no rebound and no guarding  Neurological: She is alert and oriented to person, place, and time  No cranial nerve deficit  She exhibits normal muscle tone  Skin: Skin is warm and dry  She is not diaphoretic  No pallor  Psychiatric: She has a normal mood and affect  Her behavior is normal    Nursing note and vitals reviewed        Vital Signs  ED Triage Vitals [09/25/19 1815]   Temperature Pulse Respirations Blood Pressure SpO2   98 7 °F (37 1 °C) 89 18 117/75 95 %      Temp Source Heart Rate Source Patient Position - Orthostatic VS BP Location FiO2 (%)   Oral Monitor -- -- --      Pain Score       No Pain           Vitals:    09/25/19 1815   BP: 117/75   Pulse: 89         Visual Acuity      ED Medications  Medications   sodium chloride 0 9 % bolus 1,000 mL (1,000 mL Intravenous New Bag 9/25/19 1933)   ceftriaxone (ROCEPHIN) 1 g/50 mL in dextrose IVPB (has no administration in time range)   multi-electrolyte (PLASMALYTE-A/ISOLYTE-S PH 7 4) IV solution (has no administration in time range)   acetaminophen (TYLENOL) tablet 650 mg (650 mg Oral Given 9/25/19 1933)       Diagnostic Studies  Results Reviewed     Procedure Component Value Units Date/Time    Comprehensive metabolic panel [[id number]]  (Abnormal) Collected:  09/25/19 1918    Lab Status:  Final result Specimen:  Blood from Arm, Left Updated:  09/25/19 1950     Sodium 135 mmol/L      Potassium 3 6 mmol/L      Chloride 99 mmol/L      CO2 27 mmol/L      ANION GAP 9 mmol/L      BUN 15 mg/dL      Creatinine 1 44 mg/dL      Glucose 156 mg/dL      Calcium 10 0 mg/dL      AST 22 U/L      ALT 29 U/L      Alkaline Phosphatase 96 U/L      Total Protein 7 5 g/dL      Albumin 3 4 g/dL      Total Bilirubin 0 60 mg/dL      eGFR 35 ml/min/1 73sq m     Narrative:       National Kidney Disease Foundation guidelines for Chronic Kidney Disease (CKD):     Stage 1 with normal or high GFR (GFR > 90 mL/min/1 73 square meters)    Stage 2 Mild CKD (GFR = 60-89 mL/min/1 73 square meters)    Stage 3A Moderate CKD (GFR = 45-59 mL/min/1 73 square meters)    Stage 3B Moderate CKD (GFR = 30-44 mL/min/1 73 square meters)    Stage 4 Severe CKD (GFR = 15-29 mL/min/1 73 square meters)    Stage 5 End Stage CKD (GFR <15 mL/min/1 73 square meters)  Note: GFR calculation is accurate only with a steady state creatinine    CBC and differential [[id number]]  (Abnormal) Collected:  09/25/19 1918    Lab Status:  Final result Specimen:  Blood from Arm, Left Updated:  09/25/19 1930     WBC 11 52 Thousand/uL      RBC 4 02 Million/uL      Hemoglobin 12 8 g/dL      Hematocrit 39 3 %      MCV 98 fL      MCH 31 8 pg      MCHC 32 6 g/dL      RDW 13 2 %      MPV 11 0 fL      Platelets 906 Thousands/uL      nRBC 0 /100 WBCs      Neutrophils Relative 85 %      Immat GRANS % 0 %      Lymphocytes Relative 5 %      Monocytes Relative 5 %      Eosinophils Relative 5 %      Basophils Relative 0 %      Neutrophils Absolute 9 73 Thousands/µL      Immature Grans Absolute 0 04 Thousand/uL      Lymphocytes Absolute 0 57 Thousands/µL      Monocytes Absolute 0 59 Thousand/µL      Eosinophils Absolute 0 55 Thousand/µL      Basophils Absolute 0 04 Thousands/µL     Blood culture #1 [[id number]] Collected:  09/25/19 1918    Lab Status: In process Specimen:  Blood from Arm, Left Updated:  09/25/19 1926    Blood culture #2 [[id number]] Collected:  09/25/19 1918    Lab Status:   In process Specimen:  Blood from Arm, Right Updated:  09/25/19 1926    UA w Reflex to Microscopic w Reflex to Culture [[id number]]     Lab Status:  No result Specimen:  Urine                  No orders to display              Procedures  Procedures       ED Course                               MDM  Number of Diagnoses or Management Options  UTI (urinary tract infection):   Diagnosis management comments: 75-year-old female with possibly worsening urinary tract infection, will check labs, urine, give fluids antibiotics and likely admit  Disposition  Final diagnoses:   UTI (urinary tract infection)     Time reflects when diagnosis was documented in both MDM as applicable and the Disposition within this note     Time User Action Codes Description Comment    9/25/2019  8:36 PM Karri Anderson Add [N39 0] UTI (urinary tract infection)       ED Disposition     ED Disposition Condition Date/Time Comment    Admit Stable Wed Sep 25, 2019  8:36 PM Case was discussed with LOUIE and the patient's admission status was agreed to be Admission Status: inpatient status to the service of Dr Pereira Other   Follow-up Information    None         Patient's Medications   Discharge Prescriptions    No medications on file     No discharge procedures on file      ED Provider  Electronically Signed by           Blade Burt MD  09/25/19 4782

## 2019-09-26 LAB
ANION GAP SERPL CALCULATED.3IONS-SCNC: 10 MMOL/L (ref 4–13)
BUN SERPL-MCNC: 19 MG/DL (ref 5–25)
CALCIUM SERPL-MCNC: 9 MG/DL (ref 8.3–10.1)
CHLORIDE SERPL-SCNC: 107 MMOL/L (ref 100–108)
CO2 SERPL-SCNC: 25 MMOL/L (ref 21–32)
CREAT SERPL-MCNC: 1.06 MG/DL (ref 0.6–1.3)
ERYTHROCYTE [DISTWIDTH] IN BLOOD BY AUTOMATED COUNT: 13.3 % (ref 11.6–15.1)
EST. AVERAGE GLUCOSE BLD GHB EST-MCNC: 128 MG/DL
GFR SERPL CREATININE-BSD FRML MDRD: 51 ML/MIN/1.73SQ M
GLUCOSE SERPL-MCNC: 109 MG/DL (ref 65–140)
GLUCOSE SERPL-MCNC: 114 MG/DL (ref 65–140)
GLUCOSE SERPL-MCNC: 116 MG/DL (ref 65–140)
GLUCOSE SERPL-MCNC: 119 MG/DL (ref 65–140)
GLUCOSE SERPL-MCNC: 134 MG/DL (ref 65–140)
HBA1C MFR BLD: 6.1 % (ref 4.2–6.3)
HCT VFR BLD AUTO: 34 % (ref 34.8–46.1)
HGB BLD-MCNC: 11.3 G/DL (ref 11.5–15.4)
MAGNESIUM SERPL-MCNC: 1.8 MG/DL (ref 1.6–2.6)
MCH RBC QN AUTO: 32.2 PG (ref 26.8–34.3)
MCHC RBC AUTO-ENTMCNC: 33.2 G/DL (ref 31.4–37.4)
MCV RBC AUTO: 97 FL (ref 82–98)
PHOSPHATE SERPL-MCNC: 3.1 MG/DL (ref 2.3–4.1)
PLATELET # BLD AUTO: 200 THOUSANDS/UL (ref 149–390)
PMV BLD AUTO: 11.6 FL (ref 8.9–12.7)
POTASSIUM SERPL-SCNC: 3.5 MMOL/L (ref 3.5–5.3)
RBC # BLD AUTO: 3.51 MILLION/UL (ref 3.81–5.12)
SODIUM SERPL-SCNC: 142 MMOL/L (ref 136–145)
WBC # BLD AUTO: 7.68 THOUSAND/UL (ref 4.31–10.16)

## 2019-09-26 PROCEDURE — 83036 HEMOGLOBIN GLYCOSYLATED A1C: CPT | Performed by: INTERNAL MEDICINE

## 2019-09-26 PROCEDURE — 80048 BASIC METABOLIC PNL TOTAL CA: CPT | Performed by: NURSE PRACTITIONER

## 2019-09-26 PROCEDURE — 99232 SBSQ HOSP IP/OBS MODERATE 35: CPT | Performed by: NURSE PRACTITIONER

## 2019-09-26 PROCEDURE — 83735 ASSAY OF MAGNESIUM: CPT | Performed by: NURSE PRACTITIONER

## 2019-09-26 PROCEDURE — 84100 ASSAY OF PHOSPHORUS: CPT | Performed by: NURSE PRACTITIONER

## 2019-09-26 PROCEDURE — 85027 COMPLETE CBC AUTOMATED: CPT | Performed by: NURSE PRACTITIONER

## 2019-09-26 PROCEDURE — 82948 REAGENT STRIP/BLOOD GLUCOSE: CPT

## 2019-09-26 RX ADMIN — HEPARIN SODIUM 5000 UNITS: 5000 INJECTION INTRAVENOUS; SUBCUTANEOUS at 05:11

## 2019-09-26 RX ADMIN — OXYBUTYNIN CHLORIDE 5 MG: 5 TABLET ORAL at 21:08

## 2019-09-26 RX ADMIN — OXYBUTYNIN CHLORIDE 5 MG: 5 TABLET ORAL at 16:31

## 2019-09-26 RX ADMIN — ATORVASTATIN CALCIUM 10 MG: 10 TABLET, FILM COATED ORAL at 09:39

## 2019-09-26 RX ADMIN — ASPIRIN 81 MG: 81 TABLET, COATED ORAL at 09:39

## 2019-09-26 RX ADMIN — HEPARIN SODIUM 5000 UNITS: 5000 INJECTION INTRAVENOUS; SUBCUTANEOUS at 14:34

## 2019-09-26 RX ADMIN — SODIUM CHLORIDE, SODIUM GLUCONATE, SODIUM ACETATE, POTASSIUM CHLORIDE AND MAGNESIUM CHLORIDE 100 ML/HR: 526; 502; 368; 37; 30 INJECTION, SOLUTION INTRAVENOUS at 04:53

## 2019-09-26 RX ADMIN — HEPARIN SODIUM 5000 UNITS: 5000 INJECTION INTRAVENOUS; SUBCUTANEOUS at 21:08

## 2019-09-26 RX ADMIN — SODIUM CHLORIDE, SODIUM GLUCONATE, SODIUM ACETATE, POTASSIUM CHLORIDE AND MAGNESIUM CHLORIDE 100 ML/HR: 526; 502; 368; 37; 30 INJECTION, SOLUTION INTRAVENOUS at 14:34

## 2019-09-26 RX ADMIN — AMLODIPINE BESYLATE 5 MG: 5 TABLET ORAL at 09:39

## 2019-09-26 RX ADMIN — CEFTRIAXONE SODIUM 1000 MG: 10 INJECTION, POWDER, FOR SOLUTION INTRAVENOUS at 21:08

## 2019-09-26 RX ADMIN — OXYBUTYNIN CHLORIDE 5 MG: 5 TABLET ORAL at 09:39

## 2019-09-26 NOTE — ASSESSMENT & PLAN NOTE
Lab Results   Component Value Date    HGBA1C 6 1 09/26/2019       Recent Labs     09/25/19  2243 09/26/19  0608 09/26/19  1144   POCGLU 130 116 134       Blood Sugar Average: Last 72 hrs:  (P) 908 [id number]   · Patient reports that she does not check her blood sugars at home because she can't afford the testing strips, will discuss case management  · Discontinue metformin while inpatient  · Carb controlled diet, fingersticks, sliding scale

## 2019-09-26 NOTE — UTILIZATION REVIEW
Initial Clinical Review    Admission: Date/Time/Statement: Inpatient Admission Orders (From admission, onward)     Ordered        09/25/19 2037  Inpatient Admission  Once                   Orders Placed This Encounter   Procedures    Inpatient Admission     Standing Status:   Standing     Number of Occurrences:   1     Order Specific Question:   Admitting Physician     Answer:   Olivia Harmon [[id number]]     Order Specific Question:   Level of Care     Answer:   Med Surg [[id number]]     Order Specific Question:   Estimated length of stay     Answer:   More than 2 Midnights     Order Specific Question:   Certification     Answer:   I certify that inpatient services are medically necessary for this patient for a duration of greater than two midnights  See H&P and MD Progress Notes for additional information about the patient's course of treatment  ED Arrival Information     Expected Arrival Acuity Means of Arrival Escorted By Service Admission Type    - 9/25/2019 17:55 Urgent Walk-In Family Member General Medicine Urgent    Arrival Complaint    fever         Chief Complaint   Patient presents with    Fever - 76 years or older     Patient was seen here a few days ago for a uti and sent home on antibiotics  Patient states"today she is having dry mouth and fever"     Assessment/Plan: 66 y o  female who presents with worsening fever  Patient presented to the ED 2 days ago and was told she had a UTI she was discharged on antibiotics  She has been taking antibiotics as prescribed however today she reports that her fever returned and she had a T-max at home of 102  She has remained afebrile since admission  She complains of lower abdominal pain and frequency as well as some dizziness, and weakness  She denies any chest pain chest tightness shortness of breath or difficulty breathing  She denies any respiratory symptoms  She was in a private residence with her daughter  She does not smoke drink or use illicit drugs  She does not report any other complaints at this time   UTI (urinary tract infection)  Assessment & Plan  Patient presented to the ED 2 days ago with positive UA was discharged with antibiotics has been taking antibiotics as prescribed however reports that her fever returned today and she has had persistent symptoms  Patient was also found to have elevated creatinine, will hold off on nephrology consult at this time, BMP in a m  Does not meet sepsis or SIRS criteria  New mild leukocytosis of 11 5  IV Rocephin  IV fluids  Prn Tylenol  Supportive care   Fever  Assessment & Plan  Patient reports subjective fever  T-max of 1 no to this morning  Has remained afebrile since arrival  Prn Tylenol  Continue IV antibiotics for treatment of UTI  Supportive care   CKD (chronic kidney disease) stage 3, GFR 30-59 ml/min ([facility])  Assessment & Plan  Baseline creatinine appears to be 1 0-1 1  1 44 on admission  IV fluids  BMP in a m    Essential hypertension  Assessment & Plan  Appears controlled on review  Continue home medications   Type 2 diabetes mellitus without complication, without long-term current use of insulin ([facility])  Assessment & Plan        Lab Results   Component Value Date     HGBA1C 6 7 03/12/2019      No results for input(s): POCGLU in the last 72 hours      Blood Sugar Average: Last 72 hrs:   patient reports that she does not check her blood sugars at home because she can't afford the testing strips, will discuss case management  Discontinue metformin while inpatient  Carb controlled diet, fingersticks, sliding scale     VTE Prophylaxis: Heparin  / sequential compression device   Code Status:  Full code  POLST: POLST form is not discussed and not completed at this time  Discussion with family:  Not available at this time   Anticipated Length of Stay:  Patient will be admitted on an Inpatient basis with an anticipated length of stay of  > 2 midnights     Justification for Hospital Stay:  IV antibiotics, IV fluids, monitoring of a KI    ED Triage Vitals   Temperature Pulse Respirations Blood Pressure SpO2   09/25/19 1815 09/25/19 1815 09/25/19 1815 09/25/19 1815 09/25/19 1815   98 7 °F (37 1 °C) 89 18 117/75 95 %      Temp Source Heart Rate Source Patient Position - Orthostatic VS BP Location FiO2 (%)   09/25/19 1815 09/25/19 1815 09/25/19 2056 09/25/19 2056 --   Oral Monitor Sitting Right arm       Pain Score       09/25/19 1815       No Pain        Wt Readings from Last 1 Encounters:   09/25/19 54 9 kg (121 lb)     Additional Vital Signs:   0737  97 7 °F (36 5 °C)  75  19  124/68  --  99 %  None (Room air)  Lying   09/25/19 2226  97 6 °F (36 4 °C)  76  16  122/58  83  94 %  None (Room air)  Lying   09/25/19 2056  --  71  17  112/56  --  96 %  None (Room air)  Sitting       Pertinent Labs/Diagnostic Test Results:   Results from last 7 days   Lab Units 09/26/19  0507 09/25/19 1918 09/23/19  1443   WBC Thousand/uL 7 68 11 52* 7 10   HEMOGLOBIN g/dL 11 3* 12 8 13 7   HEMATOCRIT % 34 0* 39 3 42 4   PLATELETS Thousands/uL 200 238 283   NEUTROS ABS Thousands/µL  --  9 73* 4 06     Results from last 7 days   Lab Units 09/26/19  0507 09/25/19 1918 09/23/19  1443   SODIUM mmol/L 142 135* 141   POTASSIUM mmol/L 3 5 3 6 4 2   CHLORIDE mmol/L 107 99* 101   CO2 mmol/L 25 27 29   ANION GAP mmol/L 10 9 11   BUN mg/dL 19 15 15   CREATININE mg/dL 1 06 1 44* 1 08   EGFR ml/min/1 73sq m 51 35 50   CALCIUM mg/dL 9 0 10 0 10 2*   MAGNESIUM mg/dL 1 8  --   --    PHOSPHORUS mg/dL 3 1  --   --      Results from last 7 days   Lab Units 09/25/19 1918 09/23/19  1443   AST U/L 22 15   ALT U/L 29 19   ALK PHOS U/L 96 83   TOTAL PROTEIN g/dL 7 5 8 1   ALBUMIN g/dL 3 4* 3 8   TOTAL BILIRUBIN mg/dL 0 60 0 40     Results from last 7 days   Lab Units 09/26/19  0608 09/25/19  2243   POC GLUCOSE mg/dl 116 130     Results from last 7 days   Lab Units 09/26/19  0507 09/25/19  1918 09/23/19  1443   GLUCOSE RANDOM mg/dL 119 156* 110       Results from last 7 days   Lab Units 09/26/19  0507   HEMOGLOBIN A1C % 6 1   EAG mg/dl 128     Results from last 7 days   Lab Units 09/25/19  2218 09/23/19  1439   CLARITY UA  Slightly Cloudy Clear   COLOR UA  Yellow Yellow   SPEC GRAV UA  <=1 005 <=1 005   PH UA  6 0 5 5   GLUCOSE UA mg/dl Negative Negative   KETONES UA mg/dl Negative Negative   BLOOD UA  Negative Negative   PROTEIN UA mg/dl Negative Negative   NITRITE UA  Negative Negative   BILIRUBIN UA  Negative Negative   UROBILINOGEN UA E U /dl 0 2 0 2   LEUKOCYTES UA  Moderate* Small*   WBC UA /hpf 4-10* 2-4*   RBC UA /hpf None Seen None Seen   BACTERIA UA /hpf Occasional Occasional   EPITHELIAL CELLS WET PREP /hpf Occasional Occasional     ED Treatment:   Medication Administration from 09/25/2019 1755 to 09/25/2019 2225       Date/Time Order Dose Route Action     09/25/2019 1933 sodium chloride 0 9 % bolus 1,000 mL 1,000 mL Intravenous New Bag     09/25/2019 1933 acetaminophen (TYLENOL) tablet 650 mg 650 mg Oral Given     09/25/2019 2057 ceftriaxone (ROCEPHIN) 1 g/50 mL in dextrose IVPB 1,000 mg Intravenous New Bag     09/25/2019 2059 multi-electrolyte (PLASMALYTE-A/ISOLYTE-S PH 7 4) IV solution 100 mL/hr Intravenous New Bag        Past Medical History:   Diagnosis Date    Diabetes mellitus ([facility] [address] )     Patient states "I quit my medication"    Eczema     Hypertension     Malignant neoplasm of skin     LAST ASSESSED: [id number]    Nonmelanoma skin cancer     LAST ASSESSED: [id number]    Renal disorder     stage 3     Present on Admission:   Fever   CKD (chronic kidney disease) stage 3, GFR 30-59 ml/min ([facility])   Essential hypertension   UTI (urinary tract infection)   Type 2 diabetes mellitus without complication, without long-term current use of insulin ([facility])      Admitting Diagnosis: UTI (urinary tract infection) [N39 0]  Fever [R50 9]  Age/Sex: 66 y o  female  Admission Orders:    Current Facility-Administered Medications:  acetaminophen 650 mg Oral Q6H PRN     amLODIPine 5 mg Oral Daily     aspirin 81 mg Oral Daily     atorvastatin 10 mg Oral Daily     calcium carbonate 1,000 mg Oral Daily PRN     cefTRIAXone 1,000 mg Intravenous Once     heparin (porcine) 5,000 Units Subcutaneous Q8H [address]     insulin lispro 1-5 Units Subcutaneous TID AC     insulin lispro 1-5 Units Subcutaneous HS     multi-electrolyte 100 mL/hr Intravenous Continuous     ondansetron 4 mg Intravenous Q6H PRN     oxybutynin 5 mg Oral TID       Fingerstick ac and hs   Cons carb diet   Up and OOB as samson   Bladder scan   I&O       Network Utilization Review Department  Phone: [phone]; Fax [phone]  [email or web address]  ATTENTION: Please call with any questions or concerns to [phone]  and carefully listen to the prompts so that you are directed to the right person  Send all requests for admission clinical reviews, approved or denied determinations and any other requests to fax [phone]   All voicemails are confidential

## 2019-09-26 NOTE — PROGRESS NOTES
Progress Note - Ashlee Salcedo 1945, 66 y o  female MRN: [id number]    Unit/Bed#: -Alanna Encounter: [id number]    Primary Care Provider: MEMO Shay   Date and time admitted to hospital: 9/25/2019  6:54 PM        * UTI (urinary tract infection)  Assessment & Plan  · Background: Patient presented to the ED 2 days ago with positive UA was discharged with antibiotics has been taking antibiotics as prescribed however reports that her fever returned today and she has had persistent symptoms  · Creatinine trended down status post IV fluids  Will continue to hold off on nephrology consult  · Does not meet sepsis or SIRS criteria  · Mild leukocytosis on admission, but since resolved  · Continue IV Rocephin; waiting on urine culture for sensitivities for appropriate discharge antibiotics  · Continue IV fluids  · Prn Tylenol for pain and fever  · On exam patient denies fever, body aches, chills, nausea, vomiting, and diaphoresis  Patient does not endorse having any urinary symptoms  · Urine culture pending  · Blood cultures pending  · Supportive care    Fever  Assessment & Plan  · Patient reports subjective fever at home  · Afebrile since admission  · Prn Tylenol  · Continue IV antibiotics for treatment of UTI  · Urine culture ordered will narrow coverage of antibiotics pending results  · Supportive care    CKD (chronic kidney disease) stage 3, GFR 30-59 ml/min ([facility])  Assessment & Plan  · Baseline creatinine appears to be 1 0-1 1  · 1 44 on admission but has since resolved with creatinine 1 06  · Continue IV fluids   · Repeat BMP in a m      Essential hypertension  Assessment & Plan  · Continues to be well controlled since admission  · Continue home medications    Type 2 diabetes mellitus without complication, without long-term current use of insulin [facility])  Assessment & Plan  Lab Results   Component Value Date    HGBA1C 6 1 09/26/2019       Recent Labs     09/25/19  2243 09/26/19  0608 09/26/19  1144 POCGLU 130 116 134       Blood Sugar Average: Last 72 hrs:  (P) 288 [id number]   · Patient reports that she does not check her blood sugars at home because she can't afford the testing strips, will discuss case management  · Discontinue metformin while inpatient  · Carb controlled diet, fingersticks, sliding scale      VTE Pharmacologic Prophylaxis:   Pharmacologic: Heparin  Mechanical VTE Prophylaxis in Place: Yes    Patient Centered Rounds: I have performed bedside rounds with nursing staff today  Discussions with Specialists or Other Care Team Provider:  Case management    Education and Discussions with Family / Patient:  Discussed the plan of care and utility of cultures in order to narrow coverage of antibiotics  Answered questions to the best my ability    Time Spent for Care: 30 minutes  More than 50% of total time spent on counseling and coordination of care as described above  Current Length of Stay: 1 day(s)    Current Patient Status: Inpatient   Certification Statement: The patient will continue to require additional inpatient hospital stay due to IV antibiotics    Discharge Plan:  Pending urine culture results    Code Status: Level 1 - Full Code      Subjective:   Patient does not endorse having any complaints at this time    Objective:     Vitals:   Temp (24hrs), Av °F (36 7 °C), Min:97 6 °F (36 4 °C), Max:98 7 °F (37 1 °C)    Temp:  [97 6 °F (36 4 °C)-98 7 °F (37 1 °C)] 97 7 °F (36 5 °C)  HR:  [71-89] 75  Resp:  [16-19] 19  BP: (112-124)/(56-75) 124/68  SpO2:  [94 %-99 %] 99 %  Body mass index is 22 86 kg/m²  Input and Output Summary (last 24 hours): Intake/Output Summary (Last 24 hours) at 2019 1347  Last data filed at 2019 0701  Gross per 24 hour   Intake 360 ml   Output 1600 ml   Net -1240 ml       Physical Exam:     Physical Exam   Constitutional: She appears well-nourished  She is active and cooperative     Cardiovascular: Normal heart sounds and intact distal pulses  No murmur heard  Pulses:       Radial pulses are 2+ on the right side  Dorsalis pedis pulses are 2+ on the right side  Posterior tibial pulses are 2+ on the right side  No lower extremity edema noted   Pulmonary/Chest: Effort normal and breath sounds normal  No respiratory distress  She has no wheezes  Abdominal: Soft  Bowel sounds are normal  She exhibits no distension  Neurological: She is alert  Skin: Skin is warm and dry  Capillary refill takes less than 2 seconds  No erythema  No pallor  Psychiatric: She has a normal mood and affect  Her behavior is normal  Judgment normal          Additional Data:     Labs:    Results from last 7 days   Lab Units 09/26/19  0507 09/25/19 1918   WBC Thousand/uL 7 68 11 52*   HEMOGLOBIN g/dL 11 3* 12 8   HEMATOCRIT % 34 0* 39 3   PLATELETS Thousands/uL 200 238   NEUTROS PCT %  --  85*   LYMPHS PCT %  --  5*   MONOS PCT %  --  5   EOS PCT %  --  5     Results from last 7 days   Lab Units 09/26/19  0507 09/25/19  1918   SODIUM mmol/L 142 135*   POTASSIUM mmol/L 3 5 3 6   CHLORIDE mmol/L 107 99*   CO2 mmol/L 25 27   BUN mg/dL 19 15   CREATININE mg/dL 1 06 1 44*   ANION GAP mmol/L 10 9   CALCIUM mg/dL 9 0 10 0   ALBUMIN g/dL  --  3 4*   TOTAL BILIRUBIN mg/dL  --  0 60   ALK PHOS U/L  --  96   ALT U/L  --  29   AST U/L  --  22   GLUCOSE RANDOM mg/dL 119 156*         Results from last 7 days   Lab Units 09/26/19  1144 09/26/19  0608 09/25/19  2243   POC GLUCOSE mg/dl 134 116 130     Results from last 7 days   Lab Units 09/26/19  0507   HEMOGLOBIN A1C % 6 1               * I Have Reviewed All Lab Data Listed Above  * Additional Pertinent Lab Tests Reviewed:  [address] Admission Reviewed    Imaging:    Imaging Reports Reviewed Today Include:  No images for review  Imaging Personally Reviewed by Myself Includes:  None    Recent Cultures (last 7 days):           Last 24 Hours Medication List:     Current Facility-Administered Medications:  acetaminophen 650 mg Oral Q6H PRN [facility], CRNP    amLODIPine 5 mg Oral Daily Jayna [facility], CRNP    aspirin 81 mg Oral Daily Jayna [facility], CRNP    atorvastatin 10 mg Oral Daily Jayna [facility], CRNP    calcium carbonate 1,000 mg Oral Daily PRN [facility], CRNP    cefTRIAXone 1,000 mg Intravenous Once [facility], [facility]    heparin (porcine) 5,000 Units Subcutaneous Q8H [address] MEMO Mauricio    insulin lispro 1-5 Units Subcutaneous TID AC MEMO Mauricio    insulin lispro 1-5 Units Subcutaneous HS [facility], CR[facility]    multi-electrolyte 100 mL/hr Intravenous Continuous Walter SimoninsDO Last Rate: 100 mL/hr (09/26/19 3953)   ondansetron 4 mg Intravenous Q6H [facility]N [facility], [facility]    oxybutynin 5 mg Oral TID [facility], MEMO         Today, Patient Was Seen By: MEMO Almazan    ** Please Note: Dictation voice to text software may have been used in the creation of this document   **

## 2019-09-26 NOTE — ASSESSMENT & PLAN NOTE
· Patient reports subjective fever at home  · Afebrile since admission  · Prn Tylenol  · Continue IV antibiotics for treatment of UTI  · Urine culture ordered will narrow coverage of antibiotics pending results  · Supportive care

## 2019-09-26 NOTE — ASSESSMENT & PLAN NOTE
Lab Results   Component Value Date    HGBA1C 6 7 03/12/2019       No results for input(s): POCGLU in the last 72 hours      Blood Sugar Average: Last 72 hrs:   patient reports that she does not check her blood sugars at home because she can't afford the testing strips, will discuss case management  Discontinue metformin while inpatient  Carb controlled diet, fingersticks, sliding scale

## 2019-09-26 NOTE — ASSESSMENT & PLAN NOTE
Patient presented to the ED 2 days ago with positive UA was discharged with antibiotics has been taking antibiotics as prescribed however reports that her fever returned today and she has had persistent symptoms  Patient was also found to have elevated creatinine, will hold off on nephrology consult at this time, BMP in a m    Does not meet sepsis or SIRS criteria  New mild leukocytosis of 11 5  IV Rocephin  IV fluids  Prn Tylenol  Supportive care

## 2019-09-26 NOTE — ASSESSMENT & PLAN NOTE
· Baseline creatinine appears to be 1 0-1 1  · 1 44 on admission but has since resolved with creatinine 1 06  · Continue IV fluids   · Repeat BMP in a m

## 2019-09-26 NOTE — ASSESSMENT & PLAN NOTE
· Background: Patient presented to the ED 2 days ago with positive UA was discharged with antibiotics has been taking antibiotics as prescribed however reports that her fever returned today and she has had persistent symptoms  · Creatinine trended down status post IV fluids  Will continue to hold off on nephrology consult  · Does not meet sepsis or SIRS criteria  · Mild leukocytosis on admission, but since resolved  · Continue IV Rocephin; waiting on urine culture for sensitivities for appropriate discharge antibiotics  · Continue IV fluids  · Prn Tylenol for pain and fever  · On exam patient denies fever, body aches, chills, nausea, vomiting, and diaphoresis  Patient does not endorse having any urinary symptoms     · Urine culture pending  · Blood cultures pending  · Supportive care

## 2019-09-26 NOTE — ED NOTES
1  Chief complaint- UTI/fever  2  Is this admission due to an injury? no  3  Orientation status A/Ox4  4  Abnormal labs, tests, vitals- creat 1 44  5  Important drips- NSS fluid bolus, Isolyte 100ml/hr  6  Time of last narcotics- N/A  7  IV lines, drains, tubes- 20L AC, 20 RAC  8  Isolation status- N/A  9  Skin check- unremarkable  10  Ambulation status ambulates to self  11   ED RN phone number [phone] Wolfgang Zapata, RN  09/25/19 7541

## 2019-09-26 NOTE — H&P
[address] Internal Medicine  H&P- Maxine White 1945, 66 y o  female MRN: [id number]    Unit/Bed#: ED 28 Encounter: [id number]    Primary Care Provider: MEMO Farrell   Date and time admitted to hospital: 9/25/2019  6:54 PM    * UTI (urinary tract infection)  Assessment & Plan  Patient presented to the ED 2 days ago with positive UA was discharged with antibiotics has been taking antibiotics as prescribed however reports that her fever returned today and she has had persistent symptoms  Patient was also found to have elevated creatinine, will hold off on nephrology consult at this time, BMP in a m  Does not meet sepsis or SIRS criteria  New mild leukocytosis of 11 5  IV Rocephin  IV fluids  Prn Tylenol  Supportive care    Fever  Assessment & Plan  Patient reports subjective fever  T-max of 1 no to this morning  Has remained afebrile since arrival  Prn Tylenol  Continue IV antibiotics for treatment of UTI  Supportive care    CKD (chronic kidney disease) stage 3, GFR 30-59 ml/min ([facility])  Assessment & Plan  Baseline creatinine appears to be 1 0-1 1  1 44 on admission  IV fluids  BMP in a m  Essential hypertension  Assessment & Plan  Appears controlled on review  Continue home medications    Type 2 diabetes mellitus without complication, without long-term current use of insulin ([facility])  Assessment & Plan  Lab Results   Component Value Date    HGBA1C 6 7 03/12/2019       No results for input(s): POCGLU in the last 72 hours  Blood Sugar Average: Last 72 hrs:   patient reports that she does not check her blood sugars at home because she can't afford the testing strips, will discuss case management  Discontinue metformin while inpatient  Carb controlled diet, fingersticks, sliding scale         VTE Prophylaxis: Heparin  / sequential compression device   Code Status:  Full code  POLST: POLST form is not discussed and not completed at this time    Discussion with family:  Not available at this time    Anticipated Length of Stay:  Patient will be admitted on an Inpatient basis with an anticipated length of stay of  > 2 midnights  Justification for Hospital Stay:  IV antibiotics, IV fluids, monitoring of a KI    Total Time for Visit, including Counseling / Coordination of Care: 1 hour  Greater than 50% of this total time spent on direct patient counseling and coordination of care  Chief Complaint:   Fever    History of Present Illness:    Sara Carty is a 66 y o  female who presents with worsening fever  Patient presented to the ED 2 days ago and was told she had a UTI she was discharged on antibiotics  She has been taking antibiotics as prescribed however today she reports that her fever returned and she had a T-max at home of 102  She has remained afebrile since admission  She complains of lower abdominal pain and frequency as well as some dizziness, and weakness  She denies any chest pain chest tightness shortness of breath or difficulty breathing  She denies any respiratory symptoms  She was in a private residence with her daughter  She does not smoke drink or use illicit drugs  She does not report any other complaints at this time    Review of Systems:    Review of Systems   Constitutional: Positive for chills, fatigue and fever  HENT: Negative  Eyes: Negative  Respiratory: Negative  Cardiovascular: Negative  Gastrointestinal: Negative  Endocrine: Negative  Genitourinary: Positive for frequency and pelvic pain  Musculoskeletal: Negative  Allergic/Immunologic: Negative  Neurological: Negative  Hematological: Negative  Psychiatric/Behavioral: Negative  All other systems reviewed and are negative        Past Medical and Surgical History:     Past Medical History:   Diagnosis Date    Diabetes mellitus ([facility] [address] )     Patient states "I quit my medication"    Eczema     Hypertension     Malignant neoplasm of skin     LAST ASSESSED: [id number]    Nonmelanoma skin cancer     LAST ASSESSED: [id number]    Renal disorder     stage 3       Past Surgical History:   Procedure Laterality Date    EYE SURGERY      catarcts removal both    SKIN BIOPSY      face and arm       Meds/Allergies:    Prior to Admission medications    Medication Sig Start Date End Date Taking? Authorizing Provider   amLODIPine (NORVASC) 5 mg tablet Take 1 tablet (5 mg total) by mouth daily for 180 days 2/6/19 9/23/19  MEMO Osman   aspirin (ECOTRIN LOW STRENGTH) 81 mg EC tablet Take 1 tablet (81 mg total) by mouth daily for 90 days 6/6/19 9/23/19  MEMO Osman   atorvastatin (LIPITOR) 10 mg tablet Take 1 tablet (10 mg total) by mouth daily for 90 days 3/20/19 9/23/19  MEMO Osman   lisinopril (ZESTRIL) 10 mg tablet Take 1 tablet (10 mg total) by mouth daily for 90 days 6/6/19 9/23/19  MEMO Osman   metFORMIN (GLUCOPHAGE) 500 mg tablet Take 1 tablet (500 mg total) by mouth 2 (two) times a day with meals for 90 days 6/6/19 9/23/19  MEMO Osman   nitrofurantoin (MACROBID) 100 mg capsule Take 1 capsule (100 mg total) by mouth 2 (two) times a day for 7 days 9/23/19 9/30/19  Benny Chew PA-C   olopatadine (PATANOL) 0 1 % ophthalmic solution Administer 1 drop to both eyes 2 (two) times a day for 5 days 11/20/18 9/23/19  MEMO Osman   oxybutynin (DITROPAN) 5 mg tablet Take 1 tablet (5 mg total) by mouth 2 (two) times a day for 90 days 6/25/19 9/23/19  MEMO Osman     I have reviewed home medications with patient personally  Allergies: Allergies   Allergen Reactions    Latex Rash    Penicillin G Rash       Social History:     Marital Status:     Occupation:  Retired  Patient Pre-hospital Living Situation:  Private residence  Patient Pre-hospital Level of Mobility:  Independent  Patient Pre-hospital Diet Restrictions:  None  Substance Use History:   Social History     Substance and Sexual Activity   Alcohol Use No    Alcohol/week: 0 0 standard drinks    Frequency: Never    Drinks per session: 1 or 2    Binge frequency: Never     Social History     Tobacco Use   Smoking Status Never Smoker   Smokeless Tobacco Never Used     Social History     Substance and Sexual Activity   Drug Use No       Family History:    Family History   Problem Relation Age of Onset    Diabetes Mother     Skin cancer Mother        Physical Exam:     Vitals:   Blood Pressure: 112/56 (09/25/19 2056)  Pulse: 71 (09/25/19 2056)  Temperature: 98 7 °F (37 1 °C) (09/25/19 1815)  Temp Source: Oral (09/25/19 1815)  Respirations: 17 (09/25/19 2056)  Height: 5' 1" (154 9 cm) (09/25/19 1815)  Weight - Scale: 54 9 kg (121 lb) (09/25/19 1815)  SpO2: 96 % (09/25/19 2056)    Physical Exam   Constitutional: She appears well-developed and well-nourished  She is active and cooperative  She appears distressed  HENT:   Head: Normocephalic  Eyes: Pupils are equal, round, and reactive to light  Neck: Normal range of motion  Neck supple  Cardiovascular: Normal rate and regular rhythm  Pulmonary/Chest: Effort normal and breath sounds normal  No respiratory distress  Abdominal: Soft  Bowel sounds are normal  There is tenderness  Musculoskeletal: Normal range of motion  She exhibits no tenderness  Neurological: She is alert  Skin: Skin is warm and dry  Psychiatric: She has a normal mood and affect  Her behavior is normal  Judgment and thought content normal    Nursing note and vitals reviewed  Additional Data:     Lab Results: I have personally reviewed pertinent reports        Results from last 7 days   Lab Units 09/25/19 1918   WBC Thousand/uL 11 52*   HEMOGLOBIN g/dL 12 8   HEMATOCRIT % 39 3   PLATELETS Thousands/uL 238   NEUTROS PCT % 85*   LYMPHS PCT % 5*   MONOS PCT % 5   EOS PCT % 5     Results from last 7 days   Lab Units 09/25/19 1918   SODIUM mmol/L 135*   POTASSIUM mmol/L 3 6   CHLORIDE mmol/L 99*   CO2 mmol/L 27   BUN mg/dL 15   CREATININE mg/dL 1 44*   ANION GAP mmol/L 9   CALCIUM mg/dL 10 0   ALBUMIN g/dL 3 4*   TOTAL BILIRUBIN mg/dL 0 60   ALK PHOS U/L 96   ALT U/L 29   AST U/L 22   GLUCOSE RANDOM mg/dL 156*                       Imaging: I have personally reviewed pertinent reports  No orders to display       EKG, Pathology, and Other Studies Reviewed on Admission:   · EKG:  Not available at this time    [facility] Reviewed[facility] Yes     ** Please Note: This note has been constructed using a voice recognition system   **

## 2019-09-26 NOTE — ASSESSMENT & PLAN NOTE
Patient reports subjective fever  T-max of 1 no to this morning  Has remained afebrile since arrival  Prn Tylenol  Continue IV antibiotics for treatment of UTI  Supportive care

## 2019-09-26 NOTE — PLAN OF CARE
Problem: PAIN - ADULT  Goal: Verbalizes/displays adequate comfort level or baseline comfort level  Description  Interventions:  - Encourage patient to monitor pain and request assistance  - Assess pain using appropriate pain scale  - Administer analgesics based on type and severity of pain and evaluate response  - Implement non-pharmacological measures as appropriate and evaluate response  - Consider cultural and social influences on pain and pain management  - Notify physician/advanced practitioner if interventions unsuccessful or patient reports new pain  Outcome: Progressing     Problem: INFECTION - ADULT  Goal: Absence or prevention of progression during hospitalization  Description  INTERVENTIONS:  - Assess and monitor for signs and symptoms of infection  - Monitor lab/diagnostic results  - Monitor all insertion sites, i e  indwelling lines, tubes, and drains  - Monitor endotracheal if appropriate and nasal secretions for changes in amount and color  - [place] appropriate cooling/warming therapies per order  - Administer medications as ordered  - Instruct and encourage patient and family to use good hand hygiene technique  - Identify and instruct in appropriate isolation precautions for identified infection/condition  Outcome: Progressing  Goal: Absence of fever/infection during neutropenic period  Description  INTERVENTIONS:  - Monitor WBC    Outcome: Progressing     Problem: SAFETY ADULT  Goal: Patient will remain free of falls  Description  INTERVENTIONS:  - Assess patient frequently for physical needs  -  Identify cognitive and physical deficits and behaviors that affect risk of falls    -  [place] fall precautions as indicated by assessment   - Educate patient/family on patient safety including physical limitations  - Instruct patient to call for assistance with activity based on assessment  - Modify environment to reduce risk of injury  - Consider OT/PT consult to assist with strengthening/mobility  Outcome: Progressing  Goal: Maintain or return to baseline ADL function  Description  INTERVENTIONS:  -  Assess patient's ability to carry out ADLs; assess patient's baseline for ADL function and identify physical deficits which impact ability to perform ADLs (bathing, care of mouth/teeth, toileting, grooming, dressing, etc )  - Assess/evaluate cause of self-care deficits   - Assess range of motion  - Assess patient's mobility; develop plan if impaired  - Assess patient's need for assistive devices and provide as appropriate  - Encourage maximum independence but intervene and supervise when necessary  - Involve family in performance of ADLs  - Assess for home care needs following discharge   - Consider OT consult to assist with ADL evaluation and planning for discharge  - Provide patient education as appropriate  Outcome: Progressing  Goal: Maintain or return mobility status to optimal level  Description  INTERVENTIONS:  - Assess patient's baseline mobility status (ambulation, transfers, stairs, etc )    - Identify cognitive and physical deficits and behaviors that affect mobility  - Identify mobility aids required to assist with transfers and/or ambulation (gait belt, sit-to-stand, lift, walker, cane, etc )  - [place] fall precautions as indicated by assessment  - Record patient progress and toleration of activity level on Mobility SBAR; progress patient to next Phase/Stage  - Instruct patient to call for assistance with activity based on assessment  - Consider rehabilitation consult to assist with strengthening/weightbearing, etc   Outcome: Progressing     Problem: DISCHARGE PLANNING  Goal: Discharge to home or other facility with appropriate resources  Description  INTERVENTIONS:  - Identify barriers to discharge w/patient and caregiver  - Arrange for needed discharge resources and transportation as appropriate  - Identify discharge learning needs (meds, wound care, etc )  - Arrange for interpretive services to assist at discharge as needed  - Refer to Case Management Department for coordinating discharge planning if the patient needs post-hospital services based on physician/advanced practitioner order or complex needs related to functional status, cognitive ability, or social support system  Outcome: Progressing     Problem: Knowledge Deficit  Goal: Patient/family/caregiver demonstrates understanding of disease process, treatment plan, medications, and discharge instructions  Description  Complete learning assessment and assess knowledge base    Interventions:  - Provide teaching at level of understanding  - Provide teaching via preferred learning methods  Outcome: Progressing

## 2019-09-26 NOTE — SOCIAL WORK
CM discussed pt in care coordination rounds  CM met w/ pt at bedside for d/c planning  Pt verbalizes understanding of CM role  PTA Pt live w/ daughter/ Gabby Perry/emergency contact  in 2 story level;  style home w/ no steps to enter residence  Bed & baths on 1st floor 2nd floor not used by pt  Pt is independent w/ all ADLS & Ambulates with assistive device of rollator  No DME in home  Pt has no VNA, and has had no short term rehab, psych or D & A Admission history  Pt uses [facility]  in [place] for pharmacy needs,  No POA  Dr Hussain Morley is PCP  Pt is retired and drives  No [facility] assistance  Pt declines VNA at this current time when offered  CM reviewed d/c planning process including the following: identifying help at home, patient preference for d/c planning needs, Discharge Lounge, [facility] Meds to Bed program, availability of treatment team to discuss questions or concerns patient and/or family may have regarding understanding medications and recognizing signs and symptoms once discharged  CM also encouraged patient to follow up with all recommended appointments after discharge  Patient advised of importance for patient and family to participate in managing patients medical well being  Laney Mercado

## 2019-09-27 VITALS
WEIGHT: 121 LBS | OXYGEN SATURATION: 96 % | HEIGHT: 61 IN | BODY MASS INDEX: 22.84 KG/M2 | TEMPERATURE: 98.3 F | RESPIRATION RATE: 18 BRPM | SYSTOLIC BLOOD PRESSURE: 120 MMHG | HEART RATE: 63 BPM | DIASTOLIC BLOOD PRESSURE: 62 MMHG

## 2019-09-27 LAB
ANION GAP SERPL CALCULATED.3IONS-SCNC: 13 MMOL/L (ref 4–13)
BACTERIA UR CULT: NORMAL
BASOPHILS # BLD AUTO: 0.04 THOUSANDS/ΜL (ref 0–0.1)
BASOPHILS NFR BLD AUTO: 1 % (ref 0–1)
BUN SERPL-MCNC: 16 MG/DL (ref 5–25)
CALCIUM SERPL-MCNC: 9.2 MG/DL (ref 8.3–10.1)
CHLORIDE SERPL-SCNC: 106 MMOL/L (ref 100–108)
CO2 SERPL-SCNC: 23 MMOL/L (ref 21–32)
CREAT SERPL-MCNC: 0.99 MG/DL (ref 0.6–1.3)
EOSINOPHIL # BLD AUTO: 0.77 THOUSAND/ΜL (ref 0–0.61)
EOSINOPHIL NFR BLD AUTO: 16 % (ref 0–6)
ERYTHROCYTE [DISTWIDTH] IN BLOOD BY AUTOMATED COUNT: 13.4 % (ref 11.6–15.1)
GFR SERPL CREATININE-BSD FRML MDRD: 56 ML/MIN/1.73SQ M
GLUCOSE SERPL-MCNC: 143 MG/DL (ref 65–140)
GLUCOSE SERPL-MCNC: 87 MG/DL (ref 65–140)
GLUCOSE SERPL-MCNC: 89 MG/DL (ref 65–140)
HCT VFR BLD AUTO: 33.4 % (ref 34.8–46.1)
HGB BLD-MCNC: 11 G/DL (ref 11.5–15.4)
IMM GRANULOCYTES # BLD AUTO: 0.01 THOUSAND/UL (ref 0–0.2)
IMM GRANULOCYTES NFR BLD AUTO: 0 % (ref 0–2)
LYMPHOCYTES # BLD AUTO: 1.14 THOUSANDS/ΜL (ref 0.6–4.47)
LYMPHOCYTES NFR BLD AUTO: 23 % (ref 14–44)
MCH RBC QN AUTO: 31.7 PG (ref 26.8–34.3)
MCHC RBC AUTO-ENTMCNC: 32.9 G/DL (ref 31.4–37.4)
MCV RBC AUTO: 96 FL (ref 82–98)
MONOCYTES # BLD AUTO: 0.52 THOUSAND/ΜL (ref 0.17–1.22)
MONOCYTES NFR BLD AUTO: 11 % (ref 4–12)
NEUTROPHILS # BLD AUTO: 2.46 THOUSANDS/ΜL (ref 1.85–7.62)
NEUTS SEG NFR BLD AUTO: 49 % (ref 43–75)
NRBC BLD AUTO-RTO: 0 /100 WBCS
PLATELET # BLD AUTO: 221 THOUSANDS/UL (ref 149–390)
PMV BLD AUTO: 11.8 FL (ref 8.9–12.7)
POTASSIUM SERPL-SCNC: 3.1 MMOL/L (ref 3.5–5.3)
RBC # BLD AUTO: 3.47 MILLION/UL (ref 3.81–5.12)
SODIUM SERPL-SCNC: 142 MMOL/L (ref 136–145)
WBC # BLD AUTO: 4.94 THOUSAND/UL (ref 4.31–10.16)

## 2019-09-27 PROCEDURE — 99239 HOSP IP/OBS DSCHRG MGMT >30: CPT | Performed by: NURSE PRACTITIONER

## 2019-09-27 PROCEDURE — 82948 REAGENT STRIP/BLOOD GLUCOSE: CPT

## 2019-09-27 PROCEDURE — 85025 COMPLETE CBC W/AUTO DIFF WBC: CPT | Performed by: NURSE PRACTITIONER

## 2019-09-27 PROCEDURE — 80048 BASIC METABOLIC PNL TOTAL CA: CPT | Performed by: NURSE PRACTITIONER

## 2019-09-27 RX ORDER — POTASSIUM CHLORIDE 20 MEQ/1
40 TABLET, EXTENDED RELEASE ORAL ONCE
Status: COMPLETED | OUTPATIENT
Start: 2019-09-27 | End: 2019-09-27

## 2019-09-27 RX ORDER — CEFDINIR 300 MG/1
300 CAPSULE ORAL EVERY 12 HOURS SCHEDULED
Qty: 14 CAPSULE | Refills: 0 | Status: SHIPPED | OUTPATIENT
Start: 2019-09-27 | End: 2019-10-04

## 2019-09-27 RX ADMIN — POTASSIUM CHLORIDE 40 MEQ: 1500 TABLET, EXTENDED RELEASE ORAL at 10:45

## 2019-09-27 RX ADMIN — AMLODIPINE BESYLATE 5 MG: 5 TABLET ORAL at 10:42

## 2019-09-27 RX ADMIN — OXYBUTYNIN CHLORIDE 5 MG: 5 TABLET ORAL at 10:43

## 2019-09-27 RX ADMIN — ASPIRIN 81 MG: 81 TABLET, COATED ORAL at 10:42

## 2019-09-27 RX ADMIN — ATORVASTATIN CALCIUM 10 MG: 10 TABLET, FILM COATED ORAL at 10:42

## 2019-09-27 RX ADMIN — HEPARIN SODIUM 5000 UNITS: 5000 INJECTION INTRAVENOUS; SUBCUTANEOUS at 05:01

## 2019-09-27 NOTE — ASSESSMENT & PLAN NOTE
· Upon review morning labs values potassium was found to be 3 1  · 40 mEq once of oral potassium was given for repletion  · Will follow up with primary care provider for further management and observation

## 2019-09-27 NOTE — ASSESSMENT & PLAN NOTE
· Patient reports subjective fever at home  · Afebrile since admission  · Prn Tylenol  · See infectious workup as above

## 2019-09-27 NOTE — PLAN OF CARE
Problem: PAIN - ADULT  Goal: Verbalizes/displays adequate comfort level or baseline comfort level  Description  Interventions:  - Encourage patient to monitor pain and request assistance  - Assess pain using appropriate pain scale  - Administer analgesics based on type and severity of pain and evaluate response  - Implement non-pharmacological measures as appropriate and evaluate response  - Consider cultural and social influences on pain and pain management  - Notify physician/advanced practitioner if interventions unsuccessful or patient reports new pain  Outcome: Progressing     Problem: INFECTION - ADULT  Goal: Absence or prevention of progression during hospitalization  Description  INTERVENTIONS:  - Assess and monitor for signs and symptoms of infection  - Monitor lab/diagnostic results  - Monitor all insertion sites, i e  indwelling lines, tubes, and drains  - Monitor endotracheal if appropriate and nasal secretions for changes in amount and color  - [place] appropriate cooling/warming therapies per order  - Administer medications as ordered  - Instruct and encourage patient and family to use good hand hygiene technique  - Identify and instruct in appropriate isolation precautions for identified infection/condition  Outcome: Progressing  Goal: Absence of fever/infection during neutropenic period  Description  INTERVENTIONS:  - Monitor WBC    Outcome: Progressing     Problem: SAFETY ADULT  Goal: Patient will remain free of falls  Description  INTERVENTIONS:  - Assess patient frequently for physical needs  -  Identify cognitive and physical deficits and behaviors that affect risk of falls    -  [place] fall precautions as indicated by assessment   - Educate patient/family on patient safety including physical limitations  - Instruct patient to call for assistance with activity based on assessment  - Modify environment to reduce risk of injury  - Consider OT/PT consult to assist with strengthening/mobility  Outcome: Progressing  Goal: Maintain or return to baseline ADL function  Description  INTERVENTIONS:  -  Assess patient's ability to carry out ADLs; assess patient's baseline for ADL function and identify physical deficits which impact ability to perform ADLs (bathing, care of mouth/teeth, toileting, grooming, dressing, etc )  - Assess/evaluate cause of self-care deficits   - Assess range of motion  - Assess patient's mobility; develop plan if impaired  - Assess patient's need for assistive devices and provide as appropriate  - Encourage maximum independence but intervene and supervise when necessary  - Involve family in performance of ADLs  - Assess for home care needs following discharge   - Consider OT consult to assist with ADL evaluation and planning for discharge  - Provide patient education as appropriate  Outcome: Progressing  Goal: Maintain or return mobility status to optimal level  Description  INTERVENTIONS:  - Assess patient's baseline mobility status (ambulation, transfers, stairs, etc )    - Identify cognitive and physical deficits and behaviors that affect mobility  - Identify mobility aids required to assist with transfers and/or ambulation (gait belt, sit-to-stand, lift, walker, cane, etc )  - [place] fall precautions as indicated by assessment  - Record patient progress and toleration of activity level on Mobility SBAR; progress patient to next Phase/Stage  - Instruct patient to call for assistance with activity based on assessment  - Consider rehabilitation consult to assist with strengthening/weightbearing, etc   Outcome: Progressing     Problem: DISCHARGE PLANNING  Goal: Discharge to home or other facility with appropriate resources  Description  INTERVENTIONS:  - Identify barriers to discharge w/patient and caregiver  - Arrange for needed discharge resources and transportation as appropriate  - Identify discharge learning needs (meds, wound care, etc )  - Arrange for interpretive services to assist at discharge as needed  - Refer to Case Management Department for coordinating discharge planning if the patient needs post-hospital services based on physician/advanced practitioner order or complex needs related to functional status, cognitive ability, or social support system  Outcome: Progressing     Problem: Knowledge Deficit  Goal: Patient/family/caregiver demonstrates understanding of disease process, treatment plan, medications, and discharge instructions  Description  Complete learning assessment and assess knowledge base  Interventions:  - Provide teaching at level of understanding  - Provide teaching via preferred learning methods  Outcome: Progressing     Problem: Potential for Falls  Goal: Patient will remain free of falls  Description  INTERVENTIONS:  - Assess patient frequently for physical needs  -  Identify cognitive and physical deficits and behaviors that affect risk of falls    -  [place] fall precautions as indicated by assessment   - Educate patient/family on patient safety including physical limitations  - Instruct patient to call for assistance with activity based on assessment  - Modify environment to reduce risk of injury  - Consider OT/PT consult to assist with strengthening/mobility  Outcome: Progressing

## 2019-09-27 NOTE — DISCHARGE INSTRUCTIONS
Chronic Hypertension   AMBULATORY CARE:   Hypertension  is high blood pressure (BP)  Your BP is the force of your blood moving against the walls of your arteries  Normal BP is less than 120/80  Prehypertension is between 120/80 and 139/89  Hypertension is 140/90 or higher  Hypertension causes your BP to get so high that your heart has to work much harder than normal  This can damage your heart  Chronic hypertension is a long-term condition that you can control with a healthy lifestyle or medicines  A controlled blood pressure helps protect your organs, such as your heart, lungs, brain, and kidneys  Common symptoms include the following:   · Headache     · Blurred vision    · Chest pain     · Dizziness or weakness     · Trouble breathing     · Nosebleeds  Call 911 for any of the following:   · You have discomfort in your chest that feels like squeezing, pressure, fullness, or pain  · You become confused or have difficulty speaking  · You suddenly feel lightheaded or have trouble breathing  · You have pain or discomfort in your back, neck, jaw, stomach, or arm  Seek care immediately if:   · You have a severe headache or vision loss  · You have weakness in an arm or leg  Contact your healthcare provider if:   · You feel faint, dizzy, confused, or drowsy  · You have been taking your BP medicine and your BP is still higher than your healthcare provider says it should be  · You have questions or concerns about your condition or care  Treatment for chronic hypertension  may include medicine to lower your BP and lower your cholesterol level  A low cholesterol level helps prevent heart disease and makes it easier to control your blood pressure  Heart disease can make your blood pressure harder to control  You may also need to make lifestyle changes  Take your medicine exactly as directed    Manage chronic hypertension:  Talk with your healthcare provider about these and other ways to manage hypertension:  · Take your BP at home  Sit and rest for 5 minutes before you take your BP  Extend your arm and support it on a flat surface  Your arm should be at the same level as your heart  Follow the directions that came with your BP monitor  If possible, take at least 2 BP readings each time  Take your BP at least twice a day at the same times each day, such as morning and evening  Keep a record of your BP readings and bring it to your follow-up visits  Ask your healthcare provider what your blood pressure should be  · Limit sodium (salt) as directed  Too much sodium can affect your fluid balance  Check labels to find low-sodium or no-salt-added foods  Some low-sodium foods use potassium salts for flavor  Too much potassium can also cause health problems  Your healthcare provider will tell you how much sodium and potassium are safe for you to have in a day  He or she may recommend that you limit sodium to 2,300 mg a day  · Follow the meal plan recommended by your healthcare provider  A dietitian or your provider can give you more information on low-sodium plans or the DASH (Dietary Approaches to Stop Hypertension) eating plan  The DASH plan is low in sodium, unhealthy fats, and total fat  It is high in potassium, calcium, and fiber  · Exercise to maintain a healthy weight  Exercise at least 30 minutes per day, on most days of the week  This will help decrease your blood pressure  Ask about the best exercise plan for you  · Decrease stress  This may help lower your BP  Learn ways to relax, such as deep breathing or listening to music  · Limit alcohol  Women should limit alcohol to 1 drink a day  Men should limit alcohol to 2 drinks a day  A drink of alcohol is 12 ounces of beer, 5 ounces of wine, or 1½ ounces of liquor  · Do not smoke  Nicotine and other chemicals in cigarettes and cigars can increase your BP and also cause lung damage   Ask your healthcare provider for information if you currently smoke and need help to quit  E-cigarettes or smokeless tobacco still contain nicotine  Talk to your healthcare provider before you use these products  Follow up with your healthcare provider as directed: You will need to return to have your BP checked and to have other lab tests done  Write down your questions so you remember to ask them during your visits  © 2017 [address] Information is for End User's use only and may not be sold, redistributed or otherwise used for commercial purposes  All illustrations and images included in CareNotes® are the copyrighted property of A D A M , Inc  or Garrison Funes  The above information is an  only  It is not intended as medical advice for individual conditions or treatments  Talk to your doctor, nurse or pharmacist before following any medical regimen to see if it is safe and effective for you  Chronic Kidney Disease, Ambulatory Care   Clementina LEVY & Jocy WL: Clinical practice  Stage IV chronic kidney disease  N Engl J Med 2010; 362(1):56-65  Me[place] GR: Chronic Kidney Disease  In: Yuliana NH, Karen HERNANDEZ, et al, [facility]  Principles of Ambulatory Medicine, 7th ed  [address], 2007  Anthony MORELAND & Sami GONZALEZ: A Stepped Care Approach to the Management of Chronic Kidney Disease  In: Sami GONZALEZ, Kelley HERNANDEZ, et al, [place]  [place] [facility] and [place]'s The Kidney, 9th ed  [address] [facility], [place], [place], 2012  [facility] 9 of Nephrology: Evidence-based practice guideline for the treatment of CKD  Clin Exp Nephrol 2009; 13(6):537-566  Adriel NH: Chronic Kidney Disease  In: Sebastián LEE, ed  The 5-Minute Clinical Consult 2014, 22nd ed  [address], 2014 © 2014 [address] is for End User's use only and may not be sold, redistributed or otherwise used for commercial purposes   All illustrations and images included in CareNotes® are the copyrighted property of [employer]  or Garrison Funes  The above information is an  only  It is not intended as medical advice for individual conditions or treatments  Talk to your doctor, nurse or pharmacist before following any medical regimen to see if it is safe and effective for you  Urinary Tract Infection in Women   WHAT YOU NEED TO KNOW:   A urinary tract infection (UTI) is caused by bacteria that get inside your urinary tract  Most bacteria that enter your urinary tract come out when you urinate  If the bacteria stay in your urinary tract, you may get an infection  Your urinary tract includes your kidneys, ureters, bladder, and urethra  Urine is made in your kidneys, and it flows from the ureters to the bladder  Urine leaves the bladder through the urethra  A UTI is more common in your lower urinary tract, which includes your bladder and urethra  DISCHARGE INSTRUCTIONS:   Seek care immediately if:   · You are urinating very little or not at all  · You have a high fever with shaking chills  · You have side or back pain that gets worse  Contact your healthcare provider if:   · You have a fever  · You do not feel better after 2 days of taking antibiotics  · You are vomiting  · You have questions or concerns about your condition or care  Medicines:   · Antibiotics  help fight a bacterial infection  · Medicines  may be given to decrease pain and burning when you urinate  They will also help decrease the feeling that you need to urinate often  These medicines will make your urine orange or red  · Take your medicine as directed  Contact your healthcare provider if you think your medicine is not helping or if you have side effects  Tell him or her if you are allergic to any medicine  Keep a list of the medicines, vitamins, and herbs you take  Include the amounts, and when and why you take them   Bring the list or the pill bottles to follow-up visits  Carry your medicine list with you in case of an emergency  Follow up with your healthcare provider as directed:  Write down your questions so you remember to ask them during your visits  Prevent another UTI:   · Empty your bladder often  Urinate and empty your bladder as soon as you feel the need  Do not hold your urine for long periods of time  · Wipe from front to back after you urinate or have a bowel movement  This will help prevent germs from getting into your urinary tract through your urethra  · Drink liquids as directed  Ask how much liquid to drink each day and which liquids are best for you  You may need to drink more liquids than usual to help flush out the bacteria  Do not drink alcohol, caffeine, or citrus juices  These can irritate your bladder and increase your symptoms  Your healthcare provider may recommend cranberry juice to help prevent a UTI  · Urinate after you have sex  This can help flush out bacteria passed during sex  · Do not douche or use feminine deodorants  These can change the chemical balance in your vagina  · Change sanitary pads or tampons often  This will help prevent germs from getting into your urinary tract  · Do pelvic muscle exercises often  Pelvic muscle exercises may help you start and stop urinating  Strong pelvic muscles may help you empty your bladder easier  Squeeze these muscles tightly for 5 seconds like you are trying to hold back urine  Then relax for 5 seconds  Gradually work up to squeezing for 10 seconds  Do 3 sets of 15 repetitions a day, or as directed  © 2017 [address] Information is for End User's use only and may not be sold, redistributed or otherwise used for commercial purposes  All illustrations and images included in CareNotes® are the copyrighted property of [employer]  or Garrison Funes  The above information is an  only   It is not intended as medical advice for individual conditions or treatments  Talk to your doctor, nurse or pharmacist before following any medical regimen to see if it is safe and effective for you  Urinary Traction Infection in Older Adults   WHAT YOU NEED TO KNOW:   A urinary tract infection (UTI) is caused by bacteria that get inside your urinary tract  Your urinary tract includes your kidneys, ureters, bladder, and urethra  Urine is made in your kidneys, and it flows from the ureters to the bladder  Urine leaves the bladder through the urethra  A UTI is more common in your lower urinary tract, which includes your bladder and urethra  DISCHARGE INSTRUCTIONS:   Seek care immediately if:   · You are urinating very little or not at all  · You are vomiting  · You have a high fever with shaking chills  · You have side or back pain that gets worse  Contact your healthcare provider if:   · You have a fever  · You are a woman and you have increased white or yellow discharge from your vagina  · You do not feel better after 2 days of taking antibiotics  · You have questions or concerns about your condition or care  Medicines:   · Medicines  help treat the bacterial infection or decrease pain and burning when you urinate  You may also need medicines to decrease the urge to urinate often  Your healthcare provider may recommend cranberry juice or cranberry supplements to help decrease your symptoms  · Take your medicine as directed  Contact your healthcare provider if you think your medicine is not helping or if you have side effects  Tell him or her if you are allergic to any medicine  Keep a list of the medicines, vitamins, and herbs you take  Include the amounts, and when and why you take them  Bring the list or the pill bottles to follow-up visits  Carry your medicine list with you in case of an emergency  Self-care:   · Urinate when you feel the urge    Do not hold your urine because bacteria can grow in the bladder if urine stays in the bladder too long  It may be helpful to urinate at least every 3 to 4 hours  · Drink liquids as directed  Liquids can help flush bacteria from your urinary tract  Ask how much liquid to drink each day and which liquids are best for you  You may need to drink more liquids than usual to help flush out the bacteria  Do not drink alcohol, caffeine, and citrus juices  These can irritate your bladder and increase your symptoms  · Apply heat  on your abdomen for 20 to 30 minutes every 2 hours for as many days as directed  Heat helps decrease discomfort and pressure in your bladder  Prevent a UTI:   · Women should wipe front to back  after urinating or having a bowel movement  This may prevent germs from getting into the urinary tract  · Urinate after you have sex  to flush away bacteria that can enter your urinary tract during sex  · Wear cotton underwear and clothes that fit loose  Tight pants and nylon underwear can trap moisture and cause bacteria to grow  Follow up with your healthcare provider as directed:  Write down your questions so you remember to ask them during your visits  © 2017 [facility] Information is for End User's use only and may not be sold, redistributed or otherwise used for commercial purposes  All illustrations and images included in CareNotes® are the copyrighted property of A D A M , Inc  or Garrison Funes  The above information is an  only  It is not intended as medical advice for individual conditions or treatments  Talk to your doctor, nurse or pharmacist before following any medical regimen to see if it is safe and effective for you  Hypokalemia   WHAT YOU NEED TO KNOW:   Hypokalemia is a low level of potassium in your blood  Potassium helps control how your muscles, heart, and digestive system work   Hypokalemia occurs when your body loses too much potassium or does not absorb enough from food  DISCHARGE INSTRUCTIONS:   Seek care immediately if:   · You cannot move your arm or leg  · You have a fast or irregular heartbeat  · You are too tired or weak to stand up  Contact your healthcare provider if:   · You are vomiting, or you have diarrhea  · You have numbness or tingling in your arms or legs  · Your symptoms do not go away or they get worse  · You have questions or concerns about your condition or care  Medicines:   · Potassium  will be given to bring your potassium levels back to normal     · Take your medicine as directed  Contact your healthcare provider if you think your medicine is not helping or if you have side effects  Tell him of her if you are allergic to any medicine  Keep a list of the medicines, vitamins, and herbs you take  Include the amounts, and when and why you take them  Bring the list or the pill bottles to follow-up visits  Carry your medicine list with you in case of an emergency  Eat foods that are high in potassium:  Foods that are high in potassium include bananas, oranges, tomatoes, potatoes, and avocado  Parks beans, turkey, salmon, lean beef, yogurt, and milk are also high in potassium  Ask your healthcare provider or dietitian for more information about foods that are high in potassium  Follow up with your healthcare provider as directed:  Write down your questions so you remember to ask them during your visits  © 2017 [address] Information is for End User's use only and may not be sold, redistributed or otherwise used for commercial purposes  All illustrations and images included in CareNotes® are the copyrighted property of A D A M , Inc  or Garrison Funes  The above information is an  only  It is not intended as medical advice for individual conditions or treatments   Talk to your doctor, nurse or pharmacist before following any medical regimen to see if it is safe and effective for you

## 2019-09-27 NOTE — DISCHARGE SUMMARY
Discharge- Xin Perez 1945, 66 y o  female MRN: [id number]    Unit/Bed#: -01 Encounter: [id number]    Primary Care Provider: MEMO Maldonado   Date and time admitted to hospital: 9/25/2019  6:54 PM        * UTI (urinary tract infection)  Assessment & Plan  · Background: Patient presented to the ED 2 days ago with positive UA was discharged on Macrobid  She had been taking antibiotics as prescribed however reports that her fever returned and she has had persistent symptoms  · Creatinine was minorly elevated on admission  Fluids given and creatinine trended down appropriately  · Did not meet sepsis or SIRS criteria  · WBC at discharge 4 94; peak of 11 52 on admission  · Continued IV Rocephin while inpatient  · Urine Cultures negative; likely secondary to Macrobid urine sterilization  · Prn Tylenol for pain and fever  · On exam patient denies fever, body aches, chills, nausea, vomiting, and diaphoresis  Patient does not endorse having any urinary symptoms  · Blood cultures negative by 24 hours  · Spoke with Infectious Disease over the phone regarding negative cultures and the need for continued antibiotics  There was no urine culture specimen from emergency department visit on the 23rd  Urine culture obtained on the 25th was status post Macrobid administration  Likely a urine culture was sterile due to antibiotics  Patient highly responsive to Rocephin  Will discharge with 7 day PO equivalence of ceftriaxone        Fever  Assessment & Plan  · Patient reports subjective fever at home  · Afebrile since admission  · Prn Tylenol  · See infectious workup as above      CKD (chronic kidney disease) stage 3, GFR 30-59 ml/min ([facility])  Assessment & Plan  · Baseline creatinine appears to be 1 0-1 1  · 1 44 on admission but has since resolved with creatinine 0 99      Essential hypertension  Assessment & Plan  · Continues to be well controlled since admission  · Continue home medications    Type 2 diabetes mellitus without complication, without long-term current use of insulin [facility])  Assessment & Plan  Lab Results   Component Value Date    HGBA1C 6 1 09/26/2019       Recent Labs     09/26/19  1539 09/26/19  2053 09/27/19  0557 09/27/19  1125   POCGLU 114 109 87 143*       Blood Sugar Average: Last 72 hrs:  (P) 119   · Discontinue metformin while inpatient  · Carb controlled diet, fingersticks, sliding scale  · Resume metformin at discharge    Hypokalemia  Assessment & Plan  · Upon review morning labs values potassium was found to be 3 1  · 40 mEq once of oral potassium was given for repletion  · Will follow up with primary care provider for further management and observation        Discharging Physician / Practitioner: MEMO Donald  PCP: David Russo  Admission Date:   Admission Orders (From admission, onward)     Ordered        09/25/19 2037  Inpatient Admission  Once                   Discharge Date: 09/27/19    Resolved Problems  Date Reviewed: 9/27/2019    None          Consultations During Hospital Stay:  · None    Procedures Performed:   · None    Significant Findings / Test Results:   · White blood cell count as noted above    Complications:  Urine culture status post antibiotic administration    Reason for Admission:  Unresolved urinary tract infection status post antibiotics    Hospital Course:     Kinza Marr is a 66 y o  female with known history of chronic kidney disease, hypertension, and type 2 diabetes patient who originally presented to the hospital on 9/25/2019 due to subjective fever and continued urinary symptoms associated with recently diagnosed urinary tract infection  Patient was seen on September 23rd in the emergency department for urinary tract infection and was discharged that day with a prescription for Macrobid  Patient presented to the emergency department again on September 25th for fever and worsening symptoms    Of note a urine culture was obtained on the 23rd and could not narrow down antibiotics  Ceftriaxone was administered IV and symptomatology has since resolved  Urine culture obtained on September 25th admission is without growth  I personally spoke with Infectious Disease over the phone and discussed the case  They believe the Macrobid caused sterile urine specimen  Due to efficacy ceftriaxone inpatient will discontinue IV and discharge patient with ceftriaxone equivalents for 7 days  On exam at discharge patient denies any urinary symptoms, fever, chills, rigors, pain, and constipation  Patient has been afebrile since admission  Patient is stable and ready for discharge    Please see above list of diagnoses and related plan for additional information  Condition at Discharge: stable     Discharge Day Visit / Exam:     Subjective:  "I do not have any complaints"  Vitals: Blood Pressure: 120/62 (09/27/19 0711)  Pulse: 63 (09/27/19 0711)  Temperature: 98 3 °F (36 8 °C) (09/27/19 0711)  Temp Source: Oral (09/27/19 0711)  Respirations: 18 (09/27/19 0711)  Height: 5' 1" (154 9 cm) (09/25/19 2226)  Weight - Scale: 54 9 kg (121 lb) (09/25/19 2226)  SpO2: 96 % (09/27/19 0711)  Exam:   Physical Exam   Constitutional: She appears well-nourished  She is active and cooperative  Cardiovascular: Normal rate, regular rhythm, normal heart sounds and intact distal pulses  Pulses:       Radial pulses are 2+ on the right side, and 2+ on the left side  Dorsalis pedis pulses are 2+ on the right side, and 2+ on the left side  Posterior tibial pulses are 2+ on the right side, and 2+ on the left side  No peripheral edema noted  Pulmonary/Chest: Breath sounds normal  No respiratory distress  She has no wheezes  Abdominal: Soft  Bowel sounds are normal  She exhibits no distension  There is no tenderness  Neurological: She is alert  Skin: Skin is warm and dry  Capillary refill takes less than 2 seconds     Psychiatric: She has a normal mood and affect  Her speech is normal and behavior is normal  Judgment normal    Vitals reviewed  Discharge instructions/Information to patient and family:   See after visit summary for information provided to patient and family  Provisions for Follow-Up Care:  See after visit summary for information related to follow-up care and any pertinent home health orders  Disposition:     Home       Discharge Statement:  I spent 45 minutes discharging the patient  This time was spent on the day of discharge  I had direct contact with the patient on the day of discharge  Greater than 50% of the total time was spent examining patient, answering all patient questions, arranging and discussing plan of care with patient as well as directly providing post-discharge instructions  Additional time then spent on discharge activities  Discharge Medications:  See after visit summary for reconciled discharge medications provided to patient and family        ** Please Note: This note has been constructed using a voice recognition system **

## 2019-09-27 NOTE — ASSESSMENT & PLAN NOTE
· Baseline creatinine appears to be 1 0-1 1  · 1 44 on admission but has since resolved with creatinine 0 99

## 2019-09-27 NOTE — ASSESSMENT & PLAN NOTE
Lab Results   Component Value Date    HGBA1C 6 1 09/26/2019       Recent Labs     09/26/19  1539 09/26/19  2053 09/27/19  0557 09/27/19  1125   POCGLU 114 109 87 143*       Blood Sugar Average: Last 72 hrs:  (P) 119   · Discontinue metformin while inpatient  · Carb controlled diet, fingersticks, sliding scale  · Resume metformin at discharge

## 2019-09-27 NOTE — ASSESSMENT & PLAN NOTE
· Background: Patient presented to the ED 2 days ago with positive UA was discharged on Macrobid  She had been taking antibiotics as prescribed however reports that her fever returned and she has had persistent symptoms  · Creatinine was minorly elevated on admission  Fluids given and creatinine trended down appropriately  · Did not meet sepsis or SIRS criteria  · WBC at discharge 4 94; peak of 11 52 on admission  · Continued IV Rocephin while inpatient  · Urine Cultures negative; likely secondary to Macrobid urine sterilization  · Prn Tylenol for pain and fever  · On exam patient denies fever, body aches, chills, nausea, vomiting, and diaphoresis  Patient does not endorse having any urinary symptoms  · Blood cultures negative by 24 hours  · Spoke with Infectious Disease over the phone regarding negative cultures and the need for continued antibiotics  There was no urine culture specimen from emergency department visit on the 23rd  Urine culture obtained on the 25th was status post Macrobid administration  Likely a urine culture was sterile due to antibiotics  Patient highly responsive to Rocephin  Will discharge with 7 day PO equivalence of ceftriaxone

## 2019-09-27 NOTE — PLAN OF CARE
Problem: PAIN - ADULT  Goal: Verbalizes/displays adequate comfort level or baseline comfort level  Description  Interventions:  - Encourage patient to monitor pain and request assistance  - Assess pain using appropriate pain scale  - Administer analgesics based on type and severity of pain and evaluate response  - Implement non-pharmacological measures as appropriate and evaluate response  - Consider cultural and social influences on pain and pain management  - Notify physician/advanced practitioner if interventions unsuccessful or patient reports new pain  Outcome: Progressing     Problem: INFECTION - ADULT  Goal: Absence or prevention of progression during hospitalization  Description  INTERVENTIONS:  - Assess and monitor for signs and symptoms of infection  - Monitor lab/diagnostic results  - Monitor all insertion sites, i e  indwelling lines, tubes, and drains  - Monitor endotracheal if appropriate and nasal secretions for changes in amount and color  - [place] appropriate cooling/warming therapies per order  - Administer medications as ordered  - Instruct and encourage patient and family to use good hand hygiene technique  - Identify and instruct in appropriate isolation precautions for identified infection/condition  Outcome: Progressing  Goal: Absence of fever/infection during neutropenic period  Description  INTERVENTIONS:  - Monitor WBC    Outcome: Progressing     Problem: SAFETY ADULT  Goal: Patient will remain free of falls  Description  INTERVENTIONS:  - Assess patient frequently for physical needs  -  Identify cognitive and physical deficits and behaviors that affect risk of falls    -  [place] fall precautions as indicated by assessment   - Educate patient/family on patient safety including physical limitations  - Instruct patient to call for assistance with activity based on assessment  - Modify environment to reduce risk of injury  - Consider OT/PT consult to assist with strengthening/mobility  Outcome: Progressing  Goal: Maintain or return to baseline ADL function  Description  INTERVENTIONS:  -  Assess patient's ability to carry out ADLs; assess patient's baseline for ADL function and identify physical deficits which impact ability to perform ADLs (bathing, care of mouth/teeth, toileting, grooming, dressing, etc )  - Assess/evaluate cause of self-care deficits   - Assess range of motion  - Assess patient's mobility; develop plan if impaired  - Assess patient's need for assistive devices and provide as appropriate  - Encourage maximum independence but intervene and supervise when necessary  - Involve family in performance of ADLs  - Assess for home care needs following discharge   - Consider OT consult to assist with ADL evaluation and planning for discharge  - Provide patient education as appropriate  Outcome: Progressing  Goal: Maintain or return mobility status to optimal level  Description  INTERVENTIONS:  - Assess patient's baseline mobility status (ambulation, transfers, stairs, etc )    - Identify cognitive and physical deficits and behaviors that affect mobility  - Identify mobility aids required to assist with transfers and/or ambulation (gait belt, sit-to-stand, lift, walker, cane, etc )  - [place] fall precautions as indicated by assessment  - Record patient progress and toleration of activity level on Mobility SBAR; progress patient to next Phase/Stage  - Instruct patient to call for assistance with activity based on assessment  - Consider rehabilitation consult to assist with strengthening/weightbearing, etc   Outcome: Progressing     Problem: DISCHARGE PLANNING  Goal: Discharge to home or other facility with appropriate resources  Description  INTERVENTIONS:  - Identify barriers to discharge w/patient and caregiver  - Arrange for needed discharge resources and transportation as appropriate  - Identify discharge learning needs (meds, wound care, etc )  - Arrange for interpretive services to assist at discharge as needed  - Refer to Case Management Department for coordinating discharge planning if the patient needs post-hospital services based on physician/advanced practitioner order or complex needs related to functional status, cognitive ability, or social support system  Outcome: Progressing     Problem: Knowledge Deficit  Goal: Patient/family/caregiver demonstrates understanding of disease process, treatment plan, medications, and discharge instructions  Description  Complete learning assessment and assess knowledge base  Interventions:  - Provide teaching at level of understanding  - Provide teaching via preferred learning methods  Outcome: Progressing     Problem: Potential for Falls  Goal: Patient will remain free of falls  Description  INTERVENTIONS:  - Assess patient frequently for physical needs  -  Identify cognitive and physical deficits and behaviors that affect risk of falls    -  [place] fall precautions as indicated by assessment   - Educate patient/family on patient safety including physical limitations  - Instruct patient to call for assistance with activity based on assessment  - Modify environment to reduce risk of injury  - Consider OT/PT consult to assist with strengthening/mobility  Outcome: Progressing

## 2019-10-01 ENCOUNTER — TRANSITIONAL CARE MANAGEMENT (OUTPATIENT)
Dept: FAMILY MEDICINE CLINIC | Facility: CLINIC | Age: 74
End: 2019-10-01

## 2019-10-01 LAB
BACTERIA BLD CULT: NORMAL
BACTERIA BLD CULT: NORMAL

## 2019-10-02 NOTE — PROGRESS NOTES
Assessment/Plan:       Diagnoses and all orders for this visit:    Transition of care performed with sharing of clinical summary  -     Comprehensive metabolic panel; Future  -     Hemoglobin A1C; Future  -     Lipid panel; Future    Acute pyelonephritis  -     Comprehensive metabolic panel; Future  -     Hemoglobin A1C; Future  -     Lipid panel; Future    CKD (chronic kidney disease) stage 3, GFR 30-59 ml/min ([facility])  -     Comprehensive metabolic panel; Future  -     Hemoglobin A1C; Future  -     Lipid panel; Future    Hyperlipidemia, mixed  -     Comprehensive metabolic panel; Future  -     Hemoglobin A1C; Future  -     Lipid panel; Future    OAB (overactive bladder)  -     oxybutynin (DITROPAN) 5 mg tablet; Take 1 tablet (5 mg total) by mouth 2 (two) times a day  -     Comprehensive metabolic panel; Future  -     Hemoglobin A1C; Future  -     Lipid panel; Future    Allergic conjunctivitis of both eyes  -     olopatadine (PATANOL) 0 1 % ophthalmic solution; Administer 1 drop to both eyes 2 (two) times a day for 5 days  -     Comprehensive metabolic panel; Future  -     Hemoglobin A1C; Future  -     Lipid panel; Future    Essential hypertension  -     lisinopril (ZESTRIL) 10 mg tablet; Take 1 tablet (10 mg total) by mouth daily  -     amLODIPine (NORVASC) 5 mg tablet; Take 1 tablet (5 mg total) by mouth daily  -     Comprehensive metabolic panel; Future  -     Hemoglobin A1C; Future  -     Lipid panel; Future    Mixed hyperlipidemia  -     atorvastatin (LIPITOR) 10 mg tablet; Take 1 tablet (10 mg total) by mouth daily  -     Comprehensive metabolic panel; Future  -     Hemoglobin A1C; Future  -     Lipid panel; Future    Type 2 diabetes mellitus without complication, without long-term current use of insulin ([facility])  -     metFORMIN (GLUCOPHAGE) 500 mg tablet; Take 1 tablet (500 mg total) by mouth 2 (two) times a day with meals  -     Comprehensive metabolic panel;  Future  -     Hemoglobin A1C; Future  -     Lipid panel; Future    Medicare annual wellness visit, subsequent    Other orders  -     levocetirizine (XYZAL) 5 MG tablet        No problem-specific Assessment & Plan notes found for this encounter  Subjective:      Patient ID: Maxine White is a 66 y o  female  Patient is being seen for hospital follow up  Recent hospitalization at [facility] 9/25-9/27  She initially was seen in ED and dx with UTI and prescribed macrobid  She returned a few days later with persistent fever and UTI symptoms  WBC were elevated at 11 5  Urine Cx neg, thought to be sterile due to recent macrobid use  Hypokalemia treated during admission  Presumed UTI tx with rocephin and WBC normal upon discharge  Diabetes stable with A1C 6 1  She was sent home with antibiotics  She has no urinary symptoms at this time  She denies fever, hematuria or abdominal pain  The following portions of the patient's history were reviewed and updated as appropriate:   She has a past medical history of Diabetes mellitus ([address] ), Eczema, Hypertension, Malignant neoplasm of skin, Nonmelanoma skin cancer, and Renal disorder  ,  does not have any pertinent problems on file  ,   has a past surgical history that includes Eye surgery and Skin biopsy  ,  family history includes Diabetes in her mother; Skin cancer in her mother  ,   reports that she has never smoked  She has never used smokeless tobacco  She reports that she drank alcohol  She reports that she does not use drugs  ,  is allergic to latex and penicillin g   Current Outpatient Medications   Medication Sig Dispense Refill    levocetirizine (XYZAL) 5 MG tablet       amLODIPine (NORVASC) 5 mg tablet Take 1 tablet (5 mg total) by mouth daily 90 tablet 1    aspirin (ECOTRIN LOW STRENGTH) 81 mg EC tablet Take 1 tablet (81 mg total) by mouth daily for 90 days 90 tablet 0    atorvastatin (LIPITOR) 10 mg tablet Take 1 tablet (10 mg total) by mouth daily 90 tablet 2    cefdinir (OMNICEF) 300 mg capsule Take 1 capsule (300 mg total) by mouth every 12 (twelve) hours for 7 days 14 capsule 0    lisinopril (ZESTRIL) 10 mg tablet Take 1 tablet (10 mg total) by mouth daily 90 tablet 2    metFORMIN (GLUCOPHAGE) 500 mg tablet Take 1 tablet (500 mg total) by mouth 2 (two) times a day with meals 180 tablet 2    olopatadine (PATANOL) 0 1 % ophthalmic solution Administer 1 drop to both eyes 2 (two) times a day for 5 days 5 mL 2    oxybutynin (DITROPAN) 5 mg tablet Take 1 tablet (5 mg total) by mouth 2 (two) times a day 90 tablet 2     No current facility-administered medications for this visit  Review of Systems   Constitutional: Negative  Negative for fatigue and fever  HENT: Negative  Negative for congestion  Eyes: Negative  Negative for visual disturbance  Respiratory: Negative for cough, chest tightness, shortness of breath and wheezing  Cardiovascular: Negative  Gastrointestinal: Negative  Negative for abdominal pain, blood in stool, diarrhea and nausea  Endocrine: Negative for polydipsia, polyphagia and polyuria  Genitourinary: Negative for difficulty urinating and flank pain  Musculoskeletal: Negative  Negative for arthralgias, back pain and myalgias  Skin: Negative  Negative for color change, pallor and rash  Allergic/Immunologic: Negative for immunocompromised state  Neurological: Negative  Negative for dizziness, weakness, light-headedness, numbness and headaches  Hematological: Negative for adenopathy  Psychiatric/Behavioral: Negative  Negative for confusion, decreased concentration and sleep disturbance  All other systems reviewed and are negative  Objective:  Vitals:    10/03/19 1414   BP: 138/82   BP Location: Left arm   Patient Position: Sitting   Pulse: 72   Resp: 18   SpO2: 97%   Weight: 53 5 kg (118 lb)   Height: 5' 1" (1 549 m)     Body mass index is 22 3 kg/m²  Physical Exam   Constitutional: She is oriented to person, place, and time   She appears well-developed and well-nourished  No distress  HENT:   Head: Normocephalic and atraumatic  Right Ear: External ear normal    Left Ear: External ear normal    Nose: Nose normal    Mouth/Throat: Oropharynx is clear and moist  No oropharyngeal exudate  Eyes: Pupils are equal, round, and reactive to light  Conjunctivae are normal  No scleral icterus  Neck: Normal range of motion  Neck supple  No JVD present  Cardiovascular: Normal rate, regular rhythm, normal heart sounds and intact distal pulses  Exam reveals no gallop and no friction rub  No murmur heard  Pulmonary/Chest: Effort normal and breath sounds normal  No respiratory distress  She exhibits no tenderness  Abdominal: Soft  Bowel sounds are normal  She exhibits no distension  There is no tenderness  Musculoskeletal: Normal range of motion  She exhibits no edema, tenderness or deformity  Lymphadenopathy:     She has no cervical adenopathy  Neurological: She is alert and oriented to person, place, and time  Coordination normal    Skin: Skin is warm and dry  Capillary refill takes less than 2 seconds  No rash noted  She is not diaphoretic  Psychiatric: She has a normal mood and affect  Her behavior is normal  Judgment and thought content normal    Nursing note and vitals reviewed

## 2019-10-03 ENCOUNTER — OFFICE VISIT (OUTPATIENT)
Dept: FAMILY MEDICINE CLINIC | Facility: CLINIC | Age: 74
End: 2019-10-03
Payer: MEDICARE

## 2019-10-03 VITALS
BODY MASS INDEX: 22.28 KG/M2 | OXYGEN SATURATION: 97 % | WEIGHT: 118 LBS | DIASTOLIC BLOOD PRESSURE: 82 MMHG | HEIGHT: 61 IN | SYSTOLIC BLOOD PRESSURE: 138 MMHG | RESPIRATION RATE: 18 BRPM | HEART RATE: 72 BPM

## 2019-10-03 DIAGNOSIS — IMO0001 TRANSITION OF CARE PERFORMED WITH SHARING OF CLINICAL SUMMARY: Primary | ICD-10-CM

## 2019-10-03 DIAGNOSIS — Z23 NEED FOR PNEUMOCOCCAL VACCINATION: ICD-10-CM

## 2019-10-03 DIAGNOSIS — Z00.00 MEDICARE ANNUAL WELLNESS VISIT, INITIAL: ICD-10-CM

## 2019-10-03 DIAGNOSIS — I10 ESSENTIAL HYPERTENSION: ICD-10-CM

## 2019-10-03 DIAGNOSIS — N18.30 CKD (CHRONIC KIDNEY DISEASE) STAGE 3, GFR 30-59 ML/MIN (HCC): ICD-10-CM

## 2019-10-03 DIAGNOSIS — H10.13 ALLERGIC CONJUNCTIVITIS OF BOTH EYES: ICD-10-CM

## 2019-10-03 DIAGNOSIS — N10 ACUTE PYELONEPHRITIS: ICD-10-CM

## 2019-10-03 DIAGNOSIS — E78.2 HYPERLIPIDEMIA, MIXED: ICD-10-CM

## 2019-10-03 DIAGNOSIS — N32.81 OAB (OVERACTIVE BLADDER): ICD-10-CM

## 2019-10-03 DIAGNOSIS — E78.2 MIXED HYPERLIPIDEMIA: ICD-10-CM

## 2019-10-03 DIAGNOSIS — E11.9 TYPE 2 DIABETES MELLITUS WITHOUT COMPLICATION, WITHOUT LONG-TERM CURRENT USE OF INSULIN (HCC): ICD-10-CM

## 2019-10-03 PROCEDURE — 90670 PCV13 VACCINE IM: CPT | Performed by: NURSE PRACTITIONER

## 2019-10-03 PROCEDURE — 99214 OFFICE O/P EST MOD 30 MIN: CPT | Performed by: NURSE PRACTITIONER

## 2019-10-03 PROCEDURE — G0439 PPPS, SUBSEQ VISIT: HCPCS | Performed by: NURSE PRACTITIONER

## 2019-10-03 PROCEDURE — G0009 ADMIN PNEUMOCOCCAL VACCINE: HCPCS | Performed by: NURSE PRACTITIONER

## 2019-10-03 RX ORDER — OLOPATADINE HYDROCHLORIDE 1 MG/ML
1 SOLUTION/ DROPS OPHTHALMIC 2 TIMES DAILY
Qty: 5 ML | Refills: 2 | Status: SHIPPED | OUTPATIENT
Start: 2019-10-03 | End: 2019-12-31

## 2019-10-03 RX ORDER — LISINOPRIL 10 MG/1
10 TABLET ORAL DAILY
Qty: 90 TABLET | Refills: 2 | Status: SHIPPED | OUTPATIENT
Start: 2019-10-03 | End: 2020-02-25 | Stop reason: SDUPTHER

## 2019-10-03 RX ORDER — LEVOCETIRIZINE DIHYDROCHLORIDE 5 MG/1
TABLET, FILM COATED ORAL
COMMUNITY
Start: 2018-11-20 | End: 2020-02-21

## 2019-10-03 RX ORDER — ATORVASTATIN CALCIUM 10 MG/1
10 TABLET, FILM COATED ORAL DAILY
Qty: 90 TABLET | Refills: 2 | Status: SHIPPED | OUTPATIENT
Start: 2019-10-03 | End: 2020-02-25

## 2019-10-03 RX ORDER — OXYBUTYNIN CHLORIDE 5 MG/1
5 TABLET ORAL 2 TIMES DAILY
Qty: 90 TABLET | Refills: 2 | Status: SHIPPED | OUTPATIENT
Start: 2019-10-03 | End: 2020-02-25

## 2019-10-03 RX ORDER — AMLODIPINE BESYLATE 5 MG/1
5 TABLET ORAL DAILY
Qty: 90 TABLET | Refills: 1 | Status: SHIPPED | OUTPATIENT
Start: 2019-10-03 | End: 2020-03-31

## 2019-10-03 NOTE — PATIENT INSTRUCTIONS
Transition of care and medicare wellness    DM- a1c is stable at 6 1  Continue low carb diet  Hypertension- stable  OAB- stable symptoms with medication  Hyperlipidemia-strove for 5 servings of fruits and veggies daily  CKD stage 3- stable  F/b Renal  Stay active  Follow up in 4 months  Medicare Preventive Visit Patient Instructions  Thank you for completing your Welcome to Medicare Visit or Medicare Annual Wellness Visit today  Your next wellness visit will be due in one year (10/3/2020)  The screening/preventive services that you may require over the next 5-10 years are detailed below  Some tests may not apply to you based off risk factors and/or age  Screening tests ordered at today's visit but not completed yet may show as past due  Also, please note that scanned in results may not display below  Preventive Screenings:  Service Recommendations Previous Testing/Comments   Colorectal Cancer Screening  * Colonoscopy    * Fecal Occult Blood Test (FOBT)/Fecal Immunochemical Test (FIT)  * Fecal DNA/Cologuard Test  * Flexible Sigmoidoscopy Age: 54-65 years old   Colonoscopy: every 10 years (may be performed more frequently if at higher risk)  OR  FOBT/FIT: every 1 year  OR  Cologuard: every 3 years  OR  Sigmoidoscopy: every 5 years  Screening may be recommended earlier than age 48 if at higher risk for colorectal cancer  Also, an individualized decision between you and your healthcare provider will decide whether screening between the ages of 74-80 would be appropriate  Colonoscopy: Not on file  FOBT/FIT: Not on file  Cologuard: Not on file  Sigmoidoscopy: Not on file    Screening Current     Breast Cancer Screening Age: 36 years old  Frequency: every 1-2 years  Not required if history of left and right mastectomy Mammogram: Not on file       Cervical Cancer Screening Between the ages of 21-29, pap smear recommended once every 3 years     Between the ages of 33-67, can perform pap smear with HPV co-testing every 5 years  Recommendations may differ for women with a history of total hysterectomy, cervical cancer, or abnormal pap smears in past  Pap Smear: Not on file    Screening Not Indicated   Hepatitis C Screening Once for adults born between 1945 and 1965  More frequently in patients at high risk for Hepatitis C Hep C Antibody: Not on file       Diabetes Screening 1-2 times per year if you're at risk for diabetes or have pre-diabetes Fasting glucose: No results in last 5 years   A1C: 6 1 %    Screening Not Indicated  History Diabetes   Cholesterol Screening Once every 5 years if you don't have a lipid disorder  May order more often based on risk factors  Lipid panel: 11/05/2018    Screening Not Indicated  History Lipid Disorder     Other Preventive Screenings Covered by Medicare:  1  Abdominal Aortic Aneurysm (AAA) Screening: covered once if your at risk  You're considered to be at risk if you have a family history of AAA  2  Lung Cancer Screening: covers low dose CT scan once per year if you meet all of the following conditions: (1) Age 50-69; (2) No signs or symptoms of lung cancer; (3) Current smoker or have quit smoking within the last 15 years; (4) You have a tobacco smoking history of at least 30 pack years (packs per day multiplied by number of years you smoked); (5) You get a written order from a healthcare provider  3  Glaucoma Screening: covered annually if you're considered high risk: (1) You have diabetes OR (2) Family history of glaucoma OR (3)  aged 48 and older OR (3)  American aged 72 and older  3   Osteoporosis Screening: covered every 2 years if you meet one of the following conditions: (1) You're estrogen deficient and at risk for osteoporosis based off medical history and other findings; (2) Have a vertebral abnormality; (3) On glucocorticoid therapy for more than 3 months; (4) Have primary hyperparathyroidism; (5) On osteoporosis medications and need to assess response to drug therapy  · Last bone density test (DXA Scan): Not on file  5  HIV Screening: covered annually if you're between the age of 12-76  Also covered annually if you are younger than 13 and older than 72 with risk factors for HIV infection  For pregnant patients, it is covered up to 3 times per pregnancy  Immunizations:  Immunization Recommendations   Influenza Vaccine Annual influenza vaccination during flu season is recommended for all persons aged >= 6 months who do not have contraindications   Pneumococcal Vaccine (Prevnar and Pneumovax)  * Prevnar = PCV13  * Pneumovax = PPSV23   Adults 25-60 years old: 1-3 doses may be recommended based on certain risk factors  Adults 72 years old: Prevnar (PCV13) vaccine recommended followed by Pneumovax (PPSV23) vaccine  If already received PPSV23 since turning 65, then PCV13 recommended at least one year after PPSV23 dose  Hepatitis B Vaccine 3 dose series if at intermediate or high risk (ex: diabetes, end stage renal disease, liver disease)   Tetanus (Td) Vaccine - COST NOT COVERED BY MEDICARE PART B Following completion of primary series, a booster dose should be given every 10 years to maintain immunity against tetanus  Td may also be given as tetanus wound prophylaxis  Tdap Vaccine - COST NOT COVERED BY MEDICARE PART B Recommended at least once for all adults  For pregnant patients, recommended with each pregnancy  Shingles Vaccine (Shingrix) - COST NOT COVERED BY MEDICARE PART B  2 shot series recommended in those aged 48 and above     Health Maintenance Due:      Topic Date Due    CRC Screening: Colonoscopy  03/20/2020 (Originally 1945)     Immunizations Due:      Topic Date Due    Pneumococcal Vaccine: 65+ Years (1 of 2 - PCV13) 07/26/2008     Advance Directives   What are advance directives? Advance directives are legal documents that state your wishes and plans for medical care   These plans are made ahead of time in case you lose your ability to make decisions for yourself  Advance directives can apply to any medical decision, such as the treatments you want, and if you want to donate organs  What are the types of advance directives? There are many types of advance directives, and each state has rules about how to use them  You may choose a combination of any of the following:  · Living will: This is a written record of the treatment you want  You can also choose which treatments you do not want, which to limit, and which to stop at a certain time  This includes surgery, medicine, IV fluid, and tube feedings  · Durable power of  for healthcare [facility]): This is a written record that states who you want to make healthcare choices for you when you are unable to make them for yourself  This person, called a proxy, is usually a family member or a friend  You may choose more than 1 proxy  · Do not resuscitate (DNR) order:  A DNR order is used in case your heart stops beating or you stop breathing  It is a request not to have certain forms of treatment, such as CPR  A DNR order may be included in other types of advance directives  · Medical directive: This covers the care that you want if you are in a coma, near death, or unable to make decisions for yourself  You can list the treatments you want for each condition  Treatment may include pain medicine, surgery, blood transfusions, dialysis, IV or tube feedings, and a ventilator (breathing machine)  · Values history: This document has questions about your views, beliefs, and how you feel and think about life  This information can help others choose the care that you would choose  Why are advance directives important? An advance directive helps you control your care  Although spoken wishes may be used, it is better to have your wishes written down  Spoken wishes can be misunderstood, or not followed  Treatments may be given even if you do not want them   An advance directive may make it easier for your family to make difficult choices about your care  Urinary Incontinence   Urinary incontinence (UI)  is when you lose control of your bladder  UI develops because your bladder cannot store or empty urine properly  The 3 most common types of UI are stress incontinence, urge incontinence, or both  Medicines:   · May be given to help strengthen your bladder control  Report any side effects of medication to your healthcare provider  Do pelvic muscle exercises often:  Your pelvic muscles help you stop urinating  Squeeze these muscles tight for 5 seconds, then relax for 5 seconds  Gradually work up to squeezing for 10 seconds  Do 3 sets of 15 repetitions a day, or as directed  This will help strengthen your pelvic muscles and improve bladder control  Train your bladder:  Go to the bathroom at set times, such as every 2 hours, even if you do not feel the urge to go  You can also try to hold your urine when you feel the urge to go  For example, hold your urine for 5 minutes when you feel the urge to go  As that becomes easier, hold your urine for 10 minutes  Self-care:   · Keep a UI record  Write down how often you leak urine and how much you leak  Make a note of what you were doing when you leaked urine  · Drink liquids as directed  You may need to limit the amount of liquid you drink to help control your urine leakage  Do not drink any liquid right before you go to bed  Limit or do not have drinks that contain caffeine or alcohol  · Prevent constipation  Eat a variety of high-fiber foods  Good examples are high-fiber cereals, beans, vegetables, and whole-grain breads  Walking is the best way to trigger your intestines to have a bowel movement  · Exercise regularly and maintain a healthy weight  Weight loss and exercise will decrease pressure on your bladder and help you control your leakage  · Use a catheter as directed  to help empty your bladder   A catheter is a tiny, plastic tube that is put into your bladder to drain your urine  · Go to behavior therapy as directed  Behavior therapy may be used to help you learn to control your urge to urinate  © Copyright [employer] 2018 Information is for End User's use only and may not be sold, redistributed or otherwise used for commercial purposes   All illustrations and images included in CareNotes® are the copyrighted property of [employer]  or [employer]

## 2019-12-13 NOTE — PROGRESS NOTES
Assessment and plan:       1  Urinary urgency and frequency - managed by Dr Ghassan Kothari  - Well controlled on oxybutynin 5mg twice daily  Patient denies any adverse reactions  - She is emptying well with a PVR of 49 mL  - We did discuss that there is a risk of confusion with taking oxybutynin  The patient has not noticed any side effects from this medication at this point    - She is comfortable seeking refills of this medication through her primary care provider, who originally provided a prescription     2  History pyelonephritis  - She has been asymptomatic for the last 18 months  She will follow-up with our office in the future on an as-needed basis  Abby Allen PA-C      Chief Complaint     Chief Complaint   Patient presents with    Urinary Urgency         History of Present Illness     Kinza Marr is a 66 y o  female patient of Dr Ghassan Kothari with a history pyelonephritis presenting for follow-up      Patient was in the hospital in May 2018 for pyelonephritis  She was noted to have mild left hydronephrosis at that time without evidence of an obstructing stone  Patient does have a history of kidney stones however  A repeat ultrasound the kidney and bladder was performed shortly thereafter which revealed resolution of hydronephrosis  She has a history of urinary urgency and frequency as well which was managed on Enablex 15mg daily  She was recently transitioned to oxybutynin 5 mg immediate release b i d  Due to insurance coverage  She continues to have adequate symptom control on this medication  She has not noticed any side effects from this medication  She does report that she was hospitalized a few months ago for a urinary tract infection  This was treated without complication and she is feeling well today  She is emptying adequately today with a PVR of 49 mL         Laboratory     Lab Results   Component Value Date    CREATININE 0 99 09/27/2019       No results found for: PSA    Recent Results (from the past 1 hour(s))   POCT Measure PVR    Collection Time: 12/16/19  2:41 PM   Result Value Ref Range    POST-VOID RESIDUAL VOLUME, ML POC 49 mL         Review of Systems     Review of Systems   Constitutional: Negative for chills and fever  HENT: Negative  Eyes: Negative  Respiratory: Negative for shortness of breath  Cardiovascular: Negative for chest pain  Gastrointestinal: Negative for constipation, diarrhea, nausea and vomiting  Genitourinary: Negative for difficulty urinating, dysuria, enuresis, flank pain, frequency, hematuria and urgency  Musculoskeletal: Negative  Urinary Incontinence Screening      Most Recent Value   Urinary Incontinence   Urinary Incontinence? No   Incomplete emptying? No   Urinary frequency? No   Urinary urgency? No   Urinary hesitancy? No   Dysuria (painful difficult urination)? No   Nocturia (waking up to use the bathroom)? Yes [1x]   Straining (having to push to go)? No   Weak stream?  No   Intermittent stream?  No   Post void dribbling? No                Allergies     Allergies   Allergen Reactions    Latex Rash    Penicillin G Rash       Physical Exam     Physical Exam   Constitutional: She is oriented to person, place, and time  She appears well-developed and well-nourished  No distress  HENT:   Head: Normocephalic and atraumatic  Right Ear: External ear normal    Left Ear: External ear normal    Nose: Nose normal    Eyes: Right eye exhibits no discharge  Left eye exhibits no discharge  No scleral icterus  Cardiovascular: Normal rate and regular rhythm  Pulmonary/Chest: Effort normal    Genitourinary:   Genitourinary Comments: Negative for CVA tenderness bilaterally   Musculoskeletal:   Ambulates independently   Neurological: She is alert and oriented to person, place, and time  Skin: Skin is warm and dry  She is not diaphoretic  Psychiatric: She has a normal mood and affect   Her behavior is normal  Judgment and thought content normal    Nursing note and vitals reviewed          Vital Signs     Vitals:    12/16/19 1430   BP: 120/77   Pulse: 77   Weight: 54 2 kg (119 lb 6 4 oz)   Height: 5' 1" (1 549 m)         Current Medications       Current Outpatient Medications:     amLODIPine (NORVASC) 5 mg tablet, Take 1 tablet (5 mg total) by mouth daily, Disp: 90 tablet, Rfl: 1    atorvastatin (LIPITOR) 10 mg tablet, Take 1 tablet (10 mg total) by mouth daily, Disp: 90 tablet, Rfl: 2    levocetirizine (XYZAL) 5 MG tablet, , Disp: , Rfl:     lisinopril (ZESTRIL) 10 mg tablet, Take 1 tablet (10 mg total) by mouth daily, Disp: 90 tablet, Rfl: 2    metFORMIN (GLUCOPHAGE) 500 mg tablet, Take 1 tablet (500 mg total) by mouth 2 (two) times a day with meals, Disp: 180 tablet, Rfl: 2    oxybutynin (DITROPAN) 5 mg tablet, Take 1 tablet (5 mg total) by mouth 2 (two) times a day, Disp: 90 tablet, Rfl: 2    aspirin (ECOTRIN LOW STRENGTH) 81 mg EC tablet, Take 1 tablet (81 mg total) by mouth daily for 90 days, Disp: 90 tablet, Rfl: 0    olopatadine (PATANOL) 0 1 % ophthalmic solution, Administer 1 drop to both eyes 2 (two) times a day for 5 days, Disp: 5 mL, Rfl: 2      Active Problems     Patient Active Problem List   Diagnosis    Type 2 diabetes mellitus without complication, without long-term current use of insulin ([facility])    UTI (urinary tract infection)    Hypokalemia    Transition of care performed with sharing of clinical summary    Essential hypertension    CKD (chronic kidney disease) stage 3, GFR 30-59 ml/min ([facility])    Mixed hyperlipidemia    Microcytic anemia    Influenza vaccination declined by patient    Seasonal allergies    Allergic conjunctivitis of both eyes    Fever    Seborrheic keratosis    Squamous cell cancer of skin of left hand    Actinic keratosis    OAB (overactive bladder)    Medicare annual wellness visit, initial    Need for pneumococcal vaccination         Past Medical History Past Medical History:   Diagnosis Date    Diabetes mellitus ([facility] [address] )     Patient states "I quit my medication"    Eczema     Hypertension     Malignant neoplasm of skin     LAST ASSESSED: [id number]    Nonmelanoma skin cancer     LAST ASSESSED: [id number]    Renal disorder     stage 3         Surgical History     Past Surgical History:   Procedure Laterality Date    EYE SURGERY      catarcts removal both    SKIN BIOPSY      face and arm         Family History     Family History   Problem Relation Age of Onset    Diabetes Mother     Skin cancer Mother          Social History     Social History       Radiology

## 2019-12-16 ENCOUNTER — OFFICE VISIT (OUTPATIENT)
Dept: UROLOGY | Facility: CLINIC | Age: 74
End: 2019-12-16
Payer: MEDICARE

## 2019-12-16 VITALS
HEART RATE: 77 BPM | WEIGHT: 119.4 LBS | DIASTOLIC BLOOD PRESSURE: 77 MMHG | HEIGHT: 61 IN | SYSTOLIC BLOOD PRESSURE: 120 MMHG | BODY MASS INDEX: 22.54 KG/M2

## 2019-12-16 DIAGNOSIS — N39.41 URGE INCONTINENCE: Primary | ICD-10-CM

## 2019-12-16 LAB — POST-VOID RESIDUAL VOLUME, ML POC: 49 ML

## 2019-12-16 PROCEDURE — 99213 OFFICE O/P EST LOW 20 MIN: CPT | Performed by: PHYSICIAN ASSISTANT

## 2019-12-16 PROCEDURE — 51798 US URINE CAPACITY MEASURE: CPT | Performed by: PHYSICIAN ASSISTANT

## 2019-12-30 DIAGNOSIS — H10.13 ALLERGIC CONJUNCTIVITIS OF BOTH EYES: ICD-10-CM

## 2019-12-30 RX ORDER — OLOPATADINE HYDROCHLORIDE 1 MG/ML
SOLUTION/ DROPS OPHTHALMIC
Qty: 5 ML | Refills: 0 | Status: SHIPPED | OUTPATIENT
Start: 2019-12-30 | End: 2019-12-31

## 2019-12-31 ENCOUNTER — TELEPHONE (OUTPATIENT)
Dept: FAMILY MEDICINE CLINIC | Facility: CLINIC | Age: 74
End: 2019-12-31

## 2019-12-31 DIAGNOSIS — J30.2 SEASONAL ALLERGIES: Primary | ICD-10-CM

## 2019-12-31 RX ORDER — KETOTIFEN FUMARATE 0.35 MG/ML
1 SOLUTION/ DROPS OPHTHALMIC 2 TIMES DAILY
Qty: 5 ML | Refills: 2 | Status: SHIPPED | OUTPATIENT
Start: 2019-12-31 | End: 2020-02-25

## 2020-02-15 LAB — HBA1C MFR BLD HPLC: 6.3 %

## 2020-02-16 NOTE — PROGRESS NOTES
Assessment and Plan:     Problem List Items Addressed This Visit        Endocrine    Type 2 diabetes mellitus without complication, without long-term current use of insulin ([facility])    Relevant Medications    metFORMIN (GLUCOPHAGE) 500 mg tablet    Other Relevant Orders    Comprehensive metabolic panel    Hemoglobin A1C    Lipid panel       Cardiovascular and Mediastinum    Essential hypertension    Relevant Medications    lisinopril (ZESTRIL) 10 mg tablet    amLODIPine (NORVASC) 5 mg tablet    Other Relevant Orders    Comprehensive metabolic panel    Hemoglobin A1C    Lipid panel       Genitourinary    UTI (urinary tract infection)    Relevant Orders    Comprehensive metabolic panel    Hemoglobin A1C    Lipid panel    CKD (chronic kidney disease) stage 3, GFR 30-59 ml/min ([facility])    Relevant Orders    Comprehensive metabolic panel    Hemoglobin A1C    Lipid panel    OAB (overactive bladder)    Relevant Medications    oxybutynin (DITROPAN) 5 mg tablet    Other Relevant Orders    Comprehensive metabolic panel    Hemoglobin A1C    Lipid panel       Other    Transition of care performed with sharing of clinical summary - Primary    Relevant Orders    Comprehensive metabolic panel    Hemoglobin A1C    Lipid panel    Mixed hyperlipidemia    Relevant Medications    atorvastatin (LIPITOR) 10 mg tablet    Other Relevant Orders    Comprehensive metabolic panel    Hemoglobin A1C    Lipid panel    Allergic conjunctivitis of both eyes    Relevant Medications    olopatadine (PATANOL) 0 1 % ophthalmic solution    Other Relevant Orders    Comprehensive metabolic panel    Hemoglobin A1C    Lipid panel    Medicare annual wellness visit, initial           Preventive health issues were discussed with patient, and age appropriate screening tests were ordered as noted in patient's After Visit Summary    Personalized health advice and appropriate referrals for health education or preventive services given if needed, as noted in patient's After Visit Summary  History of Present Illness:     Patient presents for Medicare Annual Wellness visit    Patient Care Team:  Jose Raul Armstrong as PCP - General (Nurse Practitioner)  MD Charlie Kerr, [address] [place] [address] (Nurse Practitioner)     Problem List:     Patient Active Problem List   Diagnosis    Type 2 diabetes mellitus without complication, without long-term current use of insulin ([facility] )    UTI (urinary tract infection)    Hypokalemia    Transition of care performed with sharing of clinical summary    Essential hypertension    CKD (chronic kidney disease) stage 3, GFR 30-59 ml/min ([facility])    Mixed hyperlipidemia    Microcytic anemia    Influenza vaccination declined by patient    Seasonal allergies    Allergic conjunctivitis of both eyes    Fever    Seborrheic keratosis    Squamous cell cancer of skin of left hand    Actinic keratosis    OAB (overactive bladder)    Medicare annual wellness visit, initial      Past Medical and Surgical History:     Past Medical History:   Diagnosis Date    Diabetes mellitus ([facility] )     Patient states "I quit my medication"    Eczema     Hypertension     Malignant neoplasm of skin     LAST ASSESSED: [id number]    Nonmelanoma skin cancer     LAST ASSESSED: [id number]    Renal disorder     stage 3     Past Surgical History:   Procedure Laterality Date    EYE SURGERY      catarcts removal both    SKIN BIOPSY      face and arm      Family History:     Family History   Problem Relation Age of Onset    Diabetes Mother     Skin cancer Mother       Social History:     Social History     Socioeconomic History    Marital status:       Spouse name: None    Number of children: None    Years of education: None    Highest education level: None   Occupational History    None   Social Needs    Financial resource strain: None    Food insecurity:     Worry: None     Inability: None    Transportation needs:     Medical: None     Non-medical: None Tobacco Use    Smoking status: Never Smoker    Smokeless tobacco: Never Used   Substance and Sexual Activity    Alcohol use: Not Currently     Alcohol/week: 0 0 standard drinks     Frequency: Never     Drinks per session: 1 or 2     Binge frequency: Never    Drug use: No    Sexual activity: None   Lifestyle    Physical activity:     Days per week: None     Minutes per session: None    Stress: None   Relationships    Social connections:     Talks on phone: None     Gets together: None     Attends [employer] service: None     Active member of club or organization: None     Attends meetings of clubs or organizations: None     Relationship status: None    Intimate partner violence:     Fear of current or ex partner: None     Emotionally abused: None     Physically abused: None     Forced sexual activity: None   Other Topics Concern    None   Social History Narrative    None       Medications and Allergies:     Current Outpatient Medications   Medication Sig Dispense Refill    levocetirizine (XYZAL) 5 MG tablet       amLODIPine (NORVASC) 5 mg tablet Take 1 tablet (5 mg total) by mouth daily 90 tablet 1    aspirin (ECOTRIN LOW STRENGTH) 81 mg EC tablet Take 1 tablet (81 mg total) by mouth daily for 90 days 90 tablet 0    atorvastatin (LIPITOR) 10 mg tablet Take 1 tablet (10 mg total) by mouth daily 90 tablet 2    cefdinir (OMNICEF) 300 mg capsule Take 1 capsule (300 mg total) by mouth every 12 (twelve) hours for 7 days 14 capsule 0    lisinopril (ZESTRIL) 10 mg tablet Take 1 tablet (10 mg total) by mouth daily 90 tablet 2    metFORMIN (GLUCOPHAGE) 500 mg tablet Take 1 tablet (500 mg total) by mouth 2 (two) times a day with meals 180 tablet 2    olopatadine (PATANOL) 0 1 % ophthalmic solution Administer 1 drop to both eyes 2 (two) times a day for 5 days 5 mL 2    oxybutynin (DITROPAN) 5 mg tablet Take 1 tablet (5 mg total) by mouth 2 (two) times a day 90 tablet 2     No current facility-administered medications for this visit  Allergies   Allergen Reactions    Latex Rash    Penicillin G Rash      Immunizations: There is no immunization history on file for this patient  Health Maintenance:         Topic Date Due    CRC Screening: Colonoscopy  03/20/2020 (Originally 1945)         Topic Date Due    Pneumococcal Vaccine: 65+ Years (1 of 2 - PCV13) 07/26/2008      Medicare Health Risk Assessment:     /82 (BP Location: Left arm, Patient Position: Sitting)   Pulse 72   Resp 18   Ht 5' 1" (1 549 m)   Wt 53 5 kg (118 lb)   SpO2 97%   BMI 22 30 kg/m²      Awais Dickinson is here for her Initial Wellness visit  Health Risk Assessment:   Patient rates overall health as good  Patient feels that their physical health rating is slightly better  Eyesight was rated as same  Hearing was rated as same  Patient feels that their emotional and mental health rating is same  Pain experienced in the last 7 days has been none  Patient states that she has experienced no weight loss or gain in last 6 months  Fall Risk Screening: In the past year, patient has experienced: no history of falling in past year      Urinary Incontinence Screening:   Patient has leaked urine accidently in the last six months  Home Safety:  Patient does not have trouble with stairs inside or outside of their home  Patient has working smoke alarms and has working carbon monoxide detector  Home safety hazards include: none  Nutrition:   Current diet is Diabetic  Medications:   Patient is not currently taking any over-the-counter supplements  Patient is able to manage medications  Activities of Daily Living (ADLs)/Instrumental Activities of Daily Living (IADLs):   Walk and transfer into and out of bed and chair?: Yes  Dress and groom yourself?: Yes    Bathe or shower yourself?: Yes    Feed yourself?  Yes  Do your laundry/housekeeping?: Yes  Manage your money, pay your bills and track your expenses?: Yes  Make your own meals?: Yes    Do your own shopping?: Yes    Previous Hospitalizations:   Any hospitalizations or ED visits within the last 12 months?: Yes    How many hospitalizations have you had in the last year?: 1-2    Advance Care Planning:   Living will: No    Durable POA for healthcare: No    Advanced directive: No    Advanced directive counseling given: Yes    Five wishes given: Yes    Patient declined ACP directive: No    End of Life Decisions reviewed with patient: Yes    Provider agrees with end of life decisions: No      Cognitive Screening:   Provider or family/friend/caregiver concerned regarding cognition?: No    PREVENTIVE SCREENINGS      Cardiovascular Screening:    General: Screening Not Indicated and History Lipid Disorder      Diabetes Screening:     General: Screening Not Indicated and History Diabetes      Colorectal Cancer Screening:     General: Screening Current      Breast Cancer Screening:     General: Patient Declines      Cervical Cancer Screening:    General: Screening Not Indicated      Osteoporosis Screening:    General: Patient Declines      Abdominal Aortic Aneurysm (AAA) Screening:        General: Screening Not Indicated      Lung Cancer Screening:     General: Screening Not Indicated      Hepatitis C Screening:    General: Patient Declines and Risks and Benefits Discussed    Hep C Screening Accepted: No     Other Counseling Topics:   Car/seat belt/driving safety, skin self-exam, sunscreen and regular weightbearing exercise and calcium and vitamin D intake         MEMO Maldonado Declined

## 2020-02-20 DIAGNOSIS — J30.2 SEASONAL ALLERGIES: Primary | ICD-10-CM

## 2020-02-21 RX ORDER — LEVOCETIRIZINE DIHYDROCHLORIDE 5 MG/1
TABLET, FILM COATED ORAL
Qty: 30 TABLET | Refills: 0 | Status: SHIPPED | OUTPATIENT
Start: 2020-02-21 | End: 2020-09-23

## 2020-02-23 NOTE — PROGRESS NOTES
Assessment/Plan:       Diagnoses and all orders for this visit:    Type 2 diabetes mellitus without complication, without long-term current use of insulin (HCC)  -     Comprehensive metabolic panel; Future  -     Lipid panel; Future  -     Hemoglobin A1C; Future  -     Microalbumin / creatinine urine ratio    Essential hypertension  -     Comprehensive metabolic panel; Future  -     Lipid panel; Future    CKD (chronic kidney disease) stage 3, GFR 30-59 ml/min (HCC)  -     Comprehensive metabolic panel; Future  -     Lipid panel; Future    OAB (overactive bladder)  -     Comprehensive metabolic panel; Future  -     Lipid panel; Future    Mixed hyperlipidemia  -     Comprehensive metabolic panel; Future  -     Lipid panel; Future    Hypokalemia  -     Comprehensive metabolic panel; Future  -     Lipid panel; Future    Other orders  -     atorvastatin (LIPITOR) 10 mg tablet; Take 10 mg by mouth daily  -     oxybutynin (DITROPAN) 5 mg tablet; Take 5 mg by mouth 2 (two) times a day        No problem-specific Assessment & Plan notes found for this encounter  Subjective:      Patient ID: Ashlee Salcedo is a 66 y o  female  Patient is here for follow up of chronic medical conditions  htn-STABLE  ckd-labs were cancelled for unknown reason  Dm-A1C is 6 3  She has eye doc appmnt in [place]  oab-stable on current medication  hld-labs cancelled by unknown reason  Prior decreased potassium-needs recheck of serum       The following portions of the patient's history were reviewed and updated as appropriate:   She has a past medical history of Diabetes mellitus ([address] ), Eczema, Hypertension, Malignant neoplasm of skin, Nonmelanoma skin cancer, and Renal disorder  ,  does not have any pertinent problems on file  ,   has a past surgical history that includes Eye surgery and Skin biopsy  ,  family history includes Diabetes in her mother; Skin cancer in her mother  ,   reports that she has never smoked   She has never used smokeless tobacco  She reports that she drank alcohol  She reports that she does not use drugs  ,  is allergic to latex and penicillin g   Current Outpatient Medications   Medication Sig Dispense Refill    amLODIPine (NORVASC) 5 mg tablet Take 1 tablet (5 mg total) by mouth daily 90 tablet 1    aspirin (ECOTRIN LOW STRENGTH) 81 mg EC tablet Take 1 tablet (81 mg total) by mouth daily for 90 days 90 tablet 0    atorvastatin (LIPITOR) 10 mg tablet Take 10 mg by mouth daily      ketotifen (ZADITOR) 0 025 % ophthalmic solution Administer 1 drop to both eyes 2 (two) times a day 5 mL 2    levocetirizine (XYZAL) 5 MG tablet TAKE 1 TABLET BY MOUTH ONCE DAILY IN THE EVENING 30 tablet 0    lisinopril (ZESTRIL) 10 mg tablet Take 1 tablet (10 mg total) by mouth daily 90 tablet 2    metFORMIN (GLUCOPHAGE) 500 mg tablet Take 1 tablet (500 mg total) by mouth 2 (two) times a day with meals 180 tablet 2    oxybutynin (DITROPAN) 5 mg tablet Take 5 mg by mouth 2 (two) times a day       No current facility-administered medications for this visit  Review of Systems   Constitutional: Negative  Negative for fatigue and fever  HENT: Negative  Negative for congestion  Eyes: Negative  Negative for visual disturbance  Respiratory: Negative for cough, chest tightness, shortness of breath and wheezing  Cardiovascular: Negative  Negative for chest pain, palpitations and leg swelling  Gastrointestinal: Negative  Negative for abdominal distention, abdominal pain, blood in stool, diarrhea and nausea  Endocrine: Negative for polydipsia, polyphagia and polyuria  Genitourinary: Negative for difficulty urinating and flank pain  Musculoskeletal: Negative  Negative for arthralgias, back pain and myalgias  Skin: Negative  Negative for color change, pallor and rash  Allergic/Immunologic: Negative for immunocompromised state  Neurological: Negative    Negative for dizziness, weakness, light-headedness, numbness and headaches  Hematological: Negative for adenopathy  Psychiatric/Behavioral: Negative  Negative for confusion, decreased concentration and sleep disturbance  All other systems reviewed and are negative  Objective:  Vitals:    02/25/20 1214   BP: 136/78   BP Location: Left arm   Patient Position: Sitting   Pulse: 88   Resp: 18   Temp: 98 2 °F (36 8 °C)   SpO2: 98%   Weight: 54 kg (119 lb)   Height: 5' 1" (1 549 m)     Body mass index is 22 48 kg/m²  Physical Exam   Constitutional: She is oriented to person, place, and time  She appears well-developed and well-nourished  No distress  HENT:   Head: Normocephalic and atraumatic  Right Ear: External ear normal    Left Ear: External ear normal    Nose: Nose normal    Mouth/Throat: Oropharynx is clear and moist  No oropharyngeal exudate  Eyes: Pupils are equal, round, and reactive to light  Conjunctivae are normal  No scleral icterus  Neck: Normal range of motion  Neck supple  No JVD present  No thyromegaly present  Cardiovascular: Normal rate, regular rhythm, normal heart sounds and intact distal pulses  Exam reveals no gallop and no friction rub  No murmur heard  Pulmonary/Chest: Effort normal and breath sounds normal  No respiratory distress  She exhibits no tenderness  Abdominal: Soft  Bowel sounds are normal  She exhibits no distension  There is no tenderness  Musculoskeletal: Normal range of motion  She exhibits edema  ble nonpitting edema   Lymphadenopathy:     She has no cervical adenopathy  Neurological: She is alert and oriented to person, place, and time  No sensory deficit  Coordination normal    Skin: Skin is warm and dry  Capillary refill takes less than 2 seconds  No rash noted  She is not diaphoretic  Psychiatric: She has a normal mood and affect  Her behavior is normal  Judgment and thought content normal    Nursing note and vitals reviewed

## 2020-02-25 ENCOUNTER — OFFICE VISIT (OUTPATIENT)
Dept: FAMILY MEDICINE CLINIC | Facility: CLINIC | Age: 75
End: 2020-02-25
Payer: MEDICARE

## 2020-02-25 VITALS
BODY MASS INDEX: 22.47 KG/M2 | HEIGHT: 61 IN | HEART RATE: 88 BPM | SYSTOLIC BLOOD PRESSURE: 136 MMHG | WEIGHT: 119 LBS | DIASTOLIC BLOOD PRESSURE: 78 MMHG | TEMPERATURE: 98.2 F | OXYGEN SATURATION: 98 % | RESPIRATION RATE: 18 BRPM

## 2020-02-25 DIAGNOSIS — N18.30 CKD (CHRONIC KIDNEY DISEASE) STAGE 3, GFR 30-59 ML/MIN (HCC): ICD-10-CM

## 2020-02-25 DIAGNOSIS — E78.2 MIXED HYPERLIPIDEMIA: ICD-10-CM

## 2020-02-25 DIAGNOSIS — N32.81 OAB (OVERACTIVE BLADDER): ICD-10-CM

## 2020-02-25 DIAGNOSIS — E11.9 TYPE 2 DIABETES MELLITUS WITHOUT COMPLICATION, WITHOUT LONG-TERM CURRENT USE OF INSULIN (HCC): Primary | ICD-10-CM

## 2020-02-25 DIAGNOSIS — I10 ESSENTIAL HYPERTENSION: ICD-10-CM

## 2020-02-25 DIAGNOSIS — E87.6 HYPOKALEMIA: ICD-10-CM

## 2020-02-25 PROCEDURE — 3008F BODY MASS INDEX DOCD: CPT | Performed by: NURSE PRACTITIONER

## 2020-02-25 PROCEDURE — 1036F TOBACCO NON-USER: CPT | Performed by: NURSE PRACTITIONER

## 2020-02-25 PROCEDURE — 3075F SYST BP GE 130 - 139MM HG: CPT | Performed by: NURSE PRACTITIONER

## 2020-02-25 PROCEDURE — 3066F NEPHROPATHY DOC TX: CPT | Performed by: NURSE PRACTITIONER

## 2020-02-25 PROCEDURE — 1160F RVW MEDS BY RX/DR IN RCRD: CPT | Performed by: NURSE PRACTITIONER

## 2020-02-25 PROCEDURE — 4040F PNEUMOC VAC/ADMIN/RCVD: CPT | Performed by: NURSE PRACTITIONER

## 2020-02-25 PROCEDURE — 3078F DIAST BP <80 MM HG: CPT | Performed by: NURSE PRACTITIONER

## 2020-02-25 PROCEDURE — 4010F ACE/ARB THERAPY RXD/TAKEN: CPT | Performed by: NURSE PRACTITIONER

## 2020-02-25 PROCEDURE — 3044F HG A1C LEVEL LT 7.0%: CPT | Performed by: NURSE PRACTITIONER

## 2020-02-25 PROCEDURE — 99214 OFFICE O/P EST MOD 30 MIN: CPT | Performed by: NURSE PRACTITIONER

## 2020-02-25 RX ORDER — OXYBUTYNIN CHLORIDE 5 MG/1
5 TABLET ORAL 2 TIMES DAILY
COMMUNITY
Start: 2020-02-21 | End: 2020-04-07 | Stop reason: SDUPTHER

## 2020-02-25 RX ORDER — LISINOPRIL 10 MG/1
10 TABLET ORAL DAILY
Qty: 90 TABLET | Refills: 2 | Status: SHIPPED | OUTPATIENT
Start: 2020-02-25 | End: 2020-05-25

## 2020-02-25 RX ORDER — ATORVASTATIN CALCIUM 10 MG/1
10 TABLET, FILM COATED ORAL DAILY
COMMUNITY
Start: 2020-02-21 | End: 2020-07-29

## 2020-02-25 NOTE — PATIENT INSTRUCTIONS
Follow up visit:    htn-STABLE  ckd-labs were cancelled for unknown reason  Dm-A1C is 6 3  Going to eye doctor in March  oab-stable on current medication  hld-labs cancelled by unknown reason  Prior decreased potassium-needs recheck of serum   Obtain labs in 4 months and follow up in office

## 2020-04-07 DIAGNOSIS — N32.81 OAB (OVERACTIVE BLADDER): Primary | ICD-10-CM

## 2020-04-07 RX ORDER — OXYBUTYNIN CHLORIDE 5 MG/1
5 TABLET ORAL 2 TIMES DAILY
Qty: 90 TABLET | Refills: 3 | Status: SHIPPED | OUTPATIENT
Start: 2020-04-07 | End: 2021-03-01

## 2020-05-05 ENCOUNTER — TELEPHONE (OUTPATIENT)
Dept: FAMILY MEDICINE CLINIC | Facility: CLINIC | Age: 75
End: 2020-05-05

## 2020-07-29 DIAGNOSIS — E78.2 MIXED HYPERLIPIDEMIA: Primary | ICD-10-CM

## 2020-07-29 RX ORDER — ATORVASTATIN CALCIUM 10 MG/1
TABLET, FILM COATED ORAL
Qty: 90 TABLET | Refills: 0 | Status: SHIPPED | OUTPATIENT
Start: 2020-07-29

## 2020-07-31 ENCOUNTER — APPOINTMENT (EMERGENCY)
Dept: RADIOLOGY | Facility: HOSPITAL | Age: 75
End: 2020-07-31
Payer: MEDICARE

## 2020-07-31 ENCOUNTER — HOSPITAL ENCOUNTER (EMERGENCY)
Facility: HOSPITAL | Age: 75
Discharge: HOME | End: 2020-07-31
Attending: EMERGENCY MEDICINE | Admitting: EMERGENCY MEDICINE
Payer: MEDICARE

## 2020-07-31 VITALS
DIASTOLIC BLOOD PRESSURE: 65 MMHG | HEIGHT: 61 IN | WEIGHT: 120 LBS | OXYGEN SATURATION: 95 % | HEART RATE: 68 BPM | BODY MASS INDEX: 22.66 KG/M2 | TEMPERATURE: 99.5 F | RESPIRATION RATE: 16 BRPM | SYSTOLIC BLOOD PRESSURE: 154 MMHG

## 2020-07-31 DIAGNOSIS — R10.9 FLANK PAIN: ICD-10-CM

## 2020-07-31 DIAGNOSIS — R03.0 ELEVATED BLOOD PRESSURE READING: ICD-10-CM

## 2020-07-31 DIAGNOSIS — N39.0 UTI (URINARY TRACT INFECTION): Primary | ICD-10-CM

## 2020-07-31 LAB
ALBUMIN SERPL BCP-MCNC: 3.8 G/DL (ref 3.5–5)
ALP SERPL-CCNC: 55 U/L (ref 46–116)
ALT SERPL W P-5'-P-CCNC: 19 U/L (ref 12–78)
ANION GAP SERPL CALCULATED.3IONS-SCNC: 10 MMOL/L (ref 4–13)
AST SERPL W P-5'-P-CCNC: 14 U/L (ref 5–45)
BACTERIA UR QL AUTO: ABNORMAL /HPF
BASOPHILS # BLD AUTO: 0.05 THOUSANDS/ΜL (ref 0–0.1)
BASOPHILS NFR BLD AUTO: 1 % (ref 0–1)
BILIRUB SERPL-MCNC: 0.3 MG/DL (ref 0.2–1)
BILIRUB UR QL STRIP: NEGATIVE
BUN SERPL-MCNC: 17 MG/DL (ref 5–25)
CALCIUM SERPL-MCNC: 9.3 MG/DL (ref 8.3–10.1)
CHLORIDE SERPL-SCNC: 104 MMOL/L (ref 100–108)
CLARITY UR: CLEAR
CO2 SERPL-SCNC: 26 MMOL/L (ref 21–32)
COLOR UR: ABNORMAL
CREAT SERPL-MCNC: 1.13 MG/DL (ref 0.6–1.3)
EOSINOPHIL # BLD AUTO: 0.1 THOUSAND/ΜL (ref 0–0.61)
EOSINOPHIL NFR BLD AUTO: 2 % (ref 0–6)
ERYTHROCYTE [DISTWIDTH] IN BLOOD BY AUTOMATED COUNT: 13.8 % (ref 11.6–15.1)
GFR SERPL CREATININE-BSD FRML MDRD: 47 ML/MIN/1.73SQ M
GLUCOSE SERPL-MCNC: 133 MG/DL (ref 65–140)
GLUCOSE UR STRIP-MCNC: NEGATIVE MG/DL
HCT VFR BLD AUTO: 41.7 % (ref 34.8–46.1)
HGB BLD-MCNC: 13.3 G/DL (ref 11.5–15.4)
HGB UR QL STRIP.AUTO: NEGATIVE
IMM GRANULOCYTES # BLD AUTO: 0.02 THOUSAND/UL (ref 0–0.2)
IMM GRANULOCYTES NFR BLD AUTO: 0 % (ref 0–2)
KETONES UR STRIP-MCNC: NEGATIVE MG/DL
LEUKOCYTE ESTERASE UR QL STRIP: ABNORMAL
LYMPHOCYTES # BLD AUTO: 2.46 THOUSANDS/ΜL (ref 0.6–4.47)
LYMPHOCYTES NFR BLD AUTO: 39 % (ref 14–44)
MCH RBC QN AUTO: 31.2 PG (ref 26.8–34.3)
MCHC RBC AUTO-ENTMCNC: 31.9 G/DL (ref 31.4–37.4)
MCV RBC AUTO: 98 FL (ref 82–98)
MONOCYTES # BLD AUTO: 0.63 THOUSAND/ΜL (ref 0.17–1.22)
MONOCYTES NFR BLD AUTO: 10 % (ref 4–12)
NEUTROPHILS # BLD AUTO: 3.05 THOUSANDS/ΜL (ref 1.85–7.62)
NEUTS SEG NFR BLD AUTO: 48 % (ref 43–75)
NITRITE UR QL STRIP: NEGATIVE
NON-SQ EPI CELLS URNS QL MICRO: ABNORMAL /HPF
NRBC BLD AUTO-RTO: 0 /100 WBCS
NT-PROBNP SERPL-MCNC: 69 PG/ML
OTHER STN SPEC: ABNORMAL
PH UR STRIP.AUTO: 5.5 [PH]
PLATELET # BLD AUTO: 247 THOUSANDS/UL (ref 149–390)
PMV BLD AUTO: 10.8 FL (ref 8.9–12.7)
POTASSIUM SERPL-SCNC: 4.3 MMOL/L (ref 3.5–5.3)
PROT SERPL-MCNC: 7.4 G/DL (ref 6.4–8.2)
PROT UR STRIP-MCNC: NEGATIVE MG/DL
RBC # BLD AUTO: 4.26 MILLION/UL (ref 3.81–5.12)
RBC #/AREA URNS AUTO: ABNORMAL /HPF
SODIUM SERPL-SCNC: 140 MMOL/L (ref 136–145)
SP GR UR STRIP.AUTO: <=1.005 (ref 1–1.03)
TROPONIN I SERPL-MCNC: <0.02 NG/ML
UROBILINOGEN UR QL STRIP.AUTO: 0.2 E.U./DL
WBC # BLD AUTO: 6.31 THOUSAND/UL (ref 4.31–10.16)
WBC #/AREA URNS AUTO: ABNORMAL /HPF

## 2020-07-31 PROCEDURE — 36415 COLL VENOUS BLD VENIPUNCTURE: CPT | Performed by: EMERGENCY MEDICINE

## 2020-07-31 PROCEDURE — 99284 EMERGENCY DEPT VISIT MOD MDM: CPT

## 2020-07-31 PROCEDURE — 85025 COMPLETE CBC W/AUTO DIFF WBC: CPT | Performed by: EMERGENCY MEDICINE

## 2020-07-31 PROCEDURE — 99284 EMERGENCY DEPT VISIT MOD MDM: CPT | Performed by: EMERGENCY MEDICINE

## 2020-07-31 PROCEDURE — 81001 URINALYSIS AUTO W/SCOPE: CPT | Performed by: EMERGENCY MEDICINE

## 2020-07-31 PROCEDURE — 71045 X-RAY EXAM CHEST 1 VIEW: CPT

## 2020-07-31 PROCEDURE — 83880 ASSAY OF NATRIURETIC PEPTIDE: CPT | Performed by: EMERGENCY MEDICINE

## 2020-07-31 PROCEDURE — 84484 ASSAY OF TROPONIN QUANT: CPT | Performed by: EMERGENCY MEDICINE

## 2020-07-31 PROCEDURE — 80053 COMPREHEN METABOLIC PANEL: CPT | Performed by: EMERGENCY MEDICINE

## 2020-07-31 RX ORDER — CEPHALEXIN 250 MG/1
500 CAPSULE ORAL ONCE
Status: COMPLETED | OUTPATIENT
Start: 2020-07-31 | End: 2020-07-31

## 2020-07-31 RX ORDER — CEPHALEXIN 250 MG/1
500 CAPSULE ORAL EVERY 6 HOURS SCHEDULED
Qty: 56 CAPSULE | Refills: 0 | Status: SHIPPED | OUTPATIENT
Start: 2020-07-31 | End: 2020-08-07

## 2020-07-31 RX ADMIN — CEPHALEXIN 500 MG: 250 CAPSULE ORAL at 18:30

## 2020-07-31 NOTE — ED NOTES
Discharge instructions and medications reviewed with pt  Pt verbalized understanding, with no further questions at this time  Pt ambulatory out of department, using steady gait, without assistance, alone to waiting room to await further transport home       Delano Dubin, RN  07/31/20 2431

## 2020-07-31 NOTE — ED PROVIDER NOTES
Pt Name: Mehul Angel  MRN: [id number]  [place] 1945  Age/Sex: 66 y o  female  Date of evaluation: 7/31/2020  PCP: Ritu Melgar, [address] [place] [address]    Chief Complaint   Patient presents with    Flank Pain     Patient presents to ED with co left sided flank pain and bilateral ankle swelling that started a few days ago  HPI    66 y o  female presenting with left-sided flank pain and bilateral ankle swelling  Patient states the ankle swelling has been an intermittent problem over months to years but the last few days seems to be more prominent than usual and has not gone away as quickly  She also complains of a mild dull pain to the left flank, unchanged with movement position or exertion, similar in character to prior urinary tract infections  She denies fever, nausea, vomiting, diarrhea, chest pain, shortness of breath, trauma, other symptoms  HPI      Past Medical and Surgical History    Past Medical History:   Diagnosis Date    Diabetes mellitus ([facility] [address] )     Patient states "I quit my medication"    Eczema     Hypertension     Malignant neoplasm of skin     LAST ASSESSED: [id number]    Nonmelanoma skin cancer     LAST ASSESSED: [id number]    Renal disorder     stage 3       Past Surgical History:   Procedure Laterality Date    EYE SURGERY      catarcts removal both    SKIN BIOPSY      face and arm       Family History   Problem Relation Age of Onset    Diabetes Mother     Skin cancer Mother        Social History     Tobacco Use    Smoking status: Never Smoker    Smokeless tobacco: Never Used   Substance Use Topics    Alcohol use: Not Currently     Alcohol/week: 0 0 standard drinks     Frequency: Never     Drinks per session: 1 or 2     Binge frequency: Never    Drug use: No           Allergies    Allergies   Allergen Reactions    Latex Rash    Penicillin G Rash       Home Medications    Prior to Admission medications    Medication Sig Start Date End Date Taking? Authorizing Provider   amLODIPine (NORVASC) 5 mg tablet Take 1 tablet (5 mg total) by mouth daily 10/3/19 3/31/20  MEMO Sheikh   aspirin (ECOTRIN LOW STRENGTH) 81 mg EC tablet Take 1 tablet (81 mg total) by mouth daily for 90 days 6/6/19 9/27/19  MEMO Sheikh   atorvastatin (LIPITOR) 10 mg tablet Take 1 tablet by mouth once daily 7/29/20   MEMO Sheikh   levocetirizine (XYZAL) 5 MG tablet TAKE 1 TABLET BY MOUTH ONCE DAILY IN THE EVENING 2/21/20   MEMO Sheikh   lisinopril (ZESTRIL) 10 mg tablet Take 1 tablet (10 mg total) by mouth daily 2/25/20 5/25/20  MEMO Sheikh   metFORMIN (GLUCOPHAGE) 500 mg tablet Take 1 tablet (500 mg total) by mouth 2 (two) times a day with meals 2/25/20 5/25/20  MEMO Sheikh   oxybutynin (DITROPAN) 5 mg tablet Take 1 tablet (5 mg total) by mouth 2 (two) times a day 4/7/20   Palma Griffith MD           Review of Systems    Review of Systems   Constitutional: Negative for activity change, chills and fever  HENT: Negative for drooling and facial swelling  Eyes: Negative for pain, discharge and visual disturbance  Respiratory: Negative for apnea, cough, chest tightness, shortness of breath and wheezing  Cardiovascular: Positive for leg swelling  Negative for chest pain  Gastrointestinal: Negative for abdominal pain, constipation, diarrhea, nausea and vomiting  Genitourinary: Positive for flank pain  Negative for difficulty urinating, dysuria and urgency  Musculoskeletal: Negative for arthralgias, back pain and gait problem  Skin: Negative for color change and rash  Neurological: Negative for dizziness, speech difficulty, weakness and headaches  Psychiatric/Behavioral: Negative for agitation, behavioral problems and confusion  All other systems reviewed and negative      Physical Exam      ED Triage Vitals [07/31/20 1635]   Temperature Pulse Respirations Blood Pressure SpO2   99 5 °F (37 5 °C) 91 17 (!) 191/79 97 %      Temp src Heart Rate Source Patient Position - Orthostatic VS BP Location FiO2 (%)   -- Monitor Sitting Left arm --      Pain Score       3               Physical Exam   Constitutional: She is oriented to person, place, and time  She appears well-developed and well-nourished  HENT:   Head: Normocephalic and atraumatic  Eyes: Pupils are equal, round, and reactive to light  Conjunctivae and EOM are normal    Neck: Normal range of motion  Neck supple  Cardiovascular: Normal rate, regular rhythm, normal heart sounds and intact distal pulses  Pulmonary/Chest: Effort normal and breath sounds normal  No respiratory distress  She has no wheezes  She has no rales  Abdominal: Soft  She exhibits no distension  There is no tenderness  There is no rebound and no guarding  Mild left-sided CVA tenderness   Musculoskeletal: Normal range of motion  She exhibits edema  She exhibits no deformity  1+ edema bilateral ankles, symmetric, no significant edema superior to ankles on lower extremities  Strength sensation cap refill and pulse intact in both feet  Neurological: She is alert and oriented to person, place, and time  Skin: Skin is warm and dry  No rash noted  No erythema  Psychiatric: She has a normal mood and affect  Her behavior is normal  Judgment and thought content normal    Nursing note and vitals reviewed             Diagnostic Results      Labs:    Results Reviewed     Procedure Component Value Units Date/Time    NT-BNP PRO [[id number]]  (Normal) Collected:  07/31/20 1653    Lab Status:  Final result Specimen:  Blood from Arm, Right Updated:  07/31/20 1720     NT-proBNP 69 pg/mL     Troponin I [[id number]]  (Normal) Collected:  07/31/20 1653    Lab Status:  Final result Specimen:  Blood from Arm, Right Updated:  07/31/20 1716     Troponin I <0 02 ng/mL     Comprehensive metabolic panel [[id number]] Collected:  07/31/20 1653    Lab Status:  Final result Specimen:  Blood from Arm, Right Updated:  07/31/20 1713 Sodium 140 mmol/L      Potassium 4 3 mmol/L      Chloride 104 mmol/L      CO2 26 mmol/L      ANION GAP 10 mmol/L      BUN 17 mg/dL      Creatinine 1 13 mg/dL      Glucose 133 mg/dL      Calcium 9 3 mg/dL      AST 14 U/L      ALT 19 U/L      Alkaline Phosphatase 55 U/L      Total Protein 7 4 g/dL      Albumin 3 8 g/dL      Total Bilirubin 0 30 mg/dL      eGFR 47 ml/min/1 73sq m     Narrative:       National Kidney Disease Foundation guidelines for Chronic Kidney Disease (CKD):     Stage 1 with normal or high GFR (GFR > 90 mL/min/1 73 square meters)    Stage 2 Mild CKD (GFR = 60-89 mL/min/1 73 square meters)    Stage 3A Moderate CKD (GFR = 45-59 mL/min/1 73 square meters)    Stage 3B Moderate CKD (GFR = 30-44 mL/min/1 73 square meters)    Stage 4 Severe CKD (GFR = 15-29 mL/min/1 73 square meters)    Stage 5 End Stage CKD (GFR <15 mL/min/1 73 square meters)  Note: GFR calculation is accurate only with a steady state creatinine    Urine Microscopic [[id number]]  (Abnormal) Collected:  07/31/20 1654    Lab Status:  Final result Specimen:  Urine, Clean Catch Updated:  07/31/20 1712     RBC, UA None Seen /hpf      WBC, UA 0-1 /hpf      Epithelial Cells Occasional /hpf      Bacteria, UA None Seen /hpf      OTHER OBSERVATIONS Transitional Epithelial Cells    UA w Reflex to Microscopic w Reflex to Culture [[id number]]  (Abnormal) Collected:  07/31/20 1654    Lab Status:  Final result Specimen:  Urine, Clean Catch Updated:  07/31/20 1700     Color, UA Light Yellow     Clarity, UA Clear     Specific Gravity, UA <=1 005     pH, UA 5 5     Leukocytes, UA Small     Nitrite, UA Negative     Protein, UA Negative mg/dl      Glucose, UA Negative mg/dl      Ketones, UA Negative mg/dl      Urobilinogen, UA 0 2 E U /dl      Bilirubin, UA Negative     Blood, UA Negative    CBC and differential [[id number]] Collected:  07/31/20 1653    Lab Status:  Final result Specimen:  Blood from Arm, Right Updated:  07/31/20 1659     WBC 6 31 Thousand/uL      RBC 4 26 Million/uL      Hemoglobin 13 3 g/dL      Hematocrit 41 7 %      MCV 98 fL      MCH 31 2 pg      MCHC 31 9 g/dL      RDW 13 8 %      MPV 10 8 fL      Platelets 264 Thousands/uL      nRBC 0 /100 WBCs      Neutrophils Relative 48 %      Immat GRANS % 0 %      Lymphocytes Relative 39 %      Monocytes Relative 10 %      Eosinophils Relative 2 %      Basophils Relative 1 %      Neutrophils Absolute 3 05 Thousands/µL      Immature Grans Absolute 0 02 Thousand/uL      Lymphocytes Absolute 2 46 Thousands/µL      Monocytes Absolute 0 63 Thousand/µL      Eosinophils Absolute 0 10 Thousand/µL      Basophils Absolute 0 05 Thousands/µL           All labs reviewed and utilized in the medical decision making process    Radiology:    XR chest 1 view portable   Final Result      No acute cardiopulmonary disease  Workstation performed: [id number]             All radiology studies independently viewed by me and interpreted by the radiologist     Procedure    Procedures        ED Course of Care and Re-Assessments      Started on Keflex in ER    Medications   cephalexin (KEFLEX) capsule 500 mg (500 mg Oral Given 7/31/20 1830)           FINAL IMPRESSION    Final diagnoses:   UTI (urinary tract infection)   Flank pain   Elevated blood pressure reading         DISPOSITION/PLAN    Presentation as above felt most consistent with urinary tract infection with pyelonephritis possible but felt to be less likely based on reassuring vital signs overall clinical picture  Started on Keflex to cover for infection based on history of similar symptoms with prior urinary tract infections as well as leukocytes noted in urine  Elevated blood pressure improved with observation  Very low suspicion for appendicitis, cholecystitis, bowel obstruction, other acute surgical emergency at this time based on benign and nontender abdomen    Edema of ankles appears mild, no other signs or symptoms of heart failure at this time, labs and chest x-ray reassuring  Felt more consistent with either heat edema or dependent edema  Hemodynamically stable and comfortable at time of discharge  Time reflects when diagnosis was documented in both MDM as applicable and the Disposition within this note     Time User Action Codes Description Comment    7/31/2020  6:02 PM Nidhi Hi Add [N39 0] UTI (urinary tract infection)     7/31/2020  6:02 PM Richard Litten T Add [R10 9] Flank pain     7/31/2020  6:03 PM Richard Litten T Add [R03 0] Elevated blood pressure reading       ED Disposition     ED Disposition Condition Date/Time Comment    Discharge Stable Fri Jul 31, 2020  6:02 PM Zeyad Calle discharge to home/self care              Follow-up Information     Follow up With Specialties Details Why Contact Info Additional [address] [facility] Emergency Department Emergency Medicine Go to  If symptoms worsen [address] [facility] [id number] [phone] [place] ED, [address], [place], [address] [facility] [place], [address] Nurse Practitioner Call in 2 days To discuss your symptoms and further care [address] [facility]  [address]  [address]               PAT[facility]:    [facility]  [address] [facility] [id number] [phone]  Go to   If symptoms worsen    Anthony Aguilar, [address]  [address] [facility] [address]  [phone]    Call in 2 days  To discuss your symptoms and further care      DISCHARGE MEDICATIONS:    Discharge Medication List as of 7/31/2020  6:04 PM      START taking these medications    Details   cephalexin (KEFLEX) 250 mg capsule Take 2 capsules (500 mg total) by mouth every 6 (six) hours for 7 days, Starting Fri 7/31/2020, Until Fri 8/7/2020, Print         CONTINUE these medications which have NOT CHANGED    Details   amLODIPine (NORVASC) 5 mg tablet Take 1 tablet (5 mg total) by mouth daily, Starting Thu 10/3/2019, Until Tue 3/31/2020, Normal      aspirin (ECOTRIN LOW STRENGTH) 81 mg EC tablet Take 1 tablet (81 mg total) by mouth daily for 90 days, Starting Thu 6/6/2019, Until Fri 9/27/2019, No Print      atorvastatin (LIPITOR) 10 mg tablet Take 1 tablet by mouth once daily, Normal      levocetirizine (XYZAL) 5 MG tablet TAKE 1 TABLET BY MOUTH ONCE DAILY IN THE EVENING, Normal      lisinopril (ZESTRIL) 10 mg tablet Take 1 tablet (10 mg total) by mouth daily, Starting Tue 2/25/2020, Until Mon 5/25/2020, Normal      metFORMIN (GLUCOPHAGE) 500 mg tablet Take 1 tablet (500 mg total) by mouth 2 (two) times a day with meals, Starting Tue 2/25/2020, Until Mon 5/25/2020, Normal      oxybutynin (DITROPAN) 5 mg tablet Take 1 tablet (5 mg total) by mouth 2 (two) times a day, Starting Tue 4/7/2020, Normal             No discharge procedures on file           MD Brittany Tyler MD  08/01/20 9901

## 2020-09-23 DIAGNOSIS — J30.2 SEASONAL ALLERGIES: ICD-10-CM

## 2020-09-23 RX ORDER — LEVOCETIRIZINE DIHYDROCHLORIDE 5 MG/1
TABLET, FILM COATED ORAL
Qty: 30 TABLET | Refills: 0 | Status: SHIPPED | OUTPATIENT
Start: 2020-09-23

## 2020-09-26 LAB — HBA1C MFR BLD HPLC: 7.2 %

## 2020-09-30 DIAGNOSIS — H10.13 ALLERGIC CONJUNCTIVITIS OF BOTH EYES: ICD-10-CM

## 2020-09-30 RX ORDER — OLOPATADINE HYDROCHLORIDE 1 MG/ML
SOLUTION/ DROPS OPHTHALMIC
Qty: 5 ML | Refills: 0 | Status: SHIPPED | OUTPATIENT
Start: 2020-09-30

## 2021-02-12 DIAGNOSIS — Z23 ENCOUNTER FOR IMMUNIZATION: ICD-10-CM

## 2021-02-27 DIAGNOSIS — N32.81 OAB (OVERACTIVE BLADDER): ICD-10-CM

## 2021-03-01 RX ORDER — OXYBUTYNIN CHLORIDE 5 MG/1
TABLET ORAL
Qty: 9060 TABLET | Refills: 0 | Status: SHIPPED | OUTPATIENT
Start: 2021-03-01 | End: 2021-03-02 | Stop reason: SDUPTHER

## 2021-03-02 DIAGNOSIS — N32.81 OAB (OVERACTIVE BLADDER): ICD-10-CM

## 2021-03-02 RX ORDER — OXYBUTYNIN CHLORIDE 5 MG/1
5 TABLET ORAL 2 TIMES DAILY
Qty: 60 TABLET | Refills: 0 | Status: SHIPPED | OUTPATIENT
Start: 2021-03-02

## 2021-03-24 DIAGNOSIS — N32.81 OAB (OVERACTIVE BLADDER): ICD-10-CM

## 2021-03-25 RX ORDER — OXYBUTYNIN CHLORIDE 5 MG/1
5 TABLET ORAL 2 TIMES DAILY
Qty: 180 TABLET | Refills: 1 | OUTPATIENT
Start: 2021-03-25

## 2023-09-13 ENCOUNTER — HOSPITAL ENCOUNTER (EMERGENCY)
Facility: HOSPITAL | Age: 78
Discharge: HOME | End: 2023-09-13
Attending: EMERGENCY MEDICINE
Payer: MEDICARE

## 2023-09-13 VITALS
HEART RATE: 75 BPM | SYSTOLIC BLOOD PRESSURE: 151 MMHG | TEMPERATURE: 98 F | OXYGEN SATURATION: 98 % | RESPIRATION RATE: 18 BRPM | DIASTOLIC BLOOD PRESSURE: 89 MMHG

## 2023-09-13 DIAGNOSIS — R30.0 DYSURIA: ICD-10-CM

## 2023-09-13 DIAGNOSIS — R35.0 URINARY FREQUENCY: ICD-10-CM

## 2023-09-13 DIAGNOSIS — N39.0 UTI (URINARY TRACT INFECTION): Primary | ICD-10-CM

## 2023-09-13 LAB
BACTERIA UR QL AUTO: ABNORMAL /HPF
BILIRUB UR QL STRIP: NEGATIVE
CLARITY UR: ABNORMAL
COLOR UR: YELLOW
GLUCOSE UR STRIP-MCNC: NEGATIVE MG/DL
HGB UR QL STRIP.AUTO: ABNORMAL
KETONES UR STRIP-MCNC: NEGATIVE MG/DL
LEUKOCYTE ESTERASE UR QL STRIP: ABNORMAL
NITRITE UR QL STRIP: NEGATIVE
NON-SQ EPI CELLS URNS QL MICRO: ABNORMAL /HPF
PH UR STRIP.AUTO: 5.5 [PH]
PROT UR STRIP-MCNC: ABNORMAL MG/DL
RBC #/AREA URNS AUTO: ABNORMAL /HPF
SP GR UR STRIP.AUTO: 1.01 (ref 1–1.03)
UROBILINOGEN UR STRIP-ACNC: <2 MG/DL
WBC #/AREA URNS AUTO: ABNORMAL /HPF
WBC CLUMPS # UR AUTO: PRESENT /UL

## 2023-09-13 PROCEDURE — 99283 EMERGENCY DEPT VISIT LOW MDM: CPT

## 2023-09-13 PROCEDURE — 87086 URINE CULTURE/COLONY COUNT: CPT | Performed by: EMERGENCY MEDICINE

## 2023-09-13 PROCEDURE — 87186 SC STD MICRODIL/AGAR DIL: CPT | Performed by: EMERGENCY MEDICINE

## 2023-09-13 PROCEDURE — 87077 CULTURE AEROBIC IDENTIFY: CPT | Performed by: EMERGENCY MEDICINE

## 2023-09-13 PROCEDURE — 81001 URINALYSIS AUTO W/SCOPE: CPT | Performed by: EMERGENCY MEDICINE

## 2023-09-13 PROCEDURE — 99284 EMERGENCY DEPT VISIT MOD MDM: CPT | Performed by: EMERGENCY MEDICINE

## 2023-09-13 RX ORDER — CEFPODOXIME PROXETIL 200 MG/1
200 TABLET, FILM COATED ORAL 2 TIMES DAILY
Qty: 14 TABLET | Refills: 0 | Status: SHIPPED | OUTPATIENT
Start: 2023-09-13 | End: 2023-09-20

## 2023-09-13 RX ORDER — CEFPODOXIME PROXETIL 200 MG/1
200 TABLET, FILM COATED ORAL ONCE
Status: COMPLETED | OUTPATIENT
Start: 2023-09-13 | End: 2023-09-13

## 2023-09-13 RX ADMIN — CEFPODOXIME PROXETIL 200 MG: 200 TABLET, FILM COATED ORAL at 21:34

## 2023-09-14 NOTE — ED PROVIDER NOTES
History  Chief Complaint   Patient presents with   • Difficulty Urinating     Patient reports feeling the urge to urinate but "only a little comes out" for approximately 1 week. Patient reports no fevers at home but states "I have had the chills". Reports pain with urination as well. 59-year-old female history of diabetes and hypertension on aspirin presenting with dysuria and frequency. Patient reports dysuria for about the past week as well as some frequency. Patient reports that she passes a small amount of urine and feels like she has to go again a short while later. History of similar with previous UTIs. Denies any abdominal pain. Denies any nausea vomiting diarrhea. Denies any fevers. Denies any chest pain shortness of breath. Denies any other complaints. Chart reviewed. Past Medical History:  No date: Diabetes mellitus ([address])      Comment:  Patient states "I quit my medication"  No date: Eczema  No date: Hypertension  No date: Malignant neoplasm of skin      Comment:  LAST ASSESSED: [id number]  No date: Nonmelanoma skin cancer      Comment:  LAST ASSESSED: [id number]  No date: Renal disorder      Comment:  stage 3  Family History: non-contributory  Social History            Prior to Admission Medications   Prescriptions Last Dose Informant Patient Reported? Taking?    amLODIPine (NORVASC) 5 mg tablet  Self No No   Sig: Take 1 tablet (5 mg total) by mouth daily   aspirin (ECOTRIN LOW STRENGTH) 81 mg EC tablet   No No   Sig: Take 1 tablet (81 mg total) by mouth daily for 90 days   atorvastatin (LIPITOR) 10 mg tablet   No No   Sig: Take 1 tablet by mouth once daily   levocetirizine (XYZAL) 5 MG tablet   No No   Sig: TAKE 1 TABLET BY MOUTH ONCE DAILY IN THE EVENING   lisinopril (ZESTRIL) 10 mg tablet   No No   Sig: Take 1 tablet (10 mg total) by mouth daily   metFORMIN (GLUCOPHAGE) 500 mg tablet   No No   Sig: Take 1 tablet (500 mg total) by mouth 2 (two) times a day with meals   olopatadine (PATANOL) 0.1 % ophthalmic solution   No No   Sig: INSTILL 1 DROP INTO EACH EYE TWICE DAILY FOR 5 DAYS   oxybutynin (DITROPAN) 5 mg tablet   No No   Sig: Take 1 tablet (5 mg total) by mouth 2 (two) times a day      Facility-Administered Medications: None       Past Medical History:   Diagnosis Date   • Diabetes mellitus ([address])     Patient states "I quit my medication"   • Eczema    • Hypertension    • Malignant neoplasm of skin     LAST ASSESSED: [id number]   • Nonmelanoma skin cancer     LAST ASSESSED: [id number]   • Renal disorder     stage 3       Past Surgical History:   Procedure Laterality Date   • EYE SURGERY      catarcts removal both   • SKIN BIOPSY      face and arm       Family History   Problem Relation Age of Onset   • Diabetes Mother    • Skin cancer Mother      I have reviewed and agree with the history as documented. E-Cigarette/Vaping   • E-Cigarette Use Never User      E-Cigarette/Vaping Substances     Social History     Tobacco Use   • Smoking status: Never   • Smokeless tobacco: Never   Vaping Use   • Vaping Use: Never used   Substance Use Topics   • Alcohol use: Not Currently     Alcohol/week: 0.0 standard drinks of alcohol   • Drug use: No       Review of Systems   Constitutional: Negative for appetite change, chills, diaphoresis, fever and unexpected weight change. HENT: Negative for congestion and rhinorrhea. Eyes: Negative for photophobia and visual disturbance. Respiratory: Negative for cough, chest tightness and shortness of breath. Cardiovascular: Negative for chest pain, palpitations and leg swelling. Gastrointestinal: Negative for abdominal distention, abdominal pain, blood in stool, constipation, diarrhea, nausea and vomiting. Genitourinary: Positive for dysuria and frequency. Negative for flank pain and hematuria. Musculoskeletal: Negative for back pain, joint swelling, neck pain and neck stiffness. Skin: Negative for color change, pallor, rash and wound. Neurological: Negative for dizziness, syncope, weakness, light-headedness and headaches. Psychiatric/Behavioral: Negative for agitation. All other systems reviewed and are negative. Physical Exam  Physical Exam  Vitals and nursing note reviewed. Constitutional:       General: She is not in acute distress. Appearance: Normal appearance. She is well-developed. She is not ill-appearing, toxic-appearing or diaphoretic. HENT:      Head: Normocephalic and atraumatic. Nose: Nose normal. No congestion or rhinorrhea. Mouth/Throat:      Mouth: Mucous membranes are moist.      Pharynx: Oropharynx is clear. No oropharyngeal exudate or posterior oropharyngeal erythema. Eyes:      General: No scleral icterus. Right eye: No discharge. Left eye: No discharge. Extraocular Movements: Extraocular movements intact. Conjunctiva/sclera: Conjunctivae normal.      Pupils: Pupils are equal, round, and reactive to light. Neck:      Vascular: No JVD. Trachea: No tracheal deviation. Comments: Supple. Normal range of motion. Cardiovascular:      Rate and Rhythm: Normal rate and regular rhythm. Heart sounds: Normal heart sounds. No murmur heard. No friction rub. No gallop. Comments: Normal rate and regular rhythm  Pulmonary:      Effort: Pulmonary effort is normal. No respiratory distress. Breath sounds: Normal breath sounds. No stridor. No wheezing or rales. Comments: Clear to auscultation bilaterally  Chest:      Chest wall: No tenderness. Abdominal:      General: Bowel sounds are normal. There is no distension. Palpations: Abdomen is soft. Tenderness: There is no abdominal tenderness. There is no right CVA tenderness, left CVA tenderness, guarding or rebound. Comments: Soft, nontender, nondistended. Normal bowel sounds throughout   Musculoskeletal:         General: No swelling, tenderness, deformity or signs of injury.  Normal range of motion. Cervical back: Normal range of motion and neck supple. No rigidity. No muscular tenderness. Right lower leg: No edema. Left lower leg: No edema. Lymphadenopathy:      Cervical: No cervical adenopathy. Skin:     General: Skin is warm and dry. Coloration: Skin is not pale. Findings: No erythema or rash. Neurological:      General: No focal deficit present. Mental Status: She is alert. Mental status is at baseline. Sensory: No sensory deficit. Motor: No weakness or abnormal muscle tone. Coordination: Coordination normal.      Gait: Gait normal.      Comments: Alert. Strength and sensation grossly intact. Ambulatory without difficulty at baseline. Psychiatric:         Behavior: Behavior normal.         Thought Content:  Thought content normal.         Vital Signs  ED Triage Vitals   Temperature Pulse Respirations Blood Pressure SpO2   09/13/23 1940 09/13/23 1940 09/13/23 1940 09/13/23 1940 09/13/23 1940   97.5 °F (36.4 °C) 105 18 (!) 174/72 98 %      Temp Source Heart Rate Source Patient Position - Orthostatic VS BP Location FiO2 (%)   09/13/23 1940 09/13/23 1940 09/13/23 1940 09/13/23 1940 --   Temporal Monitor Sitting Left arm       Pain Score       09/13/23 2139       No Pain           Vitals:    09/13/23 1940 09/13/23 2035 09/13/23 2139   BP: (!) 174/72  151/89   Pulse: 105 78 75   Patient Position - Orthostatic VS: Sitting  Sitting         Visual Acuity      ED Medications  Medications   cefpodoxime (VANTIN) tablet 200 mg (200 mg Oral Given 9/13/23 2134)       Diagnostic Studies  Results Reviewed     Procedure Component Value Units Date/Time    Urine Microscopic [[id number]]  (Abnormal) Collected: 09/13/23 2026    Lab Status: Final result Specimen: Urine, Clean Catch Updated: 09/13/23 2045     RBC, UA 4-10 /hpf      WBC, UA Innumerable /hpf      Epithelial Cells Occasional /hpf      Bacteria, UA Innumerable /hpf      WBC Clumps Present    Urine culture [[id number]] Collected: 09/13/23 2026    Lab Status: In process Specimen: Urine, Clean Catch Updated: 09/13/23 2045    UA w Reflex to Microscopic w Reflex to Culture [[id number]]  (Abnormal) Collected: 09/13/23 2026    Lab Status: Final result Specimen: Urine, Clean Catch Updated: 09/13/23 2041     Color, UA Yellow     Clarity, UA Extra Turbid     Specific Gravity, UA 1.007     pH, UA 5.5     Leukocytes, UA Large     Nitrite, UA Negative     Protein, UA Trace mg/dl      Glucose, UA Negative mg/dl      Ketones, UA Negative mg/dl      Urobilinogen, UA <2.0 mg/dl      Bilirubin, UA Negative     Occult Blood, UA Moderate                 No orders to display              Procedures  Procedures         ED Course                               SBIRT 22yo+    Flowsheet Row Most Recent Value   Initial Alcohol Screen: US AUDIT-C     1. How often do you have a drink containing alcohol? 1 Filed at: 09/13/2023 2137   2. How many drinks containing alcohol do you have on a typical day you are drinking? 1 Filed at: 09/13/2023 2137   3b. FEMALE Any Age, or MALE 65+: How often do you have 4 or more drinks on one occassion? 0 Filed at: 09/13/2023 2137   Audit-C Score 2 Filed at: 09/13/2023 2137   OMA: How many times in the past year have you. .. Used an illegal drug or used a prescription medication for non-medical reasons? Never Filed at: 09/13/2023 2137                    Medical Decision Making  17-year-old female history of diabetes and hypertension on aspirin presenting with dysuria and frequency. Well-appearing female no fevers presenting with isolated urinary symptoms. Possible UTI. Plan for UA and postvoid residual.  Reassess. Normal postvoid residual.  UA concerning for UTI. Patient very well-appearing. Normal vital signs. Given dose of antibiotics and prescription sent to pharmacy. Patient and family at bedside amenable for discharge at this time. Discussed results and recommendations.  Advised follow up PCP. Medication recommendations. Given instructions and return precautions. Patient/family at bedside acknowledged understanding of all written and verbal instructions and return precautions. Discharged. Amount and/or Complexity of Data Reviewed  Labs: ordered. Risk  Prescription drug management. Disposition  Final diagnoses:   Dysuria   Urinary frequency   UTI (urinary tract infection)     Time reflects when diagnosis was documented in both MDM as applicable and the Disposition within this note     Time User Action Codes Description Comment    9/13/2023  8:41 PM [place] Car[place] Add [R30.0] Dysuria     9/13/2023  8:41 PM [place] Car[place] Add [R35.0] Urinary frequency     9/13/2023  8:50 PM Marcial Carwin Add [N39.0] UTI (urinary tract infection)     9/13/2023  8:50 PM [place] Carwin Modify [R30.0] Dysuria     9/13/2023  8:50 PM [place] Car[place] Modify [N39.0] UTI (urinary tract infection)       ED Disposition     ED Disposition   Discharge    Condition   Stable    Date/Time   Wed Sep 13, 2023  8:50 PM    Comment   Danelle Del Castillo discharge to home/self care.                Follow-up Information     Follow up With Specialties Details Why Latia Valente MD Family Medicine Schedule an appointment as soon as possible for a visit in 1 week  [address] [place] [address]  [phone]            Discharge Medication List as of 9/13/2023  8:50 PM      START taking these medications    Details   cefpodoxime (VANTIN) 200 mg tablet Take 1 tablet (200 mg total) by mouth 2 (two) times a day for 7 days, Starting Wed 9/13/2023, Until Wed 9/20/2023, Normal         CONTINUE these medications which have NOT CHANGED    Details   amLODIPine (NORVASC) 5 mg tablet Take 1 tablet (5 mg total) by mouth daily, Starting Thu 10/3/2019, Until Tue 3/31/2020, Normal      aspirin (ECOTRIN LOW STRENGTH) 81 mg EC tablet Take 1 tablet (81 mg total) by mouth daily for 90 days, Starting Thu 6/6/2019, Until Fri 9/27/2019, No Print      atorvastatin (LIPITOR) 10 mg tablet Take 1 tablet by mouth once daily, Normal      levocetirizine (XYZAL) 5 MG tablet TAKE 1 TABLET BY MOUTH ONCE DAILY IN THE EVENING, Normal      lisinopril (ZESTRIL) 10 mg tablet Take 1 tablet (10 mg total) by mouth daily, Starting Tue 2/25/2020, Until Mon 5/25/2020, Normal      metFORMIN (GLUCOPHAGE) 500 mg tablet Take 1 tablet (500 mg total) by mouth 2 (two) times a day with meals, Starting Tue 2/25/2020, Until Mon 5/25/2020, Normal      olopatadine (PATANOL) 0.1 % ophthalmic solution INSTILL 1 DROP INTO EACH EYE TWICE DAILY FOR 5 DAYS, Normal      oxybutynin (DITROPAN) 5 mg tablet Take 1 tablet (5 mg total) by mouth 2 (two) times a day, Starting Tue 3/2/2021, Normal             No discharge procedures on file.     PDMP Review     None          ED Provider  Electronically Signed by           Gino Forrest MD  09/13/23 8848

## 2023-09-16 LAB — BACTERIA UR CULT: ABNORMAL

## 2023-12-09 ENCOUNTER — HOSPITAL ENCOUNTER (EMERGENCY)
Facility: HOSPITAL | Age: 78
Discharge: HOME | End: 2023-12-09
Attending: STUDENT IN AN ORGANIZED HEALTH CARE EDUCATION/TRAINING PROGRAM
Payer: MEDICARE

## 2023-12-09 VITALS
TEMPERATURE: 97.7 F | RESPIRATION RATE: 16 BRPM | DIASTOLIC BLOOD PRESSURE: 63 MMHG | HEART RATE: 80 BPM | SYSTOLIC BLOOD PRESSURE: 139 MMHG | OXYGEN SATURATION: 100 %

## 2023-12-09 DIAGNOSIS — M26.629 TMJ SYNDROME: Primary | ICD-10-CM

## 2023-12-09 PROCEDURE — 99283 EMERGENCY DEPT VISIT LOW MDM: CPT

## 2023-12-09 PROCEDURE — 99284 EMERGENCY DEPT VISIT MOD MDM: CPT | Performed by: STUDENT IN AN ORGANIZED HEALTH CARE EDUCATION/TRAINING PROGRAM

## 2023-12-09 RX ORDER — CYCLOBENZAPRINE HCL 10 MG
5 TABLET ORAL 2 TIMES DAILY PRN
Qty: 20 TABLET | Refills: 0 | Status: SHIPPED | OUTPATIENT
Start: 2023-12-09

## 2023-12-09 NOTE — DISCHARGE INSTRUCTIONS
You can use over-the-counter medications like Tylenol for discomfort.  He may prescribe a muscle relaxer to help with discomfort, do not drive after using this medication.  You can try a warm compress several times a day for 15 to 20 minutes at a time.  You can apply gentle massage to the area discomfort.    Follow-up with your primary care physician for reevaluation.    Return if you have new or worse symptoms such as development of chest pain shortness of breath or difficulty breathing.  Also return if you develop any headache, vision changes.

## 2024-01-01 NOTE — ED PROVIDER NOTES
"History  Chief Complaint   Patient presents with    Jaw Pain     Pt complains of jaw pain that started two weeks ago. Pt states that her jaw pops and clicks when opening and closing the mouth.      HPI    Patient is an 78-year-old female presenting to the emergency department with localized jaw discomfort. Patient also notes clicking with opening and closing of mouth. Patient says it hurts more when she eats.  Patient's discomfort is point tenderness without radiation.  Denies any fever chills URI symptoms.  Denies any chest pain, shortness of breath or difficulty breathing.  History includes diabetes, hypertension.  No other pertinent family history.  Patient is never smoker.    Prior to Admission Medications   Prescriptions Last Dose Informant Patient Reported? Taking?   amLODIPine (NORVASC) 5 mg tablet  Self No No   Sig: Take 1 tablet (5 mg total) by mouth daily   aspirin (ECOTRIN LOW STRENGTH) 81 mg EC tablet   No No   Sig: Take 1 tablet (81 mg total) by mouth daily for 90 days   atorvastatin (LIPITOR) 10 mg tablet   No No   Sig: Take 1 tablet by mouth once daily   levocetirizine (XYZAL) 5 MG tablet   No No   Sig: TAKE 1 TABLET BY MOUTH ONCE DAILY IN THE EVENING   lisinopril (ZESTRIL) 10 mg tablet   No No   Sig: Take 1 tablet (10 mg total) by mouth daily   metFORMIN (GLUCOPHAGE) 500 mg tablet   No No   Sig: Take 1 tablet (500 mg total) by mouth 2 (two) times a day with meals   olopatadine (PATANOL) 0.1 % ophthalmic solution   No No   Sig: INSTILL 1 DROP INTO EACH EYE TWICE DAILY FOR 5 DAYS   oxybutynin (DITROPAN) 5 mg tablet   No No   Sig: Take 1 tablet (5 mg total) by mouth 2 (two) times a day      Facility-Administered Medications: None       Past Medical History:   Diagnosis Date    Diabetes mellitus ([facility])     Patient states \"I quit my medication\"    Eczema     Hypertension     Malignant neoplasm of skin     LAST ASSESSED: [id number]    Nonmelanoma skin cancer     LAST ASSESSED: [id number]    Renal disorder     " stage 3       Past Surgical History:   Procedure Laterality Date    EYE SURGERY      catarcts removal both    SKIN BIOPSY      face and arm       Family History   Problem Relation Age of Onset    Diabetes Mother     Skin cancer Mother      I have reviewed and agree with the history as documented.    E-Cigarette/Vaping    E-Cigarette Use Never User      E-Cigarette/Vaping Substances     Social History     Tobacco Use    Smoking status: Never    Smokeless tobacco: Never   Vaping Use    Vaping status: Never Used   Substance Use Topics    Alcohol use: Not Currently     Alcohol/week: 0.0 standard drinks of alcohol    Drug use: No       Review of Systems   Constitutional:  Negative for chills and fever.   HENT:  Negative for ear pain and sore throat.         Jaw pain   Eyes:  Negative for pain and visual disturbance.   Respiratory:  Negative for cough and shortness of breath.    Cardiovascular:  Negative for chest pain and palpitations.   Gastrointestinal:  Negative for abdominal pain and vomiting.   Genitourinary:  Negative for dysuria and hematuria.   Musculoskeletal:  Negative for arthralgias and back pain.   Skin:  Negative for color change and rash.   Neurological:  Negative for seizures and syncope.   All other systems reviewed and are negative.      Physical Exam  Physical Exam  Vitals and nursing note reviewed.   Constitutional:       General: She is not in acute distress.     Appearance: She is well-developed.   HENT:      Head: Normocephalic and atraumatic.      Right Ear: Tympanic membrane and ear canal normal.      Left Ear: Tympanic membrane and ear canal normal.      Nose: Nose normal.      Mouth/Throat:      Mouth: Mucous membranes are moist.      Pharynx: Oropharynx is clear.   Eyes:      Conjunctiva/sclera: Conjunctivae normal.   Cardiovascular:      Rate and Rhythm: Normal rate and regular rhythm.      Heart sounds: No murmur heard.  Pulmonary:      Effort: Pulmonary effort is normal. No respiratory  distress.      Breath sounds: Normal breath sounds.   Abdominal:      Palpations: Abdomen is soft.      Tenderness: There is no abdominal tenderness.   Musculoskeletal:         General: No swelling.      Cervical back: Neck supple.      Comments: Right sided jaw pain with point tenderness on the tmj. Clicking noted. No signs of infection,    Skin:     General: Skin is warm and dry.      Capillary Refill: Capillary refill takes less than 2 seconds.   Neurological:      General: No focal deficit present.      Mental Status: She is alert and oriented to person, place, and time.   Psychiatric:         Mood and Affect: Mood normal.         Vital Signs  ED Triage Vitals [12/09/23 1104]   Temperature Pulse Respirations Blood Pressure SpO2   97.7 °F (36.5 °C) 80 16 139/63 100 %      Temp Source Heart Rate Source Patient Position - Orthostatic VS BP Location FiO2 (%)   Oral Monitor -- Left arm --      Pain Score       --           Vitals:    12/09/23 1104   BP: 139/63   Pulse: 80         Visual Acuity      ED Medications  Medications - No data to display    Diagnostic Studies  Results Reviewed       None                   No orders to display              Procedures  Procedures         ED Course               Identification of Seniors at Risk      Flowsheet Row Most Recent Value   (ISAR) Identification of Seniors at Risk    Before the illness or injury that brought you to the Emergency, did you need someone to help you on a regular basis? 0 Filed at: 12/09/2023 1104   In the last 24 hours, have you needed more help than usual? 0 Filed at: 12/09/2023 1104   Have you been hospitalized for one or more nights during the past 6 months? 0 Filed at: 12/09/2023 1104   In general, do you see well? 0 Filed at: 12/09/2023 1104   In general, do you have serious problems with your memory? 0 Filed at: 12/09/2023 1104   Do you take more than three different medications every day? 0 Filed at: 12/09/2023 1104   ISAR Score 0 Filed at:  12/09/2023 1104                        SBIRT 20yo+      Flowsheet Row Most Recent Value   Initial Alcohol Screen: US AUDIT-C     1. How often do you have a drink containing alcohol? 0 Filed at: 12/09/2023 1104   2. How many drinks containing alcohol do you have on a typical day you are drinking?  0 Filed at: 12/09/2023 1104   3b. FEMALE Any Age, or MALE 65+: How often do you have 4 or more drinks on one occassion? 0 Filed at: 12/09/2023 1104   Audit-C Score 0 Filed at: 12/09/2023 1104   OMA: How many times in the past year have you...    Used an illegal drug or used a prescription medication for non-medical reasons? Never Filed at: 12/09/2023 1104                      Medical Decision Making  Differential TMJ syndrome, muscle strain, trigeminal neuralgia    Patient is a well-appearing 78-year-old female presenting no acute respiratory distress and vital signs overall unremarkable.  Patient has full range of motion of her neck.  Soft and supple without lymphadenopathy.  Dentition intact without any mouth sores or swelling.    Patient has point tenderness on her right TMJ with clicking noted with the mouth opening and closing.  Patient is without any chest pain, shortness of breath or diaphoresis.  Discomfort remains localized into the jaw that appears mechanical.    Discussed symptomatic management as well as return follow-up precautions.  Patient verbalized understanding.  Disposition discharge    Risk  Prescription drug management.             Disposition  Final diagnoses:   TMJ syndrome     Time reflects when diagnosis was documented in both MDM as applicable and the Disposition within this note       Time User Action Codes Description Comment    12/9/2023 12:26 PM Rohini Purcell Add [M26.629] TMJ syndrome           ED Disposition       ED Disposition   Discharge    Condition   Stable    Date/Time   Sat Dec 9, 2023 1226    Comment   Deyanira Perry discharge to home/self care.                   Follow-up  Information       Follow up With Specialties Details Why Contact Info    Mansi Beyer MD Family Medicine Schedule an appointment as soon as possible for a visit  For reevaluation [address] [facility], [address]  [place] [address] [phone]              Discharge Medication List as of 12/9/2023 12:29 PM        START taking these medications    Details   cyclobenzaprine (FLEXERIL) 10 mg tablet Take 0.5 tablets (5 mg total) by mouth 2 (two) times a day as needed for muscle spasms, Starting Sat 12/9/2023, Normal           CONTINUE these medications which have NOT CHANGED    Details   amLODIPine (NORVASC) 5 mg tablet Take 1 tablet (5 mg total) by mouth daily, Starting Thu 10/3/2019, Until Tue 3/31/2020, Normal      aspirin (ECOTRIN LOW STRENGTH) 81 mg EC tablet Take 1 tablet (81 mg total) by mouth daily for 90 days, Starting Thu 6/6/2019, Until Fri 9/27/2019, No Print      atorvastatin (LIPITOR) 10 mg tablet Take 1 tablet by mouth once daily, Normal      levocetirizine (XYZAL) 5 MG tablet TAKE 1 TABLET BY MOUTH ONCE DAILY IN THE EVENING, Normal      lisinopril (ZESTRIL) 10 mg tablet Take 1 tablet (10 mg total) by mouth daily, Starting Tue 2/25/2020, Until Mon 5/25/2020, Normal      metFORMIN (GLUCOPHAGE) 500 mg tablet Take 1 tablet (500 mg total) by mouth 2 (two) times a day with meals, Starting Tue 2/25/2020, Until Mon 5/25/2020, Normal      olopatadine (PATANOL) 0.1 % ophthalmic solution INSTILL 1 DROP INTO EACH EYE TWICE DAILY FOR 5 DAYS, Normal      oxybutynin (DITROPAN) 5 mg tablet Take 1 tablet (5 mg total) by mouth 2 (two) times a day, Starting Tue 3/2/2021, Normal             No discharge procedures on file.    PDMP Review       None            ED Provider  Electronically Signed by             Rohini Purcell DO  01/01/24 1996

## 2024-02-21 PROBLEM — Z00.00 MEDICARE ANNUAL WELLNESS VISIT, INITIAL: Status: RESOLVED | Noted: 2019-10-03 | Resolved: 2024-02-21

## 2024-02-21 PROBLEM — H10.13 ALLERGIC CONJUNCTIVITIS OF BOTH EYES: Status: RESOLVED | Noted: 2018-11-20 | Resolved: 2024-02-21

## 2024-02-21 PROBLEM — R50.9 FEVER: Status: RESOLVED | Noted: 2019-09-25 | Resolved: 2024-02-21

## 2024-02-21 PROBLEM — N39.0 UTI (URINARY TRACT INFECTION): Status: RESOLVED | Noted: 2018-05-02 | Resolved: 2024-02-21

## 2024-10-09 ENCOUNTER — APPOINTMENT (EMERGENCY)
Dept: RADIOLOGY | Facility: HOSPITAL | Age: 79
End: 2024-10-09
Payer: MEDICARE

## 2024-10-09 ENCOUNTER — HOSPITAL ENCOUNTER (EMERGENCY)
Facility: HOSPITAL | Age: 79
Discharge: HOME | End: 2024-10-09
Attending: EMERGENCY MEDICINE
Payer: MEDICARE

## 2024-10-09 VITALS
SYSTOLIC BLOOD PRESSURE: 123 MMHG | OXYGEN SATURATION: 95 % | BODY MASS INDEX: 23.98 KG/M2 | RESPIRATION RATE: 17 BRPM | WEIGHT: 127 LBS | HEIGHT: 61 IN | DIASTOLIC BLOOD PRESSURE: 75 MMHG | HEART RATE: 70 BPM | TEMPERATURE: 97.1 F

## 2024-10-09 DIAGNOSIS — M62.838 NECK MUSCLE SPASM: Primary | ICD-10-CM

## 2024-10-09 DIAGNOSIS — M54.12 CERVICAL RADICULOPATHY: ICD-10-CM

## 2024-10-09 LAB
ALBUMIN SERPL BCG-MCNC: 3.9 G/DL (ref 3.5–5)
ALP SERPL-CCNC: 181 U/L (ref 34–104)
ALT SERPL W P-5'-P-CCNC: 38 U/L (ref 7–52)
ANION GAP SERPL CALCULATED.3IONS-SCNC: 8 MMOL/L (ref 4–13)
AST SERPL W P-5'-P-CCNC: 19 U/L (ref 13–39)
BASOPHILS # BLD AUTO: 0.04 THOUSANDS/ΜL (ref 0–0.1)
BASOPHILS NFR BLD AUTO: 1 % (ref 0–1)
BILIRUB SERPL-MCNC: 0.27 MG/DL (ref 0.2–1)
BUN SERPL-MCNC: 32 MG/DL (ref 5–25)
CALCIUM SERPL-MCNC: 10 MG/DL (ref 8.4–10.2)
CARDIAC TROPONIN I PNL SERPL HS: 5 NG/L (ref 8–18)
CHLORIDE SERPL-SCNC: 102 MMOL/L (ref 96–108)
CO2 SERPL-SCNC: 27 MMOL/L (ref 21–32)
CREAT SERPL-MCNC: 1.31 MG/DL (ref 0.6–1.3)
EOSINOPHIL # BLD AUTO: 0.13 THOUSAND/ΜL (ref 0–0.61)
EOSINOPHIL NFR BLD AUTO: 3 % (ref 0–6)
ERYTHROCYTE [DISTWIDTH] IN BLOOD BY AUTOMATED COUNT: 13.3 % (ref 11.6–15.1)
GFR SERPL CREATININE-BSD FRML MDRD: 38 ML/MIN/1.73SQ M
GLUCOSE SERPL-MCNC: 164 MG/DL (ref 65–140)
HCT VFR BLD AUTO: 43.2 % (ref 34.8–46.1)
HGB BLD-MCNC: 13.5 G/DL (ref 11.5–15.4)
IMM GRANULOCYTES # BLD AUTO: 0.01 THOUSAND/UL (ref 0–0.2)
IMM GRANULOCYTES NFR BLD AUTO: 0 % (ref 0–2)
LYMPHOCYTES # BLD AUTO: 1.43 THOUSANDS/ΜL (ref 0.6–4.47)
LYMPHOCYTES NFR BLD AUTO: 28 % (ref 14–44)
MCH RBC QN AUTO: 30.8 PG (ref 26.8–34.3)
MCHC RBC AUTO-ENTMCNC: 31.3 G/DL (ref 31.4–37.4)
MCV RBC AUTO: 99 FL (ref 82–98)
MONOCYTES # BLD AUTO: 0.48 THOUSAND/ΜL (ref 0.17–1.22)
MONOCYTES NFR BLD AUTO: 10 % (ref 4–12)
NEUTROPHILS # BLD AUTO: 2.95 THOUSANDS/ΜL (ref 1.85–7.62)
NEUTS SEG NFR BLD AUTO: 58 % (ref 43–75)
NRBC BLD AUTO-RTO: 0 /100 WBCS
PLATELET # BLD AUTO: 259 THOUSANDS/UL (ref 149–390)
PMV BLD AUTO: 10.6 FL (ref 8.9–12.7)
POTASSIUM SERPL-SCNC: 4.7 MMOL/L (ref 3.5–5.3)
PROT SERPL-MCNC: 7.6 G/DL (ref 6.4–8.4)
RBC # BLD AUTO: 4.38 MILLION/UL (ref 3.81–5.12)
SODIUM SERPL-SCNC: 137 MMOL/L (ref 135–147)
WBC # BLD AUTO: 5.04 THOUSAND/UL (ref 4.31–10.16)

## 2024-10-09 PROCEDURE — 84484 ASSAY OF TROPONIN QUANT: CPT | Performed by: EMERGENCY MEDICINE

## 2024-10-09 PROCEDURE — 71045 X-RAY EXAM CHEST 1 VIEW: CPT

## 2024-10-09 PROCEDURE — 36415 COLL VENOUS BLD VENIPUNCTURE: CPT | Performed by: EMERGENCY MEDICINE

## 2024-10-09 PROCEDURE — 85025 COMPLETE CBC W/AUTO DIFF WBC: CPT | Performed by: EMERGENCY MEDICINE

## 2024-10-09 PROCEDURE — 99283 EMERGENCY DEPT VISIT LOW MDM: CPT

## 2024-10-09 PROCEDURE — 93005 ELECTROCARDIOGRAM TRACING: CPT

## 2024-10-09 PROCEDURE — 99284 EMERGENCY DEPT VISIT MOD MDM: CPT | Performed by: EMERGENCY MEDICINE

## 2024-10-09 PROCEDURE — 80053 COMPREHEN METABOLIC PANEL: CPT | Performed by: EMERGENCY MEDICINE

## 2024-10-09 RX ORDER — LIDOCAINE 50 MG/G
1 PATCH TOPICAL DAILY PRN
Qty: 30 PATCH | Refills: 0 | Status: SHIPPED | OUTPATIENT
Start: 2024-10-09

## 2024-10-09 RX ORDER — LIDOCAINE 50 MG/G
1 PATCH TOPICAL ONCE
Status: DISCONTINUED | OUTPATIENT
Start: 2024-10-09 | End: 2024-10-09 | Stop reason: HOSPADM

## 2024-10-09 RX ORDER — ACETAMINOPHEN 325 MG/1
650 TABLET ORAL ONCE
Status: COMPLETED | OUTPATIENT
Start: 2024-10-09 | End: 2024-10-09

## 2024-10-09 RX ORDER — TIZANIDINE 2 MG/1
4 TABLET ORAL ONCE
Status: COMPLETED | OUTPATIENT
Start: 2024-10-09 | End: 2024-10-09

## 2024-10-09 RX ADMIN — ACETAMINOPHEN 650 MG: 325 TABLET ORAL at 18:17

## 2024-10-09 RX ADMIN — TIZANIDINE 4 MG: 2 TABLET ORAL at 18:17

## 2024-10-09 RX ADMIN — LIDOCAINE 1 PATCH: 50 PATCH CUTANEOUS at 18:17

## 2024-10-09 NOTE — ED PROVIDER NOTES
Final diagnoses:   Neck muscle spasm   Cervical radiculopathy     ED Disposition       ED Disposition   Discharge    Condition   Stable    Date/Time   Wed Oct 9, 2024  7:34 PM    Comment   Deyanira Perry discharge to home/self care.                   Assessment & Plan       Medical Decision Making  Patient is an 79-year-old female who presents to the emergency department with right-sided neck pain for 1 week.  She states she woke up from sleep last Thursday with a stiff neck.  She has continued to have persistent pain, radiating down into the right arm to her elbow.  She denies weakness, or sensory deficits extending beyond the elbow.  She has intermittently attempted Tylenol with limited relief.  Her daughter states that she became concerned because she had an episode of diaphoresis and nausea associated with the pain on Saturday, 4 days ago, and due to the persistence of the pain brought the patient to the emergency department to be evaluated.  Low suspicion for ACS, vascular pathology based on the duration of complaints and generally reassuring exam.  Patient has reproducible tenderness overlying the trapezius and palpable spasm most consistent with muscle spasm and radiculopathy.  Will treat symptomatically while obtaining cardiac screening labs.  Discussed potential trigger point injection pending improvement with meds.  Patient will consider.    Amount and/or Complexity of Data Reviewed  Labs: ordered. Decision-making details documented in ED Course.  Radiology: ordered and independent interpretation performed.     Details: No consolidation or infiltrate  ECG/medicine tests: independent interpretation performed.     Details: Normal sinus rhythm at 69 bpm, normal axis, right bundle branch block, abnormal EKG, unchanged from prior    Risk  OTC drugs.  Prescription drug management.        ED Course as of 10/09/24 2320   Wed Oct 09, 2024   1858 High Sensitivity Troponin I Random(!)   1858 Comprehensive metabolic  "panel(!)   1934 Symptomatically improved. Reassured by workup. Stablefor d/c       Medications   acetaminophen (TYLENOL) tablet 650 mg (650 mg Oral Given 10/9/24 1817)   tiZANidine (ZANAFLEX) tablet 4 mg (4 mg Oral Given 10/9/24 1817)       ED Risk Strat Scores                           SBIRT 22yo+      Flowsheet Row Most Recent Value   Initial Alcohol Screen: US AUDIT-C     1. How often do you have a drink containing alcohol? 0 Filed at: 10/09/2024 1958   2. How many drinks containing alcohol do you have on a typical day you are drinking?  0 Filed at: 10/09/2024 1958   3b. FEMALE Any Age, or MALE 65+: How often do you have 4 or more drinks on one occassion? 0 Filed at: 10/09/2024 1958   Audit-C Score 0 Filed at: 10/09/2024 1958   OMA: How many times in the past year have you...    Used an illegal drug or used a prescription medication for non-medical reasons? Never Filed at: 10/09/2024 1958                            History of Present Illness       Chief Complaint   Patient presents with    Neck Pain     C/o r sided neck pain and r arm pain, x 1 week         Past Medical History:   Diagnosis Date    Diabetes mellitus (HCC)     Patient states \"I quit my medication\"    Eczema     Hypertension     Malignant neoplasm of skin     LAST ASSESSED: [id number]    Nonmelanoma skin cancer     LAST ASSESSED: [id number]    Renal disorder     stage 3      Past Surgical History:   Procedure Laterality Date    EYE SURGERY      catarcts removal both    SKIN BIOPSY      face and arm      Family History   Problem Relation Age of Onset    Diabetes Mother     Skin cancer Mother       Social History     Tobacco Use    Smoking status: Never    Smokeless tobacco: Never   Vaping Use    Vaping status: Never Used   Substance Use Topics    Alcohol use: Not Currently     Alcohol/week: 0.0 standard drinks of alcohol    Drug use: No      E-Cigarette/Vaping    E-Cigarette Use Never User       E-Cigarette/Vaping Substances      I have reviewed and " agree with the history as documented.       Neck Pain  Pain location:  Occipital region and R side  Quality:  Aching  Pain radiates to:  R arm and R scapula  Pain severity:  Moderate  Pain is:  Same all the time  Timing:  Constant  Progression:  Waxing and waning  Context: not fall, not jumping from heights, not lifting a heavy object, not MCA, not MVC, not pedestrian accident and not recent injury    Relieved by:  Nothing  Associated symptoms: no bladder incontinence, no bowel incontinence, no chest pain, no numbness, no paresis, no tingling and no weakness        Review of Systems   Cardiovascular:  Negative for chest pain.   Gastrointestinal:  Negative for bowel incontinence.   Genitourinary:  Negative for bladder incontinence.   Musculoskeletal:  Positive for neck pain.   Neurological:  Negative for tingling, weakness and numbness.           Objective       ED Triage Vitals   Temperature Pulse Blood Pressure Respirations SpO2 Patient Position - Orthostatic VS   10/09/24 1714 10/09/24 1714 10/09/24 1714 10/09/24 1714 10/09/24 1714 10/09/24 1714   (!) 97.1 °F (36.2 °C) 95 (!) 188/80 18 98 % Sitting      Temp src Heart Rate Source BP Location FiO2 (%) Pain Score    -- 10/09/24 1714 10/09/24 1714 -- 10/09/24 1817     Monitor Left arm  7      Vitals      Date and Time Temp Pulse SpO2 Resp BP Pain Score FACES Pain Rating User   10/09/24 1900 -- 70 95 % 17 123/75 -- -- NW   10/09/24 1817 -- -- -- -- -- 7 -- NW   10/09/24 1714 97.1 °F (36.2 °C) 95 98 % 18 188/80 -- -- AM            Physical Exam  Vitals and nursing note reviewed.   Constitutional:       General: She is not in acute distress.     Appearance: Normal appearance.   HENT:      Head: Normocephalic and atraumatic.      Right Ear: External ear normal.      Left Ear: External ear normal.      Nose: Nose normal.   Cardiovascular:      Rate and Rhythm: Normal rate and regular rhythm.   Pulmonary:      Effort: Pulmonary effort is normal.      Breath sounds: Normal  breath sounds.   Abdominal:      General: There is no distension.      Palpations: Abdomen is soft.      Tenderness: There is no abdominal tenderness.   Musculoskeletal:      Cervical back: Tenderness (diffuse right sided muscle tenderness) present.      Right lower leg: No edema.      Left lower leg: No edema.   Skin:     General: Skin is warm and dry.   Neurological:      General: No focal deficit present.      Mental Status: She is alert and oriented to person, place, and time. Mental status is at baseline.   Psychiatric:         Behavior: Behavior normal.         Results Reviewed       Procedure Component Value Units Date/Time    High Sensitivity Troponin I Random [[id number]]  (Abnormal) Collected: 10/09/24 1816    Lab Status: Final result Specimen: Blood from Arm, Right Updated: 10/09/24 1853     HS TnI random 5 ng/L     Comprehensive metabolic panel [[id number]]  (Abnormal) Collected: 10/09/24 1816    Lab Status: Final result Specimen: Blood from Arm, Right Updated: 10/09/24 1847     Sodium 137 mmol/L      Potassium 4.7 mmol/L      Chloride 102 mmol/L      CO2 27 mmol/L      ANION GAP 8 mmol/L      BUN 32 mg/dL      Creatinine 1.31 mg/dL      Glucose 164 mg/dL      Calcium 10.0 mg/dL      AST 19 U/L      ALT 38 U/L      Alkaline Phosphatase 181 U/L      Total Protein 7.6 g/dL      Albumin 3.9 g/dL      Total Bilirubin 0.27 mg/dL      eGFR 38 ml/min/1.73sq m     Narrative:      [employer] guidelines for Chronic Kidney Disease (CKD):     Stage 1 with normal or high GFR (GFR > 90 mL/min/1.73 square meters)    Stage 2 Mild CKD (GFR = 60-89 mL/min/1.73 square meters)    Stage 3A Moderate CKD (GFR = 45-59 mL/min/1.73 square meters)    Stage 3B Moderate CKD (GFR = 30-44 mL/min/1.73 square meters)    Stage 4 Severe CKD (GFR = 15-29 mL/min/1.73 square meters)    Stage 5 End Stage CKD (GFR <15 mL/min/1.73 square meters)  Note: GFR calculation is accurate only with a steady state creatinine    CBC  and differential [[id number]]  (Abnormal) Collected: 10/09/24 1816    Lab Status: Final result Specimen: Blood from Arm, Right Updated: 10/09/24 1830     WBC 5.04 Thousand/uL      RBC 4.38 Million/uL      Hemoglobin 13.5 g/dL      Hematocrit 43.2 %      MCV 99 fL      MCH 30.8 pg      MCHC 31.3 g/dL      RDW 13.3 %      MPV 10.6 fL      Platelets 259 Thousands/uL      nRBC 0 /100 WBCs      Segmented % 58 %      Immature Grans % 0 %      Lymphocytes % 28 %      Monocytes % 10 %      Eosinophils Relative 3 %      Basophils Relative 1 %      Absolute Neutrophils 2.95 Thousands/µL      Absolute Immature Grans 0.01 Thousand/uL      Absolute Lymphocytes 1.43 Thousands/µL      Absolute Monocytes 0.48 Thousand/µL      Eosinophils Absolute 0.13 Thousand/µL      Basophils Absolute 0.04 Thousands/µL             XR chest 1 view portable   Final Interpretation by Alisa Jovel MD (10/09 2206)      No acute pulmonary pathology.                  Workstation performed: [id number]             Procedures    ED Medication and Procedure Management   Prior to Admission Medications   Prescriptions Last Dose Informant Patient Reported? Taking?   amLODIPine (NORVASC) 5 mg tablet  Self No No   Sig: Take 1 tablet (5 mg total) by mouth daily   aspirin (ECOTRIN LOW STRENGTH) 81 mg EC tablet   No No   Sig: Take 1 tablet (81 mg total) by mouth daily for 90 days   atorvastatin (LIPITOR) 10 mg tablet   No No   Sig: Take 1 tablet by mouth once daily   cyclobenzaprine (FLEXERIL) 10 mg tablet   No No   Sig: Take 0.5 tablets (5 mg total) by mouth 2 (two) times a day as needed for muscle spasms   levocetirizine (XYZAL) 5 MG tablet   No No   Sig: TAKE 1 TABLET BY MOUTH ONCE DAILY IN THE EVENING   lisinopril (ZESTRIL) 10 mg tablet   No No   Sig: Take 1 tablet (10 mg total) by mouth daily   metFORMIN (GLUCOPHAGE) 500 mg tablet   No No   Sig: Take 1 tablet (500 mg total) by mouth 2 (two) times a day with meals   olopatadine (PATANOL) 0.1 % ophthalmic  solution   No No   Sig: INSTILL 1 DROP INTO EACH EYE TWICE DAILY FOR 5 DAYS   oxybutynin (DITROPAN) 5 mg tablet   No No   Sig: Take 1 tablet (5 mg total) by mouth 2 (two) times a day      Facility-Administered Medications: None     Discharge Medication List as of 10/9/2024  7:35 PM        START taking these medications    Details   lidocaine (Lidoderm) 5 % Apply 1 patch topically over 12 hours daily as needed (pain) Remove & Discard patch within 12 hours or as directed by MD, Starting Wed 10/9/2024, Normal      tiZANidine (ZANAFLEX) 4 mg tablet Take 1 tablet (4 mg total) by mouth every 8 (eight) hours as needed for muscle spasms, Starting Wed 10/9/2024, Normal           CONTINUE these medications which have NOT CHANGED    Details   amLODIPine (NORVASC) 5 mg tablet Take 1 tablet (5 mg total) by mouth daily, Starting Thu 10/3/2019, Until Tue 3/31/2020, Normal      aspirin (ECOTRIN LOW STRENGTH) 81 mg EC tablet Take 1 tablet (81 mg total) by mouth daily for 90 days, Starting Thu 6/6/2019, Until Fri 9/27/2019, No Print      atorvastatin (LIPITOR) 10 mg tablet Take 1 tablet by mouth once daily, Normal      cyclobenzaprine (FLEXERIL) 10 mg tablet Take 0.5 tablets (5 mg total) by mouth 2 (two) times a day as needed for muscle spasms, Starting Sat 12/9/2023, Normal      levocetirizine (XYZAL) 5 MG tablet TAKE 1 TABLET BY MOUTH ONCE DAILY IN THE EVENING, Normal      lisinopril (ZESTRIL) 10 mg tablet Take 1 tablet (10 mg total) by mouth daily, Starting Tue 2/25/2020, Until Mon 5/25/2020, Normal      metFORMIN (GLUCOPHAGE) 500 mg tablet Take 1 tablet (500 mg total) by mouth 2 (two) times a day with meals, Starting Tue 2/25/2020, Until Mon 5/25/2020, Normal      olopatadine (PATANOL) 0.1 % ophthalmic solution INSTILL 1 DROP INTO EACH EYE TWICE DAILY FOR 5 DAYS, Normal      oxybutynin (DITROPAN) 5 mg tablet Take 1 tablet (5 mg total) by mouth 2 (two) times a day, Starting Tue 3/2/2021, Normal           No discharge procedures on  file.  ED SEPSIS DOCUMENTATION   Time reflects when diagnosis was documented in both MDM as applicable and the Disposition within this note       Time User Action Codes Description Comment    10/9/2024  7:34 PM Kiana Cardenas [M62.838] Neck muscle spasm     10/9/2024  7:34 PM Kiana Cardenas [M54.12] Cervical radiculopathy                  Kiana Cardenas MD  10/09/24 7285

## 2024-10-10 LAB
ATRIAL RATE: 69 BPM
P AXIS: 41 DEGREES
PR INTERVAL: 146 MS
QRS AXIS: 76 DEGREES
QRSD INTERVAL: 132 MS
QT INTERVAL: 424 MS
QTC INTERVAL: 454 MS
T WAVE AXIS: 30 DEGREES
VENTRICULAR RATE: 69 BPM

## 2024-10-10 PROCEDURE — 93010 ELECTROCARDIOGRAM REPORT: CPT | Performed by: INTERNAL MEDICINE
